# Patient Record
Sex: FEMALE | Race: WHITE | NOT HISPANIC OR LATINO | Employment: OTHER | URBAN - NONMETROPOLITAN AREA
[De-identification: names, ages, dates, MRNs, and addresses within clinical notes are randomized per-mention and may not be internally consistent; named-entity substitution may affect disease eponyms.]

---

## 2017-01-10 ENCOUNTER — OFFICE VISIT (OUTPATIENT)
Dept: FAMILY MEDICINE CLINIC | Facility: CLINIC | Age: 67
End: 2017-01-10

## 2017-01-10 VITALS
HEIGHT: 63 IN | BODY MASS INDEX: 20.86 KG/M2 | HEART RATE: 93 BPM | DIASTOLIC BLOOD PRESSURE: 75 MMHG | OXYGEN SATURATION: 100 % | WEIGHT: 117.7 LBS | SYSTOLIC BLOOD PRESSURE: 109 MMHG

## 2017-01-10 DIAGNOSIS — E78.2 MIXED HYPERLIPIDEMIA: ICD-10-CM

## 2017-01-10 DIAGNOSIS — N39.0 UTI (URINARY TRACT INFECTION), UNCOMPLICATED: Primary | ICD-10-CM

## 2017-01-10 DIAGNOSIS — E55.9 VITAMIN D DEFICIENCY: ICD-10-CM

## 2017-01-10 DIAGNOSIS — R41.3 MEMORY LOSS: ICD-10-CM

## 2017-01-10 DIAGNOSIS — N39.0 URINARY TRACT INFECTION, SITE UNSPECIFIED: Primary | ICD-10-CM

## 2017-01-10 PROCEDURE — 99214 OFFICE O/P EST MOD 30 MIN: CPT | Performed by: GENERAL PRACTICE

## 2017-01-10 RX ORDER — MELOXICAM 15 MG/1
15 TABLET ORAL DAILY
COMMUNITY
End: 2017-02-20

## 2017-01-10 RX ORDER — SULFAMETHOXAZOLE AND TRIMETHOPRIM 800; 160 MG/1; MG/1
1 TABLET ORAL 2 TIMES DAILY
Qty: 14 TABLET | Refills: 0 | Status: SHIPPED | OUTPATIENT
Start: 2017-01-10 | End: 2017-02-20

## 2017-01-10 NOTE — MR AVS SNAPSHOT
"                        Kalli Lowe   1/10/2017 11:15 AM   Office Visit    Dept Phone:  [phone]   Encounter #:  [id number]    Provider:  Tiffanie Hidalgo MD   Department:  [facility]                Your Full Care Plan              Your Updated Medication List          This list is accurate as of: 1/10/17 12:28 PM.  Always use your most recent med list.                alendronate 35 MG tablet   Commonly known as:  FOSAMAX       Calcium Carbonate 1500 (600 CA) MG tablet       meloxicam 15 MG tablet   Commonly known as:  MOBIC       VITAMIN D2 PO               You Were Diagnosed With        Codes Comments    Memory loss    -  Primary ICD-10-CM: R41.3  ICD-9-CM: 780.93       Instructions    Recheck urine culture on Jan 19th or 20th      Patient Instructions History      Upcoming Appointments     Visit Type Date Time Department    OFFICE VISIT 1/10/2017 11:15 AM [facility]    PHYSICAL 5/11/2017 10:00 AM [facility]      MyChart Signup     Our records indicate that you have declined [facility] [employer] signup. If you would like to sign up for [employer], please email [email or web address] or call [phone] to obtain an activation code.             Other Info from Your Visit           Your Appointments     May 11, 2017 10:00 AM CDT   Physical with Tiffanie Hidalgo MD   [facility] (--)    [address]  [facility] [place] [address] [place] [id number] [phone]           Arrive 15 minutes prior to appointment.              Allergies     No Known Allergies      Reason for Visit     Follow-up     Urinary Tract Infection     Alzheimer's Disease wants to be tested      Vital Signs     Blood Pressure Pulse Height Weight Oxygen Saturation Body Mass Index    109/75 (BP Location: Left arm, Patient Position: Sitting, Cuff Size: Adult) 93 63\" (160 cm) 117 lb 11.2 oz (53.4 kg) 100% 20.85 kg/m2    Smoking Status      "             Never Smoker           Problems and Diagnoses Noted     Memory loss    -  Primary

## 2017-01-10 NOTE — PROGRESS NOTES
Subjective   Kalli Lowe is a 66 y.o. female.     Chief Complaint   Patient presents with   • Follow-up   • Urinary Tract Infection   • Alzheimer's Disease     wants to be tested     Urinary Tract Infection    This is a new problem. The current episode started today. The problem occurs every urination. The problem has been gradually worsening. The quality of the pain is described as burning. The pain is at a severity of 4/10. The pain is moderate. There has been no fever. Associated symptoms include frequency, hesitancy and urgency. Pertinent negatives include no chills, discharge, flank pain, hematuria, nausea, possible pregnancy, sweats or vomiting. She has tried nothing for the symptoms.   Also family thinks she might be having some memory problems, she is not sure, has always had to write things down to remember them. Has been under a lot of stress as has had a lot of life changes. No family history, mother is in her 90's and very sharp. Father  at 96.   Is scheduled for a hip replacement on .    The following portions of the patient's history were reviewed and updated as appropriate: allergies, current medications, past social history and problem list.    Current Outpatient Prescriptions:   •  meloxicam (MOBIC) 15 MG tablet, Take 15 mg by mouth Daily., Disp: , Rfl:   •  alendronate (FOSAMAX) 35 MG tablet, Take 35 mg by mouth. 1 tab once a week first thing in am with full glass of water, do not eat or lay down for 30 mins, Disp: , Rfl:   •  Calcium Carbonate 1500 (600 CA) MG tablet, Take 600 mg by mouth 2 (Two) Times a Day., Disp: , Rfl:   •  Ergocalciferol (VITAMIN D2 PO), Take 1,000 Units by mouth Daily., Disp: , Rfl:   •  sulfamethoxazole-trimethoprim (BACTRIM DS,SEPTRA DS) 800-160 MG per tablet, Take 1 tablet by mouth 2 (Two) Times a Day., Disp: 14 tablet, Rfl: 0    Review of Systems   Constitutional: Negative.  Negative for activity change, appetite change, chills, fatigue, fever and  "unexpected weight change.   HENT: Negative.  Negative for congestion, ear pain, hearing loss, nosebleeds, rhinorrhea, sinus pressure, sneezing, sore throat, tinnitus and trouble swallowing.    Eyes: Negative.  Negative for pain, discharge, redness, itching and visual disturbance.   Respiratory: Negative.  Negative for apnea, cough, chest tightness, shortness of breath and wheezing.    Cardiovascular: Negative.  Negative for chest pain, palpitations and leg swelling.   Gastrointestinal: Negative.  Negative for abdominal distention, abdominal pain, constipation, diarrhea, nausea and vomiting.   Endocrine: Negative.    Genitourinary: Positive for frequency, hesitancy and urgency. Negative for dysuria, flank pain and hematuria.   Musculoskeletal: Negative.  Negative for arthralgias, back pain, gait problem, joint swelling, myalgias, neck pain and neck stiffness.   Skin: Negative.  Negative for color change and rash.   Allergic/Immunologic: Negative.    Neurological: Negative.  Negative for dizziness, weakness, light-headedness, numbness and headaches.   Hematological: Negative.  Negative for adenopathy.   Psychiatric/Behavioral: Negative.  Negative for dysphoric mood and sleep disturbance. The patient is not nervous/anxious.      Objective     Visit Vitals   • /75 (BP Location: Left arm, Patient Position: Sitting, Cuff Size: Adult)   • Pulse 93   • Ht 63\" (160 cm)   • Wt 117 lb 11.2 oz (53.4 kg)   • SpO2 100%   • BMI 20.85 kg/m2     Physical Exam   Constitutional: She is oriented to person, place, and time. She appears well-developed and well-nourished. No distress.   HENT:   Head: Normocephalic and atraumatic.   Nose: Nose normal.   Mouth/Throat: Oropharynx is clear and moist.   Eyes: Conjunctivae and EOM are normal. Pupils are equal, round, and reactive to light. Right eye exhibits no discharge. Left eye exhibits no discharge.   Neck: No thyromegaly present.   Cardiovascular: Normal rate, regular rhythm, normal " heart sounds and intact distal pulses.    Pulmonary/Chest: Effort normal and breath sounds normal.   Abdominal: There is tenderness (mild suprapubic).   Lymphadenopathy:     She has no cervical adenopathy.   Neurological: She is alert and oriented to person, place, and time.   MMSE 30/30   Skin: Skin is warm and dry.   Psychiatric: She has a normal mood and affect.   Nursing note and vitals reviewed.    Assessment/Plan     Problem List Items Addressed This Visit     None      Visit Diagnoses     UTI (urinary tract infection), uncomplicated    -  Primary    Relevant Orders    Urinalysis With / Culture If Indicated    Urine Culture    Urinalysis With / Microscopic If Indicated    Memory loss        Relevant Orders    CBC Auto Differential    TSH+Free T4    Vitamin B12    CMP14+eGFR    Magnesium    CT Head Without Contrast    Mixed hyperlipidemia        Relevant Orders    Lipid Panel    LDL Cholesterol, Direct    Comprehensive Metabolic Panel    Vitamin D deficiency        Relevant Orders    Vitamin D 25 Hydroxy        Will notify regarding results. Repeat urine culture after antibiotic and before surgery.    New Medications Ordered This Visit   Medications   • meloxicam (MOBIC) 15 MG tablet     Sig: Take 15 mg by mouth Daily.   • sulfamethoxazole-trimethoprim (BACTRIM DS,SEPTRA DS) 800-160 MG per tablet     Sig: Take 1 tablet by mouth 2 (Two) Times a Day.     Dispense:  14 tablet     Refill:  0

## 2017-01-11 ENCOUNTER — TELEPHONE (OUTPATIENT)
Dept: FAMILY MEDICINE CLINIC | Facility: CLINIC | Age: 67
End: 2017-01-11

## 2017-01-11 NOTE — TELEPHONE ENCOUNTER
All labs within Normal Limits, Letter Sent.     ----- Message from Tiffanie Hidalgo MD sent at 1/10/2017  4:48 PM CST -----  Card normal

## 2017-01-15 ENCOUNTER — RESULTS ENCOUNTER (OUTPATIENT)
Dept: FAMILY MEDICINE CLINIC | Facility: CLINIC | Age: 67
End: 2017-01-15

## 2017-01-15 DIAGNOSIS — R41.3 MEMORY LOSS: ICD-10-CM

## 2017-01-20 ENCOUNTER — TRANSCRIBE ORDERS (OUTPATIENT)
Dept: PHYSICAL THERAPY | Facility: HOSPITAL | Age: 67
End: 2017-01-20

## 2017-01-20 DIAGNOSIS — M25.551 RIGHT HIP PAIN: Primary | ICD-10-CM

## 2017-01-27 ENCOUNTER — APPOINTMENT (OUTPATIENT)
Dept: PHYSICAL THERAPY | Facility: HOSPITAL | Age: 67
End: 2017-01-27

## 2017-01-30 ENCOUNTER — HOSPITAL ENCOUNTER (OUTPATIENT)
Dept: PHYSICAL THERAPY | Facility: HOSPITAL | Age: 67
Setting detail: THERAPIES SERIES
Discharge: HOME | End: 2017-01-30

## 2017-01-30 DIAGNOSIS — Z96.641 STATUS POST TOTAL REPLACEMENT OF RIGHT HIP: ICD-10-CM

## 2017-01-30 DIAGNOSIS — M25.551 RIGHT HIP PAIN: Primary | ICD-10-CM

## 2017-01-30 PROCEDURE — 97161 PT EVAL LOW COMPLEX 20 MIN: CPT | Performed by: PHYSICAL THERAPIST

## 2017-01-30 PROCEDURE — 97110 THERAPEUTIC EXERCISES: CPT | Performed by: PHYSICAL THERAPIST

## 2017-01-30 PROCEDURE — G8978 MOBILITY CURRENT STATUS: HCPCS | Performed by: PHYSICAL THERAPIST

## 2017-01-30 PROCEDURE — G8979 MOBILITY GOAL STATUS: HCPCS | Performed by: PHYSICAL THERAPIST

## 2017-01-30 NOTE — PROGRESS NOTES
Outpatient Physical Therapy Ortho Initial Evaluation  [facility]     Patient Name: Kalli Lowe  : 1950  MRN: [id number]  Today's Date: 2017      Visit Date: 2017   Attendance:   Subjective Improvement: 0%  Recert Date: 2017  MD Appointment: [facility]    There is no problem list on file for this patient.       Past Medical History   Diagnosis Date   • Benign paroxysmal positional vertigo    • Breast lump      left breast benign on u/s guided mammotone bx      • Contact dermatitis      olecranon area from chair material?     • Cough    • Encounter for long-term (current) use of medications    • GERD (gastroesophageal reflux disease)    • H/O screening mammography    • Hip pain    • History of Holter monitoring 2013     Overall rhythm sinus rhythm with average heart rate of 73 bpm, min 49 , max 111. Frequent isolated with frequent bigeminal cycles, total of 491 bigeminal cycles. No couplets of runs of ventricular tachycardia. Rare isolated PACs, total of 25.   • History of urinary disorder    • Large ovary    • Myalgia    • Osteoarthritis    • Osteopenia    • Otalgia of left ear      resolved spontaneously      • Palpitations    • Postmenopausal state    • Screening for malignant neoplasm of breast       left breast benign mammotone bx      • Screening for osteoporosis    • Vertigo      resolved        Past Surgical History   Procedure Laterality Date   • Breast biopsy  2014     Ultrasound guided left breast mammotome biopsy with 8 gauge needle and also left a clip.   • Colonoscopy  2013     Hemorrhoids found.   • Pap smear  2015     WNL, CARD SENT, DR. CARLSON'S OFFICE   • Injection of medication  2015     PNEUMOC VAC/ADMIN/RCVD 4040F (1)      • Hip surgery Right 2017     Total hip-lateral approach       Visit Dx:     ICD-10-CM ICD-9-CM   1. Right hip pain M25.551 719.45   2. Status post total replacement of right hip Z96.641 V43.64              Patient History       01/30/17 1100          History    Chief Complaint Muscle weakness;Difficulty Walking;Difficulty with daily activities;Balance Problems;Pain   Post operatively   -BB      Type of Pain Hip pain  -BB      Date Current Problem(s) Began 01/24/17   Day of surgery  -BB      Brief Description of Current Complaint Had hx of hip pain chronically with attempts of PT to assist. No sigificant improvement then had surgery.   -BB      Previous treatment for THIS PROBLEM Rehabilitation;Other (comment)   history of failed conservation PT tx prior to surgery 1 year  -BB      Onset Date- PT 1/30/2017   Surgery 1/24/2017  -BB      Patient/Caregiver Goals Return to prior level of function  -BB      What clinical tests have you had for this problem? --   No imaging since surgery  -BB      Related/Recent Hospitalizations Yes  -BB      Date of Hospitalization 01/24/17   DC 1/27/2017  -BB      Pain     Pain Location Hip  -BB      Pain at Present 3  -BB      Pain Frequency Intermittent  -BB      Pain Comments Normal post surgical pain  -BB      Is your sleep disturbed? Yes  -BB      Difficulties with ADL's? Having assistance from   -BB      Fall Risk Assessment    Any falls in the past year: Other (comment)   Patient is fall risk due to post surgical limitations  -BB        User Key  (r) = Recorded By, (t) = Taken By, (c) = Cosigned By    Initials Name Provider Type    ANA Alcantara PT Physical Therapist                PT Ortho       01/30/17 1700    Precautions and Contraindications    Precautions/Limitations hip precautions- right   lateral approach  -BB    Precautions Total Hip Protocol under media tab from MD office.   -BB    Pain Assessment    Pain Assessment 0-10  -BB    Pain Location Hip  -BB    Pain Frequency Constant/continuous  -BB    Posture/Observations    Observations Edema   RLE in knee, ankle, foot  -BB    Posture/Observations Comments Antalgic step to pattern with rollator seen. Assistance  needed with supine to sit and sit to supine transfer  -BB    Sensation    Sensation WNL? WNL  -BB    Sensory Screen for Light Touch- Lower Quarter Clearing    L2 (anterior mid thigh) Intact  -BB    L3 (distal anterior thigh) Intact  -BB    L4 (medial lower leg/foot) Intact  -BB    L5 (lateral lower leg/great toe) Intact  -BB    Myotomal Screen- Lower Quarter Clearing    Hip flexion (L2) 2 (Poor)   due to post surgical weakness  -BB    Special Tests/Palpation    Special Tests/Palpation --   No sigificant muscular tenderness noted. Tender at incision  -BB    ROM (Range of Motion)    General ROM lower extremity range of motion deficits identified  -BB    General LE Assessment    ROM hip, right: LE ROM deficit;knee, right: LE ROM deficit;ankle, right: LE ROM deficit  -BB    Right Hip    Flexion AROM other (see comments)   0-90 seen in sitting  -BB    ABduction AROM other (see comments)   to neutral   -BB    ADduction AROM other (see comments)   to neutral  -BB    Internal Rotation AROM other (see comments)   Deferred  -BB    External Rotation AROM other (see comments)   Deferred  -BB    Right Knee    Extension/Flexion AROM WNL (0-130 degrees)  -BB    MMT (Manual Muscle Testing)    General MMT Assessment lower extremity strength deficits identified  -BB    Right Hip    Hip Flexion Gross Movement (2/5) poor  -BB    Hip ABduction Gross Movement other (see comments)   Deferred  -BB    Hip ADduction Gross Movement other (see comments)   Deferred  -BB    Hip Int (Medial) Rotation Gross Movement other (see comments)   Deferred  -BB    Hip Ext (Lat) Rotation Gross Movement other (see comments)   Deferred  -BB    Lower Extremity    Lower Ext Manual Muscle Testing right hip strength deficit  -BB    Girth    Girth Measured? Right Lower Extremity  -BB    RLE Quick Girth (cm)    Largest calf 12.5 cm   inches  -BB    Distal thigh 14 cm   inches  -BB    Other 1 20 cm   20 inches figure 8  -BB    Transfers    Transfers, Sit-Stand  [place] supervision required  -BB    Transfer, Impairments other (see comments)   Cues to maintain hip precautions. Required assistance  -BB    Gait Assessment/Treatment    Gait, [place] Level supervision required  -BB    Gait, Assistive Device rollator  -BB    Gait, Comment Step to pattern with slow gait.   -BB      User Key  (r) = Recorded By, (t) = Taken By, (c) = Cosigned By    Initials Name Provider Type    BB Alma Alcantara PT Physical Therapist                            Therapy Education       01/30/17 1700    Therapy Education    Given HEP  -BB    Program New  -BB    How Provided Verbal  -BB    Provided to Patient  -BB    Level of Understanding Verbalized;Demonstrated  -BB      User Key  (r) = Recorded By, (t) = Taken By, (c) = Cosigned By    Initials Name Provider Type    ANA Alcantara PT Physical Therapist                PT OP Goals       01/30/17 1745 01/30/17 1700       PT Short Term Goals    STG Date to Achieve  02/20/17  -BB     STG 1  Independent in HEP   -BB     STG 1 Progress  New  -BB     STG 2  SLR x10 no lag  -BB     STG 2 Progress  New  -BB     STG 3  Sit to stand no UE assist  -BB     STG 3 Progress  New  -BB     Long Term Goals    LTG Date to Achieve  03/13/17  -BB     LTG 1  Independent in final HEP   -BB     LTG 1 Progress  New  -BB     LTG 2  SLS RLE 5 seconds level ground with eyes open  -BB     LTG 2 Progress  New  -BB     LTG 3  Hip flexion 5/5  -BB     LTG 3 Progress  New  -BB     LTG 4  Hip abduction 5/5  -BB     LTG 4 Progress  New  -BB     LTG 5  No increase in pain with 6 minute walk test  -BB     LTG 5 Progress  New  -BB     Time Calculation    PT Goal Re-Cert Due Date 02/20/17  -BB 02/20/17  -BB       User Key  (r) = Recorded By, (t) = Taken By, (c) = Cosigned By    Initials Name Provider Type    ANA Alcantara PT Physical Therapist                PT Assessment/Plan       01/30/17 1700          PT Assessment    Functional Limitations Decreased safety during  functional activities;Impaired gait;Impaired locomotion;Performance in leisure activities;Performance in self-care ADL  -BB      Impairments Balance;Endurance;Gait;Impaired flexibility;Impaired muscle endurance;Joint mobility;Locomotion;Motor function;Muscle strength;Pain;Range of motion;Other (comment)   Swelling  -BB      Assessment Comments Patient presents postoperatively with decreased gait, balance, pain, muscular endurance and would benefit from skilled PT services addressing deficits. Patient educated on transfer training while maintaining hip precautions for lateral approach    Tubigrip dispensed for edema in RLE.   -BB      Rehab Potential Good  -BB      Patient/caregiver participated in establishment of treatment plan and goals Yes  -BB      Patient would benefit from skilled therapy intervention Yes  -BB      PT Plan    PT Frequency 3x/week  -BB      Predicted Duration of Therapy Intervention (days/wks) 6-8 weeks    per MD order  -BB      Planned CPT's? PT EVAL: 02071;PT RE-EVAL: 12017;PT THER PROC EA 15 MIN: 08571;PT MANUAL THERAPY EA 15 MIN: 01646;PT NEUROMUSC RE-EDUCATION EA 15 MIN: 95493;PT GAIT TRAINING EA 15 MIN: 88998;PT ELECTRICAL STIM UNATTEND: ;PT ELECTRICAL STIM ATTD EA 15 MIN: 65010;PT ULTRASOUND EA 15 MIN: 42311;PT THER SUPP EA 15 MIN  -BB      Physical Therapy Interventions (Optional Details) balance training;bed mobility training;gait training;home exercise program;joint mobilization;manual therapy techniques;modalities;neuromuscular re-education;ROM (Range of Motion);stair training;strengthening;stretching;transfer training  -BB      PT Plan Comments Progress to SC as able safely once quad control seen.   -BB        User Key  (r) = Recorded By, (t) = Taken By, (c) = Cosigned By    Initials Name Provider Type    ANA Alcantara, PT Physical Therapist                Modalities       01/30/17 1700          Subjective Comments    Subjective Comments Reports going up/down stairs in home  "1x with assistance and difficulty. Surgery performed at [facility]. Has a lateral approach with no sutures-steristrips only. Denies any drainage/opening/redness. Lateral approach. Noticed increased swelling in knee and foot this weekend and has kept leg and foot elevated. Pt complains of wanting \"normal sex\" and is having constant pain at this point.   -BB      Subjective Pain    Able to rate subjective pain? yes  -BB      Pre-Treatment Pain Level 3  -BB      Post-Treatment Pain Level --   \"feels stretched\"  -BB      Ice    Patient denies application of Ice Yes  -BB        User Key  (r) = Recorded By, (t) = Taken By, (c) = Cosigned By    Initials Name Provider Type    ANA Alcantara, PT Physical Therapist              Exercises       01/30/17 1700          Subjective Comments    Subjective Comments Reports going up/down stairs in home 1x with assistance and difficulty. Surgery performed at [facility]. Has a lateral approach with no sutures-steristrips only. Denies any drainage/opening/redness. Lateral approach. Noticed increased swelling in knee and foot this weekend and has kept leg and foot elevated. Pt complains of wanting \"normal sex\" and is having constant pain at this point.   -BB      Subjective Pain    Able to rate subjective pain? yes  -BB      Pre-Treatment Pain Level 3  -BB      Post-Treatment Pain Level --   \"feels stretched\"  -BB      Exercise 1    Exercise Name 1 AAROM SLR   -BB      Cueing 1 Tactile  -BB      Sets 1 1  -BB      Reps 1 10  -BB      Exercise 2    Exercise Name 2 Quad sets  -BB      Cueing 2 Verbal  -BB      Sets 2 1  -BB      Reps 2 10  -BB      Time (Seconds) 2 3 second hold  -BB      Exercise 3    Exercise Name 3 Heel slide  -BB      Cueing 3 Verbal  -BB      Sets 3 1  -BB      Reps 3 10  -BB      Exercise 4    Exercise Name 4 SAQ with large bolster  -BB      Cueing 4 Verbal  -BB      Sets 4 2  -BB      Reps 4 10  -BB      Exercise 5    Exercise Name 5 LAQ   " -BB      Sets 5 2  -BB      Reps 5 10  -BB        User Key  (r) = Recorded By, (t) = Taken By, (c) = Cosigned By    Initials Name Provider Type    BB Alma Alcantara PT Physical Therapist                              Outcome Measures       01/30/17 1700          Lower Extremity Functional Index    Any of your usual work, housework or school activities 0  -BB      Your usual hobbies, recreational or sporting activities 0  -BB      Getting into or out of the bath 0  -BB      Walking between rooms 0  -BB      Putting on your shoes or socks 0  -BB      Squatting 0  -BB      Lifting an object, like a bag of groceries from the floor 0  -BB      Performing light activities around your home 0  -BB      Performing heavy activities around your home 0  -BB      Getting into or out of a car 0  -BB      Walking 2 blocks 0  -BB      Walking a mile 0  -BB      Going up or down 10 stairs (about 1 flight of stairs) 0  -BB      Standing for 1 hour 0  -BB      Sitting for 1 hour 1  -BB      Running on even ground 0  -BB      Running on uneven ground 0  -BB      Making sharp turns while running fast 0  -BB      Hopping 0  -BB      Rolling over in bed 1  -BB      Total 2  -BB      Functional Assessment    Outcome Measure Options Lower Extremity Functional Scale (LEFS)  -BB        User Key  (r) = Recorded By, (t) = Taken By, (c) = Cosigned By    Initials Name Provider Type    BB Alma Alcantara PT Physical Therapist            Time Calculation:   Start Time: 1040  Stop Time: 1136  Time Calculation (min): 56 min  Total Timed Code Minutes- PT: 36 minute(s)     Therapy Charges for Today     Code Description Service Date Service Provider Modifiers Qty    21129465150 HC PT MOBILITY CURRENT 1/30/2017 Alma Alcantara PT GP, CL 1    89008909847 HC PT MOBILITY PROJECTED 1/30/2017 Alma Alcantara PT GP, CI 1    78577569916 HC PT EVAL LOW COMPLEXITY 1 1/30/2017 Alma Alcantara PT GP 1    10782348029 HC PT THER PROC EA 15 MIN 1/30/2017 Alma Alcantara PT  GP 2          PT G-Codes  PT Professional Judgement Used?: Yes  Outcome Measure Options: Lower Extremity Functional Scale (LEFS)  Score: 2/80  Functional Limitation: Mobility: Walking and moving around  Mobility: Walking and Moving Around Current Status (): At least 60 percent but less than 80 percent impaired, limited or restricted  Mobility: Walking and Moving Around Goal Status (): At least 1 percent but less than 20 percent impaired, limited or restricted         Alma Alcantara, PT  1/30/2017

## 2017-01-30 NOTE — MR AVS SNAPSHOT
[facility]   [phone]                    Kalli Lowe   1/30/2017 11:00 AM   Initial Evaluation    Dept Phone:  [phone]   Encounter #:  [id number]    Provider:  Alma Alcantara PT   Department:  [facility]                 Your Full Care Plan              Your Updated Medication List      ASK your doctor about these medications     alendronate 35 MG tablet   Commonly known as:  FOSAMAX       Calcium Carbonate 1500 (600 CA) MG tablet       meloxicam 15 MG tablet   Commonly known as:  MOBIC       sulfamethoxazole-trimethoprim 800-160 MG per tablet   Commonly known as:  BACTRIM DS,SEPTRA DS   Take 1 tablet by mouth 2 (Two) Times a Day.       VITAMIN D2 PO               You Were Diagnosed With        Codes Comments    Right hip pain    -  Primary ICD-10-CM: M25.551  ICD-9-CM: 719.45     Status post total replacement of right hip     ICD-10-CM: Z96.641  ICD-9-CM: V43.64       Instructions     None    Patient Instructions History      Upcoming Appointments     Visit Type Date Time Department    INITIAL EVALUATION 1/30/2017 11:00 AM  [facility]     TREATMENT 2/6/2017 11:00 AM  [facility]     TREATMENT 2/8/2017 11:15 AM  [facility]     TREATMENT 2/10/2017 10:00 AM  [facility]     CT MAD HEAD WO CONTRAST 2/13/2017  3:00 PM  MAD CT    PHYSICAL 5/11/2017 10:00 AM [facility]    MAMMO MAD SCREEN BILAT 5/11/2017 11:00 AM [facility]      MyChart Signup     Our records indicate that you have declined [facility] MyChart signup. If you would like to sign up for MyChart, please email [email or web address] or call [phone] to obtain an activation code.             Other Info from Your Visit           Your Appointments     Feb 06, 2017 11:00 AM CST   Therapy Treatment with Sheree Ball PTA   [facility]  (--)    [address] [place] [id number] [phone]            Feb 08,  2017 11:15 AM CST   Therapy Treatment with Sheree Ball PTA   [facility]  (--)    [address] [address] [phone]            Feb 10, 2017 10:00 AM CST   Therapy Treatment with Sheree Ball PTA   [facility]  (--)    [address] [address] [phone]            Feb 13, 2017  3:00 PM CST   CT mad head wo contrast with MAD CT 1   [facility] CT ([place])    [address] [address] [phone]           Patient may only have liquids 4 hrs prior to exam. Please arrive 15 minutes prior to exam.            May 11, 2017 10:00 AM CDT   Physical with Tiffanie Hidalgo MD   [facility] (--)    [address]  [address] [place] [address] [phone]           Arrive 15 minutes prior to appointment.              Allergies     No Known Allergies      Reason for Visit     Right Hip - Pain, Weakness - Generalized, Gait Problem     PT Initial Evaluation           Vital Signs     Smoking Status                   Never Smoker           Problems and Diagnoses Noted     Right hip pain    -  Primary    Status post total replacement of right hip

## 2017-02-01 ENCOUNTER — HOSPITAL ENCOUNTER (OUTPATIENT)
Dept: PHYSICAL THERAPY | Facility: HOSPITAL | Age: 67
Setting detail: THERAPIES SERIES
Discharge: HOME | End: 2017-02-01

## 2017-02-01 DIAGNOSIS — Z96.641 STATUS POST TOTAL REPLACEMENT OF RIGHT HIP: Primary | ICD-10-CM

## 2017-02-01 PROCEDURE — 97116 GAIT TRAINING THERAPY: CPT

## 2017-02-01 PROCEDURE — 97110 THERAPEUTIC EXERCISES: CPT

## 2017-02-01 NOTE — PROGRESS NOTES
Outpatient Physical Therapy Ortho Treatment Note  [facility]     Patient Name: Kalli Lowe  : 1950  MRN: [id number]  Today's Date: 2017      Visit Date: 2017  Pt. Attended 2/2 sessions reports no subjective improvement returns to MD 17 recert date 17  Visit Dx:    ICD-10-CM ICD-9-CM   1. Status post total replacement of right hip Z96.641 V43.64       There is no problem list on file for this patient.       Past Medical History   Diagnosis Date   • Benign paroxysmal positional vertigo    • Breast lump      left breast benign on u/s guided mammotone bx      • Contact dermatitis      olecranon area from chair material?     • Cough    • Encounter for long-term (current) use of medications    • GERD (gastroesophageal reflux disease)    • H/O screening mammography    • Hip pain    • History of Holter monitoring 2013     Overall rhythm sinus rhythm with average heart rate of 73 bpm, min 49 , max 111. Frequent isolated with frequent bigeminal cycles, total of 491 bigeminal cycles. No couplets of runs of ventricular tachycardia. Rare isolated PACs, total of 25.   • History of urinary disorder    • Large ovary    • Myalgia    • Osteoarthritis    • Osteopenia    • Otalgia of left ear      resolved spontaneously      • Palpitations    • Postmenopausal state    • Screening for malignant neoplasm of breast       left breast benign mammotone bx      • Screening for osteoporosis    • Vertigo      resolved        Past Surgical History   Procedure Laterality Date   • Breast biopsy  2014     Ultrasound guided left breast mammotome biopsy with 8 gauge needle and also left a clip.   • Colonoscopy  2013     Hemorrhoids found.   • Pap smear  2015     WNL, CARD SENT, DR. CARLSON'S OFFICE   • Injection of medication  2015     PNEUMOC VAC/ADMIN/RCVD 4040F (1)      • Hip surgery Right 2017     Total hip-lateral approach             PT Ortho       17  1700    Precautions and Contraindications    Precautions/Limitations hip precautions- right   lateral approach  -BB    Precautions Total Hip Protocol under media tab from MD office.   -BB    Pain Assessment    Pain Assessment 0-10  -BB    Pain Location Hip  -BB    Pain Frequency Constant/continuous  -BB    Posture/Observations    Observations Edema   RLE in knee, ankle, foot  -BB    Posture/Observations Comments Antalgic step to pattern with rollator seen. Assistance needed with supine to sit and sit to supine transfer  -BB    Sensation    Sensation WNL? WNL  -BB    Sensory Screen for Light Touch- Lower Quarter Clearing    L2 (anterior mid thigh) Intact  -BB    L3 (distal anterior thigh) Intact  -BB    L4 (medial lower leg/foot) Intact  -BB    L5 (lateral lower leg/great toe) Intact  -BB    Myotomal Screen- Lower Quarter Clearing    Hip flexion (L2) 2 (Poor)   due to post surgical weakness  -BB    Special Tests/Palpation    Special Tests/Palpation --   No sigificant muscular tenderness noted. Tender at incision  -BB    ROM (Range of Motion)    General ROM lower extremity range of motion deficits identified  -BB    General LE Assessment    ROM hip, right: LE ROM deficit;knee, right: LE ROM deficit;ankle, right: LE ROM deficit  -BB    Right Hip    Flexion AROM other (see comments)   0-90 seen in sitting  -BB    ABduction AROM other (see comments)   to neutral   -BB    ADduction AROM other (see comments)   to neutral  -BB    Internal Rotation AROM other (see comments)   Deferred  -BB    External Rotation AROM other (see comments)   Deferred  -BB    Right Knee    Extension/Flexion AROM WNL (0-130 degrees)  -BB    MMT (Manual Muscle Testing)    General MMT Assessment lower extremity strength deficits identified  -BB    Right Hip    Hip Flexion Gross Movement (2/5) poor  -BB    Hip ABduction Gross Movement other (see comments)   Deferred  -BB    Hip ADduction Gross Movement other (see comments)   Deferred  -BB    Hip Int  (Medial) Rotation Gross Movement other (see comments)   Deferred  -BB    Hip Ext (Lat) Rotation Gross Movement other (see comments)   Deferred  -BB    Lower Extremity    Lower Ext Manual Muscle Testing right hip strength deficit  -BB    Girth    Girth Measured? Right Lower Extremity  -BB    RLE Quick Girth (cm)    Largest calf 12.5 cm   inches  -BB    Distal thigh 14 cm   inches  -BB    Other 1 20 cm   20 inches figure 8  -BB    Transfers    Transfers, Sit-Stand [place] supervision required  -BB    Transfer, Impairments other (see comments)   Cues to maintain hip precautions. Required assistance  -BB    Gait Assessment/Treatment    Gait, [place] Level supervision required  -BB    Gait, Assistive Device rollator  -BB    Gait, Comment Step to pattern with slow gait.   -BB      User Key  (r) = Recorded By, (t) = Taken By, (c) = Cosigned By    Initials Name Provider Type    ANA Alcantara, PT Physical Therapist                            PT Assessment/Plan       02/01/17 1645 01/30/17 1700       PT Assessment    Functional Limitations  Decreased safety during functional activities;Impaired gait;Impaired locomotion;Performance in leisure activities;Performance in self-care ADL  -BB     Impairments  Balance;Endurance;Gait;Impaired flexibility;Impaired muscle endurance;Joint mobility;Locomotion;Motor function;Muscle strength;Pain;Range of motion;Other (comment)   Swelling  -BB     Assessment Comments Remains very cautious and guarded with ambulation with r. walker.  Good ROM hip flexion to 80 degrees with no pain good understanding of precautions   -SP Patient presents postoperatively with decreased gait, balance, pain, muscular endurance and would benefit from skilled PT services addressing deficits. Patient educated on transfer training while maintaining hip precautions for lateral approach    Tubigrip dispensed for edema in RLE.   -BB     Rehab Potential  Good  -BB     Patient/caregiver participated in  establishment of treatment plan and goals  Yes  -BB     Patient would benefit from skilled therapy intervention  Yes  -BB     PT Plan    PT Frequency 3x/week  -SP 3x/week  -BB     Predicted Duration of Therapy Intervention (days/wks) 6-8 weeks  -SP 6-8 weeks    per MD order  -BB     Planned CPT's?  PT EVAL: 21252;PT RE-EVAL: 09419;PT THER PROC EA 15 MIN: 96992;PT MANUAL THERAPY EA 15 MIN: 10698;PT NEUROMUSC RE-EDUCATION EA 15 MIN: 41414;PT GAIT TRAINING EA 15 MIN: 40687;PT ELECTRICAL STIM UNATTEND: ;PT ELECTRICAL STIM ATTD EA 15 MIN: 61699;PT ULTRASOUND EA 15 MIN: 87116;PT THER SUPP EA 15 MIN  -BB     Physical Therapy Interventions (Optional Details)  balance training;bed mobility training;gait training;home exercise program;joint mobilization;manual therapy techniques;modalities;neuromuscular re-education;ROM (Range of Motion);stair training;strengthening;stretching;transfer training  -BB     PT Plan Comments Continue with week 0-2 hip protocol continue with THR precautions   add ham sets seated over tball or supine <90  step thrus and work on increasing ambulation  -SP Progress to SC as able safely once quad control seen.   -BB       User Key  (r) = Recorded By, (t) = Taken By, (c) = Cosigned By    Initials Name Provider Type    ANA Alcantara, PT Physical Therapist    SP Mary Kate Craig, PTA Physical Therapy Assistant                    Exercises       02/01/17 1500          Subjective Comments    Subjective Comments Still needs walker to ambulate and sleeps okay  -SP      Subjective Pain    Able to rate subjective pain? yes  -SP      Pre-Treatment Pain Level 2  -SP      Post-Treatment Pain Level 2  -SP      Subjective Pain Comment --   pt reports 2.5  -SP      Exercise 1    Exercise Name 1 amb with rollator 360 without sitting  -SP      Cueing 1 --  -SP      Sets 1 --  -SP      Reps 1 --  -SP      Exercise 2    Exercise Name 2 standing hip ext light stretch only toe touch  -SP      Cueing 2 --  -SP       Sets 2 3  -SP      Reps 2 30  -SP      Time (Seconds) 2 --  -SP      Exercise 3    Exercise Name 3 Heel slide  -SP      Cueing 3 Verbal  -SP      Sets 3 1  -SP      Reps 3 10  -SP      Exercise 4    Exercise Name 4 SAQ with large bolster  -SP      Cueing 4 Verbal  -SP      Sets 4 2  -SP      Reps 4 10  -SP      Exercise 5    Exercise Name 5 LAQ with add within hip precautions  -SP      Sets 5 2  -SP      Reps 5 10  -SP      Exercise 6    Exercise Name 6 seated march within hip precautions  -SP      Sets 6 2  -SP      Reps 6 20  -SP      Exercise 7    Exercise Name 7 standing march, hip abd, ham curls  -SP      Reps 7 10  -SP        User Key  (r) = Recorded By, (t) = Taken By, (c) = Cosigned By    Initials Name Provider Type    SP Mary Kate Craig, PTA Physical Therapy Assistant                               PT OP Goals       02/01/17 1649 02/01/17 1600 01/30/17 1745    PT Short Term Goals    STG Date to Achieve  02/20/17  -SP     STG 1  Independent in HEP   -SP     STG 1 Progress  Progressing  -SP     STG 2  SLR x10 no lag  -SP     STG 2 Progress  Not Met  -SP     STG 3  Sit to stand no UE assist  -SP     STG 3 Progress  Not Met  -SP     Long Term Goals    LTG Date to Achieve  03/13/17  -SP     LTG 1  Independent in final HEP   -SP     LTG 1 Progress  Not Met  -SP     LTG 2  SLS RLE 5 seconds level ground with eyes open  -SP     LTG 2 Progress  Not Met  -SP     LTG 3  Hip flexion 5/5  -SP     LTG 3 Progress  Not Met  -SP     LTG 4  Hip abduction 5/5  -SP     LTG 4 Progress  Not Met  -SP     LTG 5  No increase in pain with 6 minute walk test  -SP     LTG 5 Progress  Not Met  -SP     Time Calculation    PT Goal Re-Cert Due Date 02/20/17  -SP 02/20/17  -SP 02/20/17  -BB      01/30/17 1700          PT Short Term Goals    STG Date to Achieve 02/20/17  -BB      STG 1 Independent in HEP   -BB      STG 1 Progress New  -BB      STG 2 SLR x10 no lag  -BB      STG 2 Progress New  -BB      STG 3 Sit to stand no UE assist  -BB       STG 3 Progress New  -BB      Long Term Goals    LTG Date to Achieve 03/13/17  -BB      LTG 1 Independent in final HEP   -BB      LTG 1 Progress New  -BB      LTG 2 SLS RLE 5 seconds level ground with eyes open  -BB      LTG 2 Progress New  -BB      LTG 3 Hip flexion 5/5  -BB      LTG 3 Progress New  -BB      LTG 4 Hip abduction 5/5  -BB      LTG 4 Progress New  -BB      LTG 5 No increase in pain with 6 minute walk test  -BB      LTG 5 Progress New  -BB      Time Calculation    PT Goal Re-Cert Due Date 02/20/17  -BB        User Key  (r) = Recorded By, (t) = Taken By, (c) = Cosigned By    Initials Name Provider Type    ANA Alcantara, PT Physical Therapist    SP Mary Kate Craig, PTA Physical Therapy Assistant                Therapy Education       01/30/17 1700    Therapy Education    Given HEP  -BB    Program New  -BB    How Provided Verbal  -BB    Provided to Patient  -BB    Level of Understanding Verbalized;Demonstrated  -BB      User Key  (r) = Recorded By, (t) = Taken By, (c) = Cosigned By    Initials Name Provider Type    ANA Alcantara, PT Physical Therapist                Outcome Measures       01/30/17 1700          Lower Extremity Functional Index    Any of your usual work, housework or school activities 0  -BB      Your usual hobbies, recreational or sporting activities 0  -BB      Getting into or out of the bath 0  -BB      Walking between rooms 0  -BB      Putting on your shoes or socks 0  -BB      Squatting 0  -BB      Lifting an object, like a bag of groceries from the floor 0  -BB      Performing light activities around your home 0  -BB      Performing heavy activities around your home 0  -BB      Getting into or out of a car 0  -BB      Walking 2 blocks 0  -BB      Walking a mile 0  -BB      Going up or down 10 stairs (about 1 flight of stairs) 0  -BB      Standing for 1 hour 0  -BB      Sitting for 1 hour 1  -BB      Running on even ground 0  -BB      Running on uneven ground 0  -BB       Making sharp turns while running fast 0  -BB      Hopping 0  -BB      Rolling over in bed 1  -BB      Total 2  -BB      Functional Assessment    Outcome Measure Options Lower Extremity Functional Scale (LEFS)  -BB        User Key  (r) = Recorded By, (t) = Taken By, (c) = Cosigned By    Initials Name Provider Type    BB Alma Alcantara, PT Physical Therapist            Time Calculation:   Start Time: 1532  Stop Time: 1626  Time Calculation (min): 54 min  Total Timed Code Minutes- PT: 54 minute(s)    Therapy Charges for Today     Code Description Service Date Service Provider Modifiers Qty    [id number] HC GAIT TRAINING EA 15 MIN 2/1/2017 Mary Kate Craig, PTA GP 1    [id number]  PT THER PROC EA 15 MIN 2/1/2017 Mary Kate Craig PTA GP 3                    Mary Kate Craig PTA  2/1/2017

## 2017-02-03 ENCOUNTER — HOSPITAL ENCOUNTER (OUTPATIENT)
Dept: PHYSICAL THERAPY | Facility: HOSPITAL | Age: 67
Setting detail: THERAPIES SERIES
Discharge: HOME | End: 2017-02-03

## 2017-02-03 DIAGNOSIS — M25.551 RIGHT HIP PAIN: ICD-10-CM

## 2017-02-03 DIAGNOSIS — Z96.641 STATUS POST TOTAL REPLACEMENT OF RIGHT HIP: Primary | ICD-10-CM

## 2017-02-03 PROCEDURE — 97110 THERAPEUTIC EXERCISES: CPT

## 2017-02-03 NOTE — PROGRESS NOTES
Outpatient Physical Therapy Ortho Treatment Note  [facility]     Patient Name: Kalli Lowe  : 1950  MRN: [id number]  Today's Date: 2/3/2017      Visit Date: 2017  Subjective Improvement: 0%  MD visit: 2-10-17  Visit Number: 3/3  Total Approved:medicare  Recert Date: 17  S/P Right Total Hip (ant approach) 17    Visit Dx:    ICD-10-CM ICD-9-CM   1. Status post total replacement of right hip Z96.641 V43.64   2. Right hip pain M25.551 719.45       There is no problem list on file for this patient.       Past Medical History   Diagnosis Date   • Benign paroxysmal positional vertigo    • Breast lump      left breast benign on u/s guided mammotone bx      • Contact dermatitis      olecranon area from chair material?     • Cough    • Encounter for long-term (current) use of medications    • GERD (gastroesophageal reflux disease)    • H/O screening mammography    • Hip pain    • History of Holter monitoring 2013     Overall rhythm sinus rhythm with average heart rate of 73 bpm, min 49 , max 111. Frequent isolated with frequent bigeminal cycles, total of 491 bigeminal cycles. No couplets of runs of ventricular tachycardia. Rare isolated PACs, total of 25.   • History of urinary disorder    • Large ovary    • Myalgia    • Osteoarthritis    • Osteopenia    • Otalgia of left ear      resolved spontaneously      • Palpitations    • Postmenopausal state    • Screening for malignant neoplasm of breast       left breast benign mammotone bx      • Screening for osteoporosis    • Vertigo      resolved        Past Surgical History   Procedure Laterality Date   • Breast biopsy  2014     Ultrasound guided left breast mammotome biopsy with 8 gauge needle and also left a clip.   • Colonoscopy  2013     Hemorrhoids found.   • Pap smear  2015     WNL, CARD SENT, DR. CARLSON'S OFFICE   • Injection of medication  2015     PNEUMOC VAC/ADMIN/RCVD 4040F (1)      • Hip  surgery Right 01/24/2017     Total hip-lateral approach             PT Ortho       02/03/17 1245    Precautions and Contraindications    Precautions/Limitations anterior hip precautions- right;hip precautions- right  -KH    Contraindications Hip Precautions/Protocol in chart  -      User Key  (r) = Recorded By, (t) = Taken By, (c) = Cosigned By    Initials Name Provider Type    CECIL Ball PTA Physical Therapy Assistant                            PT Assessment/Plan       02/03/17 1245 02/01/17 1645 01/30/17 1700    PT Assessment    Functional Limitations   Decreased safety during functional activities;Impaired gait;Impaired locomotion;Performance in leisure activities;Performance in self-care ADL  -BB    Impairments   Balance;Endurance;Gait;Impaired flexibility;Impaired muscle endurance;Joint mobility;Locomotion;Motor function;Muscle strength;Pain;Range of motion;Other (comment)   Swelling  -BB    Assessment Comments Attempted Pro 2 but unable secondary to pain in hip and just uncomfortable,  Did well with treatment otherwise with improved gait step though.  VC still needed to increase wt through R HIP  - Remains very cautious and guarded with ambulation with r. walker.  Good ROM hip flexion to 80 degrees with no pain good understanding of precautions   -SP Patient presents postoperatively with decreased gait, balance, pain, muscular endurance and would benefit from skilled PT services addressing deficits. Patient educated on transfer training while maintaining hip precautions for lateral approach    Tubigrip dispensed for edema in RLE.   -BB    Rehab Potential   Good  -BB    Patient/caregiver participated in establishment of treatment plan and goals   Yes  -BB    Patient would benefit from skilled therapy intervention   Yes  -BB    PT Plan    PT Frequency  3x/week  -SP 3x/week  -BB    Predicted Duration of Therapy Intervention (days/wks)  6-8 weeks  -SP 6-8 weeks    per MD order  -BB    Planned CPT's?   PT  EVAL: 36855;PT RE-EVAL: 65893;PT THER PROC EA 15 MIN: 90489;PT MANUAL THERAPY EA 15 MIN: 86288;PT NEUROMUSC RE-EDUCATION EA 15 MIN: 40832;PT GAIT TRAINING EA 15 MIN: 33085;PT ELECTRICAL STIM UNATTEND: ;PT ELECTRICAL STIM ATTD EA 15 MIN: 06396;PT ULTRASOUND EA 15 MIN: 78528;PT THER SUPP EA 15 MIN  -BB    Physical Therapy Interventions (Optional Details)   balance training;bed mobility training;gait training;home exercise program;joint mobilization;manual therapy techniques;modalities;neuromuscular re-education;ROM (Range of Motion);stair training;strengthening;stretching;transfer training  -BB    PT Plan Comments Continue per protocol.  Increase Wb and gait as tolerated.  Clam shells next.   -KH Continue with week 0-2 hip protocol continue with THR precautions   add ham sets seated over tball or supine <90  step thrus and work on increasing ambulation  -SP Progress to SC as able safely once quad control seen.   -BB      User Key  (r) = Recorded By, (t) = Taken By, (c) = Cosigned By    Initials Name Provider Type    BB Alma Alcantara, PT Physical Therapist    CECIL Ball, [facility] Physical Therapy Assistant    SP Mary Kate Craig, [facility] Physical Therapy Assistant                    Exercises       02/03/17 1245          Subjective Comments    Subjective Comments Walking more at home but it hurts.  Doing 80ftx4 (takes about 5 minutes)  -      Subjective Pain    Able to rate subjective pain? yes  -      Pre-Treatment Pain Level 2  -      Post-Treatment Pain Level 2  -      Subjective Pain Comment Most pain across right ant hip and quad muscle.,   -KH      Exercise 1    Exercise Name 1 AMB with RW with good step through gait this date.  -      Cueing 1 Verbal   VC to even weight through B LE  -KH      Sets 1 1  -KH      Reps 1 10  -KH      Exercise 2    Exercise Name 2 Bridges  -KH      Cueing 2 --  -KH      Sets 2 2  -KH      Reps 2 10  -KH      Time (Seconds) 2 --  -KH      Exercise 3    Exercise Name 3 Bent  Knee Leg LEift  -KH      Cueing 3 --  -KH      Sets 3 2  -KH      Reps 3 10  -KH      Exercise 4    Exercise Name 4 DKTC to 90 with deet on ball  -KH      Cueing 4 --  -KH      Sets 4 2  -KH      Reps 4 10  -KH      Exercise 5    Exercise Name 5 Ham Sets with Feet on Ball   -KH      Sets 5 2  -KH      Reps 5 10  -KH      Exercise 6    Exercise Name 6 SAQ over LG bolster  -KH      Sets 6 3  -KH      Reps 6 10  -KH      Exercise 7    Exercise Name 7 Standing CR/TR  -KH      Sets 7 2  -KH      Reps 7 10  -KH      Exercise 8    Exercise Name 8 Mini Squats  -KH      Sets 8 2  -KH      Reps 8 10  -KH      Exercise 9    Exercise Name 9 Incline Stretch  -KH      Reps 9 3  -KH      Time (Seconds) 9 30  -KH      Exercise 10    Exercise Name 10 Hip flexor stretch at step  -KH      Reps 10 3  -KH      Time (Seconds) 10 30  -KH        User Key  (r) = Recorded By, (t) = Taken By, (c) = Cosigned By    Initials Name Provider Type    CECIL Ball, PTA Physical Therapy Assistant                               PT OP Goals       02/03/17 1245 02/01/17 1649 02/01/17 1600    PT Short Term Goals    STG Date to Achieve 02/20/17  -KH  02/20/17  -SP    STG 1 Independent in HEP   -KH  Independent in HEP   -SP    STG 1 Progress Progressing  -KH  Progressing  -SP    STG 2 SLR x10 no lag  -KH  SLR x10 no lag  -SP    STG 2 Progress Not Met  -KH  Not Met  -SP    STG 3 Sit to stand no UE assist  -KH  Sit to stand no UE assist  -SP    STG 3 Progress Not Met  -KH  Not Met  -SP    Long Term Goals    LTG Date to Achieve 03/13/17  -KH  03/13/17  -SP    LTG 1 Independent in final HEP   -KH  Independent in final HEP   -SP    LTG 1 Progress Not Met  -KH  Not Met  -SP    LTG 2 SLS RLE 5 seconds level ground with eyes open  -KH  SLS RLE 5 seconds level ground with eyes open  -SP    LTG 2 Progress Not Met  -KH  Not Met  -SP    LTG 3 Hip flexion 5/5  -KH  Hip flexion 5/5  -SP    LTG 3 Progress Not Met  -KH  Not Met  -SP    LTG 4 Hip abduction 5/5  -KH  Hip  abduction 5/5  -SP    LTG 4 Progress Not Met  -KH  Not Met  -SP    LTG 5 No increase in pain with 6 minute walk test  -KH  No increase in pain with 6 minute walk test  -SP    LTG 5 Progress Not Met  -KH  Not Met  -SP    Time Calculation    PT Goal Re-Cert Due Date 02/20/17  -KH 02/20/17  -SP 02/20/17  -SP      01/30/17 1745 01/30/17 1700       PT Short Term Goals    STG Date to Achieve  02/20/17  -BB     STG 1  Independent in HEP   -BB     STG 1 Progress  New  -BB     STG 2  SLR x10 no lag  -BB     STG 2 Progress  New  -BB     STG 3  Sit to stand no UE assist  -BB     STG 3 Progress  New  -BB     Long Term Goals    LTG Date to Achieve  03/13/17  -BB     LTG 1  Independent in final HEP   -BB     LTG 1 Progress  New  -BB     LTG 2  SLS RLE 5 seconds level ground with eyes open  -BB     LTG 2 Progress  New  -BB     LTG 3  Hip flexion 5/5  -BB     LTG 3 Progress  New  -BB     LTG 4  Hip abduction 5/5  -BB     LTG 4 Progress  New  -BB     LTG 5  No increase in pain with 6 minute walk test  -BB     LTG 5 Progress  New  -BB     Time Calculation    PT Goal Re-Cert Due Date 02/20/17  -BB 02/20/17  -BB       User Key  (r) = Recorded By, (t) = Taken By, (c) = Cosigned By    Initials Name Provider Type    BB Alma Blue, PT Physical Therapist    CECIL Ball, PTA Physical Therapy Assistant    FREEDOM Craig, [facility] Physical Therapy Assistant                Therapy Education       02/03/17 1335    Therapy Education    Given HEP   weight shifting  -KH    Program New  -KH    How Provided Verbal;Demonstration  -KH    Provided to Patient  -KH    Level of Understanding Demonstrated  -KH      01/30/17 1700    Therapy Education    Given HEP  -BB    Program New  -BB    How Provided Verbal  -BB    Provided to Patient  -BB    Level of Understanding Verbalized;Demonstrated  -BB      User Key  (r) = Recorded By, (t) = Taken By, (c) = Cosigned By    Initials Name Provider Type    BB Alma Blue, PT Physical Therapist    CECIL Ball,  PTA Physical Therapy Assistant                Time Calculation:   Start Time: 1245  Stop Time: 1330  Time Calculation (min): 45 min  Total Timed Code Minutes- PT: 45 minute(s)    Therapy Charges for Today     Code Description Service Date Service Provider Modifiers Qty    [id number] HC PT THER PROC EA 15 MIN 2/3/2017 Sheree Ball PTA GP 3                    Sheree Ball PTA  2/3/2017

## 2017-02-06 ENCOUNTER — HOSPITAL ENCOUNTER (OUTPATIENT)
Dept: PHYSICAL THERAPY | Facility: HOSPITAL | Age: 67
Setting detail: THERAPIES SERIES
Discharge: HOME | End: 2017-02-06

## 2017-02-06 DIAGNOSIS — Z96.641 STATUS POST TOTAL REPLACEMENT OF RIGHT HIP: Primary | ICD-10-CM

## 2017-02-06 DIAGNOSIS — M25.551 RIGHT HIP PAIN: ICD-10-CM

## 2017-02-06 PROCEDURE — 97110 THERAPEUTIC EXERCISES: CPT

## 2017-02-06 PROCEDURE — 97116 GAIT TRAINING THERAPY: CPT

## 2017-02-06 NOTE — PROGRESS NOTES
Outpatient Physical Therapy Ortho Treatment Note  [facility]     Patient Name: Kalli Lowe  : 1950  MRN: [id number]  Today's Date: 2017      Visit Date: 2017   Subjective Improvement: 0%  MD visit: 2-10-17  Visit Number:   Total Approved:medicare  Recert Date: 17    DX: R RICKY ( ant approach) 17 ( 2 weeks today)      Visit Dx:    ICD-10-CM ICD-9-CM   1. Status post total replacement of right hip Z96.641 V43.64   2. Right hip pain M25.551 719.45       There is no problem list on file for this patient.       Past Medical History   Diagnosis Date   • Benign paroxysmal positional vertigo    • Breast lump      left breast benign on u/s guided mammotone bx      • Contact dermatitis      olecranon area from chair material?     • Cough    • Encounter for long-term (current) use of medications    • GERD (gastroesophageal reflux disease)    • H/O screening mammography    • Hip pain    • History of Holter monitoring 2013     Overall rhythm sinus rhythm with average heart rate of 73 bpm, min 49 , max 111. Frequent isolated with frequent bigeminal cycles, total of 491 bigeminal cycles. No couplets of runs of ventricular tachycardia. Rare isolated PACs, total of 25.   • History of urinary disorder    • Large ovary    • Myalgia    • Osteoarthritis    • Osteopenia    • Otalgia of left ear      resolved spontaneously      • Palpitations    • Postmenopausal state    • Screening for malignant neoplasm of breast       left breast benign mammotone bx      • Screening for osteoporosis    • Vertigo      resolved        Past Surgical History   Procedure Laterality Date   • Breast biopsy  2014     Ultrasound guided left breast mammotome biopsy with 8 gauge needle and also left a clip.   • Colonoscopy  2013     Hemorrhoids found.   • Pap smear  2015     WNL, CARD SENT, DR. CARLSON'S OFFICE   • Injection of medication  2015     PNEUMOC VAC/ADMIN/RCVD 4040F (1)       • Hip surgery Right 01/24/2017     Total hip-lateral approach             PT Ortho       02/06/17 1106    Precautions and Contraindications    Precautions/Limitations anterior hip precautions- right  -    Contraindications Hip Precautions and Protocol in chart. (paper)  -      02/03/17 1245    Precautions and Contraindications    Precautions/Limitations anterior hip precautions- right;hip precautions- right  -    Contraindications Hip Precautions/Protocol in chart  -      User Key  (r) = Recorded By, (t) = Taken By, (c) = Cosigned By    Initials Name Provider Type    CECIL Ball PTA Physical Therapy Assistant                            PT Assessment/Plan       02/06/17 1105 02/03/17 1245 02/01/17 1645    PT Assessment    Assessment Comments pT VERY TIMID AND GUARDED ABOUT PUTTING WEIGHT THROUGH R LEG.  ENCOURAGED PT TO INCREASE AS TOLERATED.  PN IN R KNEE VS. R HIP. encouraged pt to ice and heat right knee to see if pain would decrease.  increased HEP this date and encouraged pt to walk.   - Attempted Pro 2 but unable secondary to pain in hip and just uncomfortable,  Did well with treatment otherwise with improved gait step though.  VC still needed to increase wt through R HIP  - Remains very cautious and guarded with ambulation with r. walker.  Good ROM hip flexion to 80 degrees with no pain good understanding of precautions   -SP    Please refer to paper survey for additional self-reported information Yes  -      PT Plan    PT Frequency   3x/week  -SP    Predicted Duration of Therapy Intervention (days/wks)   6-8 weeks  -SP    PT Plan Comments Pt to call MD if pain increase continues.  Continue to work on gait and knee and hip strength as able.    -KH Continue per protocol.  Increase Wb and gait as tolerated.  Clam shells next.   - Continue with week 0-2 hip protocol continue with THR precautions   add ham sets seated over tball or supine <90  step thrus and work on increasing ambulation  -SP       01/30/17 1700          PT Assessment    Functional Limitations Decreased safety during functional activities;Impaired gait;Impaired locomotion;Performance in leisure activities;Performance in self-care ADL  -BB      Impairments Balance;Endurance;Gait;Impaired flexibility;Impaired muscle endurance;Joint mobility;Locomotion;Motor function;Muscle strength;Pain;Range of motion;Other (comment)   Swelling  -BB      Assessment Comments Patient presents postoperatively with decreased gait, balance, pain, muscular endurance and would benefit from skilled PT services addressing deficits. Patient educated on transfer training while maintaining hip precautions for lateral approach    Tubigrip dispensed for edema in RLE.   -BB      Rehab Potential Good  -BB      Patient/caregiver participated in establishment of treatment plan and goals Yes  -BB      Patient would benefit from skilled therapy intervention Yes  -BB      PT Plan    PT Frequency 3x/week  -BB      Predicted Duration of Therapy Intervention (days/wks) 6-8 weeks    per MD order  -BB      Planned CPT's? PT EVAL: 28724;PT RE-EVAL: 18326;PT THER PROC EA 15 MIN: 89259;PT MANUAL THERAPY EA 15 MIN: 51088;PT NEUROMUSC RE-EDUCATION EA 15 MIN: 92589;PT GAIT TRAINING EA 15 MIN: 91748;PT ELECTRICAL STIM UNATTEND: ;PT ELECTRICAL STIM ATTD EA 15 MIN: 77466;PT ULTRASOUND EA 15 MIN: 16281;PT THER SUPP EA 15 MIN  -BB      Physical Therapy Interventions (Optional Details) balance training;bed mobility training;gait training;home exercise program;joint mobilization;manual therapy techniques;modalities;neuromuscular re-education;ROM (Range of Motion);stair training;strengthening;stretching;transfer training  -BB      PT Plan Comments Progress to SC as able safely once quad control seen.   -BB        User Key  (r) = Recorded By, (t) = Taken By, (c) = Cosigned By    Initials Name Provider Type    ANA Alcantara, PT Physical Therapist    CECIL Ball, PTA Physical Therapy  Assistant    SP Mary Kate Craig PTA Physical Therapy Assistant                    Exercises       02/06/17 1106          Subjective Comments    Subjective Comments Decreased walking over the weekend secondary to increased pain with walking.  Reports not remembering doing anything different. Continues to go up and down 26 steps at home to get into her house but walkinh is the most difficult  -KH      Subjective Pain    Able to rate subjective pain? yes  -KH      Pre-Treatment Pain Level 3  -KH      Post-Treatment Pain Level 3  -KH      Subjective Pain Comment Most pain with WB exercises through right leg.    -KH      Exercise 1    Exercise Name 1 AMB with RW with good step through gait this date.   360 feet 1x  -KH      Cueing 1 Verbal   VC to even weight through B LE  -KH      Sets 1 --  -KH      Reps 1 --  -KH      Treatment Type 1 Gait Training  -KH      Exercise 2    Exercise Name 2 ham Sets over ball  -KH      Cueing 2 --  -KH      Sets 2 2  -KH      Reps 2 10  -KH      Time (Seconds) 2 3 second hold  -KH      Exercise 3    Exercise Name 3 DKTC with feet o ball  -KH      Cueing 3 --  -KH      Sets 3 2  -KH      Reps 3 10  -KH      Exercise 4    Exercise Name 4 SAQ B small bolster  -KH      Cueing 4 --  -KH      Sets 4 2  -KH      Reps 4 10  -KH      Exercise 5    Exercise Name 5 Step throughs with L/R stationary to increase wieight on R  -KH      Sets 5 1  -KH      Reps 5 15  -KH      Exercise 6    Exercise Name 6 Standing March with HHA  -KH      Sets 6 2  -KH      Reps 6 10  -KH      Exercise 7    Exercise Name 7 Standing CR/TR  -KH      Sets 7 2  -KH      Reps 7 10  -KH      Exercise 8    Exercise Name 8 Mini Squats  -KH      Sets 8 2  -KH      Reps 8 10  -KH      Exercise 9    Exercise Name 9 Long sit Hamstring Stretch R  -KH      Reps 9 3  -KH      Time (Seconds) 9 30  -KH      Exercise 10    Exercise Name 10 Side stepping 10feet  -KH      Reps 10 8  -KH      Time (Seconds) 10 --  -KH        User Key  (r)  = Recorded By, (t) = Taken By, (c) = Cosigned By    Initials Name Provider Type     Sheree Ball, PTA Physical Therapy Assistant                               PT OP Goals       02/06/17 1105 02/03/17 1245 02/01/17 1649    PT Short Term Goals    STG Date to Achieve 02/20/17  -KH 02/20/17  -KH     STG 1 Independent in HEP   -KH Independent in HEP   -KH     STG 1 Progress Progressing  -KH Progressing  -KH     STG 2 SLR x10 no lag  -KH SLR x10 no lag  -KH     STG 2 Progress Not Met  -KH Not Met  -KH     STG 3 Sit to stand no UE assist  -KH Sit to stand no UE assist  -KH     STG 3 Progress Not Met  -KH Not Met  -KH     Long Term Goals    LTG Date to Achieve 03/13/17  -KH 03/13/17  -KH     LTG 1 Independent in final HEP   -KH Independent in final HEP   -KH     LTG 1 Progress Not Met  -KH Not Met  -KH     LTG 2 SLS RLE 5 seconds level ground with eyes open  -KH SLS RLE 5 seconds level ground with eyes open  -KH     LTG 2 Progress Not Met  -KH Not Met  -KH     LTG 3 Hip flexion 5/5  -KH Hip flexion 5/5  -KH     LTG 3 Progress Not Met  -KH Not Met  -KH     LTG 4 Hip abduction 5/5  -KH Hip abduction 5/5  -KH     LTG 4 Progress Not Met  -KH Not Met  -KH     LTG 5 No increase in pain with 6 minute walk test  -KH No increase in pain with 6 minute walk test  -KH     LTG 5 Progress Not Met  -KH Not Met  -KH     Time Calculation    PT Goal Re-Cert Due Date 02/20/17  -KH 02/20/17  -KH 02/20/17  -SP      02/01/17 1600 01/30/17 1745 01/30/17 1700    PT Short Term Goals    STG Date to Achieve 02/20/17  -SP  02/20/17  -BB    STG 1 Independent in HEP   -SP  Independent in HEP   -BB    STG 1 Progress Progressing  -SP  New  -BB    STG 2 SLR x10 no lag  -SP  SLR x10 no lag  -BB    STG 2 Progress Not Met  -SP  New  -BB    STG 3 Sit to stand no UE assist  -SP  Sit to stand no UE assist  -BB    STG 3 Progress Not Met  -SP  New  -BB    Long Term Goals    LTG Date to Achieve 03/13/17  -SP  03/13/17  -BB    LTG 1 Independent in final HEP   -SP   Independent in final HEP   -BB    LTG 1 Progress Not Met  -SP  New  -BB    LTG 2 SLS RLE 5 seconds level ground with eyes open  -SP  SLS RLE 5 seconds level ground with eyes open  -BB    LTG 2 Progress Not Met  -SP  New  -BB    LTG 3 Hip flexion 5/5  -SP  Hip flexion 5/5  -BB    LTG 3 Progress Not Met  -SP  New  -BB    LTG 4 Hip abduction 5/5  -SP  Hip abduction 5/5  -BB    LTG 4 Progress Not Met  -SP  New  -BB    LTG 5 No increase in pain with 6 minute walk test  -SP  No increase in pain with 6 minute walk test  -BB    LTG 5 Progress Not Met  -SP  New  -BB    Time Calculation    PT Goal Re-Cert Due Date 02/20/17  -SP 02/20/17  -BB 02/20/17  -BB      User Key  (r) = Recorded By, (t) = Taken By, (c) = Cosigned By    Initials Name Provider Type    ANA Alcantara, PT Physical Therapist    CECIL Ball, PTA Physical Therapy Assistant    FREEDOM Craig [facility] Physical Therapy Assistant                Therapy Education       02/06/17 1105    Therapy Education    Given HEP   CR/TR MINI SQUATS  -KH    Program New  -KH    How Provided Written  -KH    Provided to Patient  -KH    Level of Understanding Demonstrated  -KH      02/03/17 1335    Therapy Education    Given HEP   weight shifting  -KH    Program New  -KH    How Provided Verbal;Demonstration  -KH    Provided to Patient  -KH    Level of Understanding Demonstrated  -KH      01/30/17 1700    Therapy Education    Given HEP  -BB    Program New  -BB    How Provided Verbal  -BB    Provided to Patient  -BB    Level of Understanding Verbalized;Demonstrated  -BB      User Key  (r) = Recorded By, (t) = Taken By, (c) = Cosigned By    Initials Name Provider Type    ANA Alcantara, PT Physical Therapist    CECIL Ball, [facility] Physical Therapy Assistant                Time Calculation:   Start Time: 1105  Stop Time: 1200  Time Calculation (min): 55 min  Total Timed Code Minutes- PT: 55 minute(s)    Therapy Charges for Today     Code Description Service Date Service Provider  Modifiers Qty    [id number]  PT THER PROC EA 15 MIN 2/6/2017 Sheree Ball, [facility] GP 3    [id number] HC GAIT TRAINING EA 15 MIN 2/6/2017 Sheree Ball PTA GP 1                    Sheree Ball PTA  2/6/2017

## 2017-02-08 ENCOUNTER — HOSPITAL ENCOUNTER (OUTPATIENT)
Dept: PHYSICAL THERAPY | Facility: HOSPITAL | Age: 67
Setting detail: THERAPIES SERIES
Discharge: HOME | End: 2017-02-08

## 2017-02-08 DIAGNOSIS — M25.551 RIGHT HIP PAIN: ICD-10-CM

## 2017-02-08 DIAGNOSIS — Z96.641 STATUS POST TOTAL REPLACEMENT OF RIGHT HIP: Primary | ICD-10-CM

## 2017-02-08 PROCEDURE — 97110 THERAPEUTIC EXERCISES: CPT

## 2017-02-08 PROCEDURE — 97116 GAIT TRAINING THERAPY: CPT

## 2017-02-08 NOTE — PROGRESS NOTES
Outpatient Physical Therapy Ortho Treatment Note  [facility]     Patient Name: Kalli Lowe  : 1950  MRN: [id number]  Today's Date: 2017      Visit Date: 2017  Subjective Improvement: 0%  MD visit: 2-10-17  Visit Number:   Total Approved:medicare  Recert Date: 17    Visit Dx: s/p Right RICKY 17 (2 weeks and 2 days) anterior approach    ICD-10-CM ICD-9-CM   1. Status post total replacement of right hip Z96.641 V43.64   2. Right hip pain M25.551 719.45       There is no problem list on file for this patient.       Past Medical History   Diagnosis Date   • Benign paroxysmal positional vertigo    • Breast lump      left breast benign on u/s guided mammotone bx      • Contact dermatitis      olecranon area from chair material?     • Cough    • Encounter for long-term (current) use of medications    • GERD (gastroesophageal reflux disease)    • H/O screening mammography    • Hip pain    • History of Holter monitoring 2013     Overall rhythm sinus rhythm with average heart rate of 73 bpm, min 49 , max 111. Frequent isolated with frequent bigeminal cycles, total of 491 bigeminal cycles. No couplets of runs of ventricular tachycardia. Rare isolated PACs, total of 25.   • History of urinary disorder    • Large ovary    • Myalgia    • Osteoarthritis    • Osteopenia    • Otalgia of left ear      resolved spontaneously      • Palpitations    • Postmenopausal state    • Screening for malignant neoplasm of breast       left breast benign mammotone bx      • Screening for osteoporosis    • Vertigo      resolved        Past Surgical History   Procedure Laterality Date   • Breast biopsy  2014     Ultrasound guided left breast mammotome biopsy with 8 gauge needle and also left a clip.   • Colonoscopy  2013     Hemorrhoids found.   • Pap smear  2015     WNL, CARD SENT, DR. CARLSON'S OFFICE   • Injection of medication  2015     PNEUMOC VAC/ADMIN/RCVD 9966F (1)       • Hip surgery Right 01/24/2017     Total hip-lateral approach             PT Ortho       02/08/17 1100    Precautions and Contraindications    Precautions/Limitations anterior hip precautions- right  -    Contraindications HIp Precautions/Protocol in chart  -    Subjective Pain    Post-Treatment Pain Level 3  -    Gait Assessment/Treatment    Gait, [place] Level stand by assist  -    Gait, Assistive Device rollator;straight cane  -    Gait, Distance (Feet) 60  -    Gait, Maintain Weight Bearing Status able to maintain weight bearing status  -    Gait, Comment Good step through this date with decrease izzy through UE  -      02/06/17 1106    Precautions and Contraindications    Precautions/Limitations anterior hip precautions- right  -    Contraindications Hip Precautions and Protocol in chart. (paper)  -      User Key  (r) = Recorded By, (t) = Taken By, (c) = Cosigned By    Initials Name Provider Type    CECIL Ball PTA Physical Therapy Assistant                            PT Assessment/Plan       02/08/17 1100 02/06/17 1105 02/03/17 1245    PT Assessment    Assessment Comments Continues to have pain in right knee with execises and walking.  Did well with SPC today.  Pt to get one for home use.  MD to be faxed this afternoon.  Uses rollator throughout treatment with increased equal WB through B LE.   - pT VERY TIMID AND GUARDED ABOUT PUTTING WEIGHT THROUGH R LEG.  ENCOURAGED PT TO INCREASE AS TOLERATED.  PN IN R KNEE VS. R HIP. encouraged pt to ice and heat right knee to see if pain would decrease.  increased HEP this date and encouraged pt to walk.   - Attempted Pro 2 but unable secondary to pain in hip and just uncomfortable,  Did well with treatment otherwise with improved gait step though.  VC still needed to increase wt through R HIP  -    Please refer to paper survey for additional self-reported information  Yes  -     PT Plan    PT Plan Comments Continue to work on  gait with cane.  Continue as MD recommends. Laterla and fwd steps ups agian next treatment.   -KH Pt to call MD if pain increase continues.  Continue to work on gait and knee and hip strength as able.    -KH Continue per protocol.  Increase Wb and gait as tolerated.  Clam shells next.   -KH      02/01/17 1645          PT Assessment    Assessment Comments Remains very cautious and guarded with ambulation with r. walker.  Good ROM hip flexion to 80 degrees with no pain good understanding of precautions   -SP      PT Plan    PT Frequency 3x/week  -SP      Predicted Duration of Therapy Intervention (days/wks) 6-8 weeks  -SP      PT Plan Comments Continue with week 0-2 hip protocol continue with THR precautions   add ham sets seated over tball or supine <90  step thrus and work on increasing ambulation  -SP        User Key  (r) = Recorded By, (t) = Taken By, (c) = Cosigned By    Initials Name Provider Type    CECIL Ball, CRYSTAL Physical Therapy Assistant    SP Mary Kate Craig PTA Physical Therapy Assistant                    Exercises       02/08/17 1100          Subjective Comments    Subjective Comments Doing walking at home jsut not for 5 minutes straight.  Up and down 26 steps at home and walked a half a block before coming to PT today.    -KH      Subjective Pain    Able to rate subjective pain? yes  -KH      Pre-Treatment Pain Level 2  -KH      Post-Treatment Pain Level 3  -KH      Subjective Pain Comment Most pain in right knee  -KH      Exercise 1    Exercise Name 1 Pro ll Level 2 Seat 8  -KH      Time (Minutes) 1 10  -KH      Exercise 2    Exercise Name 2 Incline stretch  -KH      Reps 2 3  -KH      Time (Seconds) 2 30  -KH      Exercise 3    Exercise Name 3 Standing Hip flexor Stretch  -KH      Reps 3 3  -KH      Time (Seconds) 3 30  -KH      Exercise 4    Exercise Name 4 Standing Hip ABD B  -KH      Sets 4 2  -KH      Reps 4 10  -KH      Exercise 5    Exercise Name 5 Standing Hip Flex March  -KH      Sets 5 2   -KH      Reps 5 10  -KH      Exercise 6    Exercise Name 6 Standing Hip EXT B  -KH      Sets 6 2  -KH      Reps 6 10  -KH      Exercise 7    Exercise Name 7 Gait with SPC  -KH      Cueing 7 Verbal  -KH      Time (Seconds) 7 30ft  x2  -KH      Exercise 8    Exercise Name 8 Step Ups FWD R  -KH      Sets 8 1  -KH      Reps 8 10  -KH        User Key  (r) = Recorded By, (t) = Taken By, (c) = Cosigned By    Initials Name Provider Type    CECIL Ball PTA Physical Therapy Assistant                               PT OP Goals       02/08/17 1100 02/06/17 1105 02/03/17 1245    PT Short Term Goals    STG Date to Achieve 02/20/17  -KH 02/20/17  -KH 02/20/17  -KH    STG 1 Independent in HEP   -KH Independent in HEP   -KH Independent in HEP   -KH    STG 1 Progress Progressing  -KH Progressing  -KH Progressing  -KH    STG 2 SLR x10 no lag  -KH SLR x10 no lag  -KH SLR x10 no lag  -KH    STG 2 Progress Not Met  -KH Not Met  -KH Not Met  -KH    STG 3 Sit to stand no UE assist  -KH Sit to stand no UE assist  -KH Sit to stand no UE assist  -KH    STG 3 Progress Not Met  -KH Not Met  -KH Not Met  -KH    Long Term Goals    LTG Date to Achieve 03/13/17  -KH 03/13/17  -KH 03/13/17  -KH    LTG 1 Independent in final HEP   -KH Independent in final HEP   -KH Independent in final HEP   -KH    LTG 1 Progress Not Met  -KH Not Met  -KH Not Met  -KH    LTG 2 SLS RLE 5 seconds level ground with eyes open  -KH SLS RLE 5 seconds level ground with eyes open  -KH SLS RLE 5 seconds level ground with eyes open  -KH    LTG 2 Progress Not Met  -KH Not Met  -KH Not Met  -KH    LTG 3 Hip flexion 5/5  -KH Hip flexion 5/5  -KH Hip flexion 5/5  -KH    LTG 3 Progress Not Met  -KH Not Met  -KH Not Met  -KH    LTG 4 Hip abduction 5/5  -KH Hip abduction 5/5  -KH Hip abduction 5/5  -KH    LTG 4 Progress Not Met  -KH Not Met  -KH Not Met  -KH    LTG 5 No increase in pain with 6 minute walk test  -KH No increase in pain with 6 minute walk test  -KH No increase in  pain with 6 minute walk test  -KH    LTG 5 Progress Not Met  -KH Not Met  -KH Not Met  -KH    Time Calculation    PT Goal Re-Cert Due Date 02/20/17  -KH 02/20/17  -KH 02/20/17  -KH      02/01/17 1649 02/01/17 1600 01/30/17 1745    PT Short Term Goals    STG Date to Achieve  02/20/17  -SP     STG 1  Independent in HEP   -SP     STG 1 Progress  Progressing  -SP     STG 2  SLR x10 no lag  -SP     STG 2 Progress  Not Met  -SP     STG 3  Sit to stand no UE assist  -SP     STG 3 Progress  Not Met  -SP     Long Term Goals    LTG Date to Achieve  03/13/17  -SP     LTG 1  Independent in final HEP   -SP     LTG 1 Progress  Not Met  -SP     LTG 2  SLS RLE 5 seconds level ground with eyes open  -SP     LTG 2 Progress  Not Met  -SP     LTG 3  Hip flexion 5/5  -SP     LTG 3 Progress  Not Met  -SP     LTG 4  Hip abduction 5/5  -SP     LTG 4 Progress  Not Met  -SP     LTG 5  No increase in pain with 6 minute walk test  -SP     LTG 5 Progress  Not Met  -SP     Time Calculation    PT Goal Re-Cert Due Date 02/20/17  -SP 02/20/17  -SP 02/20/17  -BB      01/30/17 1700          PT Short Term Goals    STG Date to Achieve 02/20/17  -BB      STG 1 Independent in HEP   -BB      STG 1 Progress New  -BB      STG 2 SLR x10 no lag  -BB      STG 2 Progress New  -BB      STG 3 Sit to stand no UE assist  -BB      STG 3 Progress New  -BB      Long Term Goals    LTG Date to Achieve 03/13/17  -BB      LTG 1 Independent in final HEP   -BB      LTG 1 Progress New  -BB      LTG 2 SLS RLE 5 seconds level ground with eyes open  -BB      LTG 2 Progress New  -BB      LTG 3 Hip flexion 5/5  -BB      LTG 3 Progress New  -BB      LTG 4 Hip abduction 5/5  -BB      LTG 4 Progress New  -BB      LTG 5 No increase in pain with 6 minute walk test  -BB      LTG 5 Progress New  -BB      Time Calculation    PT Goal Re-Cert Due Date 02/20/17  -BB        User Key  (r) = Recorded By, (t) = Taken By, (c) = Cosigned By    Initials Name Provider Type    BB Alma Blue, PT  Physical Therapist    CECIL Ball PTA Physical Therapy Assistant    FREEDOM Craig PTA Physical Therapy Assistant                Therapy Education       02/06/17 1105    Therapy Education    Given HEP   CR/TR MINI SQUATS  -KH    Program New  -CECIL    How Provided Written  -KH    Provided to Patient  -KH    Level of Understanding Demonstrated  -KH      02/03/17 1335    Therapy Education    Given HEP   weight shifting  -KH    Program New  -CECIL    How Provided Verbal;Demonstration  -KH    Provided to Patient  -KH    Level of Understanding Demonstrated  -KH      User Key  (r) = Recorded By, (t) = Taken By, (c) = Cosigned By    Initials Name Provider Type    CECIL Ball PTA Physical Therapy Assistant                Time Calculation:   Start Time: 1100  Stop Time: 1145  Time Calculation (min): 45 min  Total Timed Code Minutes- PT: 45 minute(s)    Therapy Charges for Today     Code Description Service Date Service Provider Modifiers Qty    09954894706 HC GAIT TRAINING EA 15 MIN 2/8/2017 Sheree Ball PTA GP 1    30797268802 HC PT THER PROC EA 15 MIN 2/8/2017 Sheree Ball PTA GP 2                    Sheree Ball PTA  2/8/2017

## 2017-02-10 ENCOUNTER — APPOINTMENT (OUTPATIENT)
Dept: PHYSICAL THERAPY | Facility: HOSPITAL | Age: 67
End: 2017-02-10

## 2017-02-13 ENCOUNTER — APPOINTMENT (OUTPATIENT)
Dept: CT IMAGING | Facility: HOSPITAL | Age: 67
End: 2017-02-13

## 2017-02-13 ENCOUNTER — HOSPITAL ENCOUNTER (OUTPATIENT)
Dept: PHYSICAL THERAPY | Facility: HOSPITAL | Age: 67
Setting detail: THERAPIES SERIES
Discharge: HOME | End: 2017-02-13

## 2017-02-13 ENCOUNTER — APPOINTMENT (OUTPATIENT)
Dept: PHYSICAL THERAPY | Facility: HOSPITAL | Age: 67
End: 2017-02-13

## 2017-02-13 DIAGNOSIS — Z96.641 STATUS POST TOTAL REPLACEMENT OF RIGHT HIP: Primary | ICD-10-CM

## 2017-02-13 DIAGNOSIS — M25.551 RIGHT HIP PAIN: ICD-10-CM

## 2017-02-13 PROCEDURE — 97110 THERAPEUTIC EXERCISES: CPT

## 2017-02-13 PROCEDURE — 97116 GAIT TRAINING THERAPY: CPT

## 2017-02-13 NOTE — PROGRESS NOTES
Outpatient Physical Therapy Ortho Treatment Note  [facility]     Patient Name: Kalli Lowe  : 1950  MRN: [id number]  Today's Date: 2/15/2017      Visit Date: 2017   Subjective Improvement: 25%  MD visit: 4 weeks from 17  Visit Number:   Total Approved:medicare  Recert Date: 17  DX: s/p R RICKY 17      Visit Dx:    ICD-10-CM ICD-9-CM   1. Status post total replacement of right hip Z96.641 V43.64   2. Right hip pain M25.551 719.45       There is no problem list on file for this patient.       Past Medical History   Diagnosis Date   • Benign paroxysmal positional vertigo    • Breast lump      left breast benign on u/s guided mammotone bx      • Contact dermatitis      olecranon area from chair material?     • Cough    • Encounter for long-term (current) use of medications    • GERD (gastroesophageal reflux disease)    • H/O screening mammography    • Hip pain    • History of Holter monitoring 2013     Overall rhythm sinus rhythm with average heart rate of 73 bpm, min 49 , max 111. Frequent isolated with frequent bigeminal cycles, total of 491 bigeminal cycles. No couplets of runs of ventricular tachycardia. Rare isolated PACs, total of 25.   • History of urinary disorder    • Large ovary    • Myalgia    • Osteoarthritis    • Osteopenia    • Otalgia of left ear      resolved spontaneously      • Palpitations    • Postmenopausal state    • Screening for malignant neoplasm of breast       left breast benign mammotone bx      • Screening for osteoporosis    • Vertigo      resolved        Past Surgical History   Procedure Laterality Date   • Breast biopsy  2014     Ultrasound guided left breast mammotome biopsy with 8 gauge needle and also left a clip.   • Colonoscopy  2013     Hemorrhoids found.   • Pap smear  2015     WNL, CARD SENT, DR. CARLSON'S OFFICE   • Injection of medication  2015     PNEUMOC VAC/ADMIN/RCVD 4040F (1)      • Hip surgery  Right 01/24/2017     Total hip-lateral approach             PT Ortho       02/15/17 1352    Precautions and Contraindications    Precautions/Limitations anterior hip precautions- right  -    Precautions Total Hip Protocol in chart and media tab  -    Contraindications Hip Precautions/Protocol in chart  -    Gait Assessment/Treatment    Gait, Comment Gait with SPC this date with no trendelenburg noted. No LOB just decreased cadance  -      02/13/17 1258    Gait Assessment/Treatment    Gait, Comment Pt with improved step through with rollator, uses SPC through out treatment with good balance and step length with SPC. D/C rollator except for long distances at this time.   -      User Key  (r) = Recorded By, (t) = Taken By, (c) = Cosigned By    Initials Name Provider Type    CECIL Ball PTA Physical Therapy Assistant                            PT Assessment/Plan       02/15/17 1352 02/13/17 1258       PT Assessment    Impairments  --   Swelling  -     Assessment Comments Good balance with airex and cane this date.  Still some weakness with right LE and fatigue but is improving and trusting Right LE with decrease guarding.   - Good balance with SPC this date.  Continues to have fatigue but is improving with gait and strength in R LE  -     PT Plan    PT Plan Comments Continue to work with gait with cane.  Increase WB balance and strength of the right LE.  Airex mini squat and calf raises for balance next.   - Continue to work on gait with SPC. Lateral step ups next.  -       User Key  (r) = Recorded By, (t) = Taken By, (c) = Cosigned By    Initials Name Provider Type    CECIL Ball PTA Physical Therapy Assistant                    Exercises       02/15/17 1352          Subjective Comments    Subjective Comments Pt reports that she is using her cane more at home and out in community.  Pt repports that she did drive two blocks the other day without difficulty.  Just still fatigues and has right  knee pain  -KH      Subjective Pain    Able to rate subjective pain? yes  -KH      Pre-Treatment Pain Level 2  -KH      Post-Treatment Pain Level 2  -KH      Exercise 1    Exercise Name 1 Pro ll Level 3*  -KH      Time (Minutes) 1 10  -KH      Exercise 2    Exercise Name 2 Incline stretch  -KH      Reps 2 3  -KH      Time (Seconds) 2 30  -KH      Exercise 3    Exercise Name 3 Step Ups 4 inch FWD  -KH      Sets 3 2  -KH      Reps 3 10  -KH      Exercise 4    Exercise Name 4 Step Ups Lat 4 inch  -KH      Sets 4 2  -KH      Reps 4 10  -KH      Exercise 5    Exercise Name 5 Standing March (hip flex) B  -KH      Sets 5 2  -KH      Reps 5 10  -KH      Exercise 6    Exercise Name 6 AIREX- WBOS EO/EC Balance  -KH      Reps 6 3  -KH      Time (Seconds) 6 30  -KH      Exercise 7    Exercise Name 7 AIREX WBOS EO/EC Balance  -KH      Reps 7 3  -KH      Time (Seconds) 7 30  -KH      Exercise 8    Exercise Name 8 Sit-Stands with 2 UE Assist  -KH      Sets 8 1  -KH      Reps 8 10  -KH        User Key  (r) = Recorded By, (t) = Taken By, (c) = Cosigned By    Initials Name Provider Type    CECIL Ball, PTA Physical Therapy Assistant                               PT OP Goals       02/15/17 1352 02/13/17 1258 02/08/17 1100    PT Short Term Goals    STG Date to Achieve 02/20/17  -KH 02/20/17  -KH 02/20/17  -KH    STG 1 Independent in HEP   -KH Independent in HEP   -KH Independent in HEP   -KH    STG 1 Progress Progressing  -KH Progressing  -KH Progressing  -KH    STG 2 SLR x10 no lag  -KH SLR x10 no lag  -KH SLR x10 no lag  -KH    STG 2 Progress Not Met  -KH Not Met  -KH Not Met  -KH    STG 3 Sit to stand no UE assist  -KH Sit to stand no UE assist  -KH Sit to stand no UE assist  -KH    STG 3 Progress Not Met  -KH Not Met  -KH Not Met  -KH    Long Term Goals    LTG Date to Achieve 03/13/17  -KH 03/13/17  -KH 03/13/17  -KH    LTG 1 Independent in final HEP   -KH Independent in final HEP   -KH Independent in final HEP   -KH    LTG 1  Progress Not Met  -KH Not Met  -KH Not Met  -KH    LTG 2 SLS RLE 5 seconds level ground with eyes open  -KH SLS RLE 5 seconds level ground with eyes open  -KH SLS RLE 5 seconds level ground with eyes open  -KH    LTG 2 Progress Not Met  -KH Not Met  -KH Not Met  -KH    LTG 3 Hip flexion 5/5  -KH Hip flexion 5/5  -KH Hip flexion 5/5  -KH    LTG 3 Progress Not Met  -KH Not Met  -KH Not Met  -KH    LTG 4 Hip abduction 5/5  -KH Hip abduction 5/5  -KH Hip abduction 5/5  -KH    LTG 4 Progress Not Met  -KH Not Met  -KH Not Met  -KH    LTG 5 No increase in pain with 6 minute walk test  -KH No increase in pain with 6 minute walk test  -KH No increase in pain with 6 minute walk test  -KH    LTG 5 Progress Not Met  -KH Not Met  -KH Not Met  -KH    Time Calculation    PT Goal Re-Cert Due Date 02/20/17   7/7, 40%. medicare, MD 4 weeks  -KH 02/20/17   MD 4 weeks; 6/6. 25%, medicare  -KH 02/20/17  -KH      02/06/17 1105 02/03/17 1245       PT Short Term Goals    STG Date to Achieve 02/20/17  -KH 02/20/17  -KH     STG 1 Independent in HEP   -KH Independent in HEP   -KH     STG 1 Progress Progressing  -KH Progressing  -KH     STG 2 SLR x10 no lag  -KH SLR x10 no lag  -KH     STG 2 Progress Not Met  -KH Not Met  -KH     STG 3 Sit to stand no UE assist  -KH Sit to stand no UE assist  -KH     STG 3 Progress Not Met  -KH Not Met  -KH     Long Term Goals    LTG Date to Achieve 03/13/17  -KH 03/13/17  -KH     LTG 1 Independent in final HEP   -KH Independent in final HEP   -KH     LTG 1 Progress Not Met  -KH Not Met  -KH     LTG 2 SLS RLE 5 seconds level ground with eyes open  -KH SLS RLE 5 seconds level ground with eyes open  -KH     LTG 2 Progress Not Met  -KH Not Met  -KH     LTG 3 Hip flexion 5/5  -KH Hip flexion 5/5  -KH     LTG 3 Progress Not Met  -KH Not Met  -KH     LTG 4 Hip abduction 5/5  -KH Hip abduction 5/5  -KH     LTG 4 Progress Not Met  -KH Not Met  -KH     LTG 5 No increase in pain with 6 minute walk test  -KH No increase  in pain with 6 minute walk test  -CECIL     LTG 5 Progress Not Met  -CECIL Not Met  -CECIL     Time Calculation    PT Goal Re-Cert Due Date 02/20/17  -KH 02/20/17  -CECIL       User Key  (r) = Recorded By, (t) = Taken By, (c) = Cosigned By    Initials Name Provider Type    CECIL Ball PTA Physical Therapy Assistant                Therapy Education       02/13/17 1258    Therapy Education    Given HEP   Gait with SPC vs Rollator  -KH    Program New  -CECIL    How Provided Verbal;Demonstration  -KH    Provided to Patient  -CECIL    Level of Understanding Demonstrated  -CECIL      User Key  (r) = Recorded By, (t) = Taken By, (c) = Cosigned By    Initials Name Provider Type    CECIL Ball PTA Physical Therapy Assistant                Time Calculation:   Start Time: 1258  Stop Time: 1340  Time Calculation (min): 42 min  Total Timed Code Minutes- PT: 42 minute(s)                  Sheree Ball PTA  2/15/2017     ** added post pain to treatment

## 2017-02-15 ENCOUNTER — HOSPITAL ENCOUNTER (OUTPATIENT)
Dept: PHYSICAL THERAPY | Facility: HOSPITAL | Age: 67
Setting detail: THERAPIES SERIES
Discharge: HOME | End: 2017-02-15

## 2017-02-15 DIAGNOSIS — M25.551 RIGHT HIP PAIN: ICD-10-CM

## 2017-02-15 DIAGNOSIS — Z96.641 STATUS POST TOTAL REPLACEMENT OF RIGHT HIP: Primary | ICD-10-CM

## 2017-02-15 PROCEDURE — 97110 THERAPEUTIC EXERCISES: CPT

## 2017-02-15 NOTE — PROGRESS NOTES
Outpatient Physical Therapy Ortho Treatment Note  [facility]     Patient Name: Kalli Lowe  : 1950  MRN: [id number]  Today's Date: 2/15/2017      Visit Date: 02/15/2017    Subjective Improvement: 40%  MD visit: 4 weeks  Visit Number: 7  Total Approved:medicare  Recert Date: 17    DX: s/p Right RICKY 17    Visit Dx:    ICD-10-CM ICD-9-CM   1. Status post total replacement of right hip Z96.641 V43.64   2. Right hip pain M25.551 719.45       There is no problem list on file for this patient.       Past Medical History   Diagnosis Date   • Benign paroxysmal positional vertigo    • Breast lump      left breast benign on u/s guided mammotone bx      • Contact dermatitis      olecranon area from chair material?     • Cough    • Encounter for long-term (current) use of medications    • GERD (gastroesophageal reflux disease)    • H/O screening mammography    • Hip pain    • History of Holter monitoring 2013     Overall rhythm sinus rhythm with average heart rate of 73 bpm, min 49 , max 111. Frequent isolated with frequent bigeminal cycles, total of 491 bigeminal cycles. No couplets of runs of ventricular tachycardia. Rare isolated PACs, total of 25.   • History of urinary disorder    • Large ovary    • Myalgia    • Osteoarthritis    • Osteopenia    • Otalgia of left ear      resolved spontaneously      • Palpitations    • Postmenopausal state    • Screening for malignant neoplasm of breast       left breast benign mammotone bx      • Screening for osteoporosis    • Vertigo      resolved        Past Surgical History   Procedure Laterality Date   • Breast biopsy  2014     Ultrasound guided left breast mammotome biopsy with 8 gauge needle and also left a clip.   • Colonoscopy  2013     Hemorrhoids found.   • Pap smear  2015     WNL, CARD SENT, DR. CARLSON'S OFFICE   • Injection of medication  2015     PNEUMOC VAC/ADMIN/RCVD 4040F (1)      • Hip surgery Right  01/24/2017     Total hip-lateral approach             PT Ortho       02/15/17 1352    Precautions and Contraindications    Precautions/Limitations anterior hip precautions- right  -    Precautions Total Hip Protocol in chart and media tab  -    Contraindications Hip Precautions/Protocol in chart  -    Gait Assessment/Treatment    Gait, Comment Gait with SPC this date with no trendelenburg noted. No LOB just decreased cadance  -      02/13/17 1258    Gait Assessment/Treatment    Gait, Comment Pt with improved step through with rollator, uses SPC through out treatment with good balance and step length with SPC. D/C rollator except for long distances at this time.   -      User Key  (r) = Recorded By, (t) = Taken By, (c) = Cosigned By    Initials Name Provider Type    CECIL Ball PTA Physical Therapy Assistant                            PT Assessment/Plan       02/15/17 1352 02/13/17 1258       PT Assessment    Impairments  --   Swelling  -     Assessment Comments Good balance with airex and cane this date.  Still some weakness with right LE and fatigue but is improving and trusting Right LE with decrease guarding.   - Good balance with SPC this date.  Continues to have fatigue but is improving with gait and strength in R LE  -     PT Plan    PT Plan Comments Continue to work with gait with cane.  Increase WB balance and strength of the right LE.  Airex mini squat and calf raises for balance next.   - Continue to work on gait with SPC. Lateral step ups next.  -       User Key  (r) = Recorded By, (t) = Taken By, (c) = Cosigned By    Initials Name Provider Type    CECIL Ball PTA Physical Therapy Assistant                    Exercises       02/15/17 1352          Subjective Comments    Subjective Comments Pt reports that she is using her cane more at home and out in community.  Pt repports that she did drive two blocks the other day without difficulty.  Just still fatigues and has right knee  pain  -KH      Subjective Pain    Able to rate subjective pain? yes  -KH      Pre-Treatment Pain Level 2  -KH      Post-Treatment Pain Level 2  -KH      Exercise 1    Exercise Name 1 Pro ll Level 3*  -KH      Time (Minutes) 1 10  -KH      Exercise 2    Exercise Name 2 Incline stretch  -KH      Reps 2 3  -KH      Time (Seconds) 2 30  -KH      Exercise 3    Exercise Name 3 Step Ups 4 inch FWD  -KH      Sets 3 2  -KH      Reps 3 10  -KH      Exercise 4    Exercise Name 4 Step Ups Lat 4 inch  -KH      Sets 4 2  -KH      Reps 4 10  -KH      Exercise 5    Exercise Name 5 Standing March (hip flex) B  -KH      Sets 5 2  -KH      Reps 5 10  -KH      Exercise 6    Exercise Name 6 AIREX- WBOS EO/EC Balance  -KH      Reps 6 3  -KH      Time (Seconds) 6 30  -KH      Exercise 7    Exercise Name 7 AIREX WBOS EO/EC Balance  -KH      Reps 7 3  -KH      Time (Seconds) 7 30  -KH      Exercise 8    Exercise Name 8 Sit-Stands with 2 UE Assist  -KH      Sets 8 1  -KH      Reps 8 10  -KH        User Key  (r) = Recorded By, (t) = Taken By, (c) = Cosigned By    Initials Name Provider Type    CECIL Ball, PTA Physical Therapy Assistant                               PT OP Goals       02/15/17 1352 02/13/17 1258 02/08/17 1100    PT Short Term Goals    STG Date to Achieve 02/20/17  -KH 02/20/17  -KH 02/20/17  -KH    STG 1 Independent in HEP   -KH Independent in HEP   -KH Independent in HEP   -KH    STG 1 Progress Progressing  -KH Progressing  -KH Progressing  -KH    STG 2 SLR x10 no lag  -KH SLR x10 no lag  -KH SLR x10 no lag  -KH    STG 2 Progress Not Met  -KH Not Met  -KH Not Met  -KH    STG 3 Sit to stand no UE assist  -KH Sit to stand no UE assist  -KH Sit to stand no UE assist  -KH    STG 3 Progress Not Met  -KH Not Met  -KH Not Met  -KH    Long Term Goals    LTG Date to Achieve 03/13/17  -KH 03/13/17  -KH 03/13/17  -KH    LTG 1 Independent in final HEP   -KH Independent in final HEP   -KH Independent in final HEP   -KH    LTG 1 Progress  Not Met  -KH Not Met  -KH Not Met  -KH    LTG 2 SLS RLE 5 seconds level ground with eyes open  -KH SLS RLE 5 seconds level ground with eyes open  -KH SLS RLE 5 seconds level ground with eyes open  -KH    LTG 2 Progress Not Met  -KH Not Met  -KH Not Met  -KH    LTG 3 Hip flexion 5/5  -KH Hip flexion 5/5  -KH Hip flexion 5/5  -KH    LTG 3 Progress Not Met  -KH Not Met  -KH Not Met  -KH    LTG 4 Hip abduction 5/5  -KH Hip abduction 5/5  -KH Hip abduction 5/5  -KH    LTG 4 Progress Not Met  -KH Not Met  -KH Not Met  -KH    LTG 5 No increase in pain with 6 minute walk test  -KH No increase in pain with 6 minute walk test  -KH No increase in pain with 6 minute walk test  -KH    LTG 5 Progress Not Met  -KH Not Met  -KH Not Met  -KH    Time Calculation    PT Goal Re-Cert Due Date 02/20/17   7/7, 40%. medicare, MD 4 weeks  -KH 02/20/17   MD 4 weeks; 6/6. 25%, medicare  -KH 02/20/17  -KH      02/06/17 1105 02/03/17 1245       PT Short Term Goals    STG Date to Achieve 02/20/17  -KH 02/20/17  -KH     STG 1 Independent in HEP   -KH Independent in HEP   -KH     STG 1 Progress Progressing  -KH Progressing  -KH     STG 2 SLR x10 no lag  -KH SLR x10 no lag  -KH     STG 2 Progress Not Met  -KH Not Met  -KH     STG 3 Sit to stand no UE assist  -KH Sit to stand no UE assist  -KH     STG 3 Progress Not Met  -KH Not Met  -KH     Long Term Goals    LTG Date to Achieve 03/13/17  -KH 03/13/17  -KH     LTG 1 Independent in final HEP   -KH Independent in final HEP   -KH     LTG 1 Progress Not Met  -KH Not Met  -KH     LTG 2 SLS RLE 5 seconds level ground with eyes open  -KH SLS RLE 5 seconds level ground with eyes open  -KH     LTG 2 Progress Not Met  -KH Not Met  -KH     LTG 3 Hip flexion 5/5  -KH Hip flexion 5/5  -KH     LTG 3 Progress Not Met  -KH Not Met  -KH     LTG 4 Hip abduction 5/5  -KH Hip abduction 5/5  -KH     LTG 4 Progress Not Met  -KH Not Met  -KH     LTG 5 No increase in pain with 6 minute walk test  -KH No increase in pain  with 6 minute walk test  -CECIL     LTG 5 Progress Not Met  -KH Not Met  -CECIL     Time Calculation    PT Goal Re-Cert Due Date 02/20/17  -KH 02/20/17  -CECIL       User Key  (r) = Recorded By, (t) = Taken By, (c) = Cosigned By    Initials Name Provider Type    CECIL Ball PTA Physical Therapy Assistant                Therapy Education       02/13/17 1258    Therapy Education    Given HEP   Gait with SPC vs Rollator  -KH    Program New  -KH    How Provided Verbal;Demonstration  -CECIL    Provided to Patient  -CECIL    Level of Understanding Demonstrated  -CECIL      User Key  (r) = Recorded By, (t) = Taken By, (c) = Cosigned By    Initials Name Provider Type    CECIL Ball PTA Physical Therapy Assistant                Time Calculation:   Start Time: 1352  Stop Time: 1435  Time Calculation (min): 43 min  Total Timed Code Minutes- PT: 43 minute(s)    Therapy Charges for Today     Code Description Service Date Service Provider Modifiers Qty    39380800837 HC PT THER PROC EA 15 MIN 2/15/2017 Sheree Ball PTA GP 3                    Sheree Ball PTA  2/15/2017

## 2017-02-17 ENCOUNTER — HOSPITAL ENCOUNTER (OUTPATIENT)
Dept: PHYSICAL THERAPY | Facility: HOSPITAL | Age: 67
Setting detail: THERAPIES SERIES
Discharge: HOME | End: 2017-02-17

## 2017-02-17 DIAGNOSIS — M25.551 RIGHT HIP PAIN: ICD-10-CM

## 2017-02-17 DIAGNOSIS — Z96.641 STATUS POST TOTAL REPLACEMENT OF RIGHT HIP: Primary | ICD-10-CM

## 2017-02-17 PROCEDURE — 97110 THERAPEUTIC EXERCISES: CPT

## 2017-02-17 PROCEDURE — 97140 MANUAL THERAPY 1/> REGIONS: CPT

## 2017-02-17 NOTE — PROGRESS NOTES
Outpatient Physical Therapy Ortho Treatment Note  [facility]     Patient Name: Kalli Lowe  : 1950  MRN: [id number]  Today's Date: 2017      Visit Date: 2017   Subjective Improvement: 40%  MD visit: 4 weeks  Visit Number:   Total Approved:medicare  Recert Date: 17 (scheduled for 17) secondary to PT availability    DX: s/p right RICKY 17 ( 3 weeks 3 days)       Visit Dx:    ICD-10-CM ICD-9-CM   1. Status post total replacement of right hip Z96.641 V43.64   2. Right hip pain M25.551 719.45       There is no problem list on file for this patient.       Past Medical History   Diagnosis Date   • Benign paroxysmal positional vertigo    • Breast lump      left breast benign on u/s guided mammotone bx      • Contact dermatitis      olecranon area from chair material?     • Cough    • Encounter for long-term (current) use of medications    • GERD (gastroesophageal reflux disease)    • H/O screening mammography    • Hip pain    • History of Holter monitoring 2013     Overall rhythm sinus rhythm with average heart rate of 73 bpm, min 49 , max 111. Frequent isolated with frequent bigeminal cycles, total of 491 bigeminal cycles. No couplets of runs of ventricular tachycardia. Rare isolated PACs, total of 25.   • History of urinary disorder    • Large ovary    • Myalgia    • Osteoarthritis    • Osteopenia    • Otalgia of left ear      resolved spontaneously      • Palpitations    • Postmenopausal state    • Screening for malignant neoplasm of breast       left breast benign mammotone bx      • Screening for osteoporosis    • Vertigo      resolved        Past Surgical History   Procedure Laterality Date   • Breast biopsy  2014     Ultrasound guided left breast mammotome biopsy with 8 gauge needle and also left a clip.   • Colonoscopy  2013     Hemorrhoids found.   • Pap smear  2015     WNL, CARD SENT, DR. CARLSON'S OFFICE   • Injection of medication   08/31/2015     PNEUMOC VAC/ADMIN/RCVD 4040F (1)      • Hip surgery Right 01/24/2017     Total hip-lateral approach             PT Ortho       02/17/17 1108    Precautions and Contraindications    Precautions/Limitations anterior hip precautions- right  -    Precautions Total Hip Protocol   -    Contraindications Hip Precautions/protocol in chart  -KH    Sensation    Additional Comments Decrease scar mobility right hip and TTP over right scar on right hip   -    General LE Assessment    ROM Detail AROM Hip flexion 90   -    Gait Assessment/Treatment    Gait, Comment Gait with SPC mild antalgia  -      02/15/17 1352    Precautions and Contraindications    Precautions/Limitations anterior hip precautions- right  -    Precautions Total Hip Protocol in chart and media tab  -    Contraindications Hip Precautions/Protocol in chart  -KH    Gait Assessment/Treatment    Gait, Comment Gait with SPC this date with no trendelenburg noted. No LOB just decreased cadance  -      User Key  (r) = Recorded By, (t) = Taken By, (c) = Cosigned By    Initials Name Provider Type     Sheree Ball PTA Physical Therapy Assistant                            PT Assessment/Plan       02/17/17 1108 02/15/17 1352 02/13/17 1258    PT Assessment    Impairments   --   Swelling  -    Assessment Comments 3 weeks, 3 days post-op Began scar massage and gentle mobes to help allow for pt to have increase rotation in right hip. and to increase scar moblilty.  Decereased standing exercises this date secondary to pt not feeling well.  Worked on core stab and LE without difficulty in seateed position.   - Good balance with airex and cane this date.  Still some weakness with right LE and fatigue but is improving and trusting Right LE with decrease guarding.   - Good balance with SPC this date.  Continues to have fatigue but is improving with gait and strength in R LE  -    PT Plan    PT Plan Comments Add SLR FF next visit.  Monitor  response to scar massage and gentle mobs to right hip. continue if benifical.  Continue to work on gait and strength in right hip as able.    -KH Continue to work with gait with cane.  Increase WB balance and strength of the right LE.  Airex mini squat and calf raises for balance next.   -KH Continue to work on gait with SPC. Lateral step ups next.  -KH      User Key  (r) = Recorded By, (t) = Taken By, (c) = Cosigned By    Initials Name Provider Type    CECIL Ball PTA Physical Therapy Assistant                    Exercises       02/17/17 1108          Subjective Comments    Subjective Comments Pt reports that she has felt yucky the last couple of days.  Cut back on tylenol to see if that would make a difference.  ALso reports after last treatment she went to [email or web address] and walked the whole store without increase pain  -KH      Subjective Pain    Able to rate subjective pain? yes  -KH      Pre-Treatment Pain Level 2  -KH      Post-Treatment Pain Level 2  -KH      Exercise 1    Exercise Name 1 Pro ll Level 3*  -KH      Time (Minutes) 1 10  -KH      Exercise 2    Exercise Name 2 Incline stretch  -KH      Reps 2 3  -KH      Time (Seconds) 2 30  -KH      Exercise 3    Exercise Name 3 Standinig Hamstirng stretch R  -KH      Reps 3 3  -KH      Time (Seconds) 3 30  -KH      Exercise 4    Exercise Name 4 Standing hip flexor stretch  -KH      Reps 4 3  -KH      Time (Seconds) 4 30  -KH      Exercise 5    Exercise Name 5 Sharri Disc seated march  -KH      Sets 5 2  -KH      Reps 5 10  -KH      Exercise 6    Exercise Name 6 Sharri disc seated LAQ B  -KH      Sets 6 2  -KH      Reps 6 10  -KH      Exercise 7    Exercise Name 7 Sharri disc seated Adduction squeeze  -KH      Sets 7 2  -KH      Reps 7 10  -KH      Exercise 8    Exercise Name 8 Sharri disc hip abd with theraband  -KH      Equipment 8 Theraband  -KH      Resistance 8 Green  -KH      Sets 8 2  -KH      Reps 8 10  -KH        User Key  (r) = Recorded By, (t) = Taken By,  (c) = Cosigned By    Initials Name Provider Type    CECIL BallCRYSTAL Physical Therapy Assistant                        Manual Rx (last 36 hours)      Manual Treatments       02/17/17 1108          Manual Rx 1    Manual Rx 1 Location Right hip   -KH      Manual Rx 1 Type Gentle inferior mobs/Scar massage  -KH      Manual Rx 1 Duration 10  -KH        User Key  (r) = Recorded By, (t) = Taken By, (c) = Cosigned By    Initials Name Provider Type    CECIL BallCRYSTAL Physical Therapy Assistant                PT OP Goals       02/17/17 1108 02/15/17 1352 02/13/17 1258    PT Short Term Goals    STG Date to Achieve 02/20/17  -KH 02/20/17  -KH 02/20/17  -KH    STG 1 Independent in HEP   -KH Independent in HEP   -KH Independent in HEP   -KH    STG 1 Progress Progressing  -KH Progressing  -KH Progressing  -KH    STG 2 SLR x10 no lag  -KH SLR x10 no lag  -KH SLR x10 no lag  -KH    STG 2 Progress Not Met  -KH Not Met  -KH Not Met  -KH    STG 2 Progress Comments Still lagging with quad strength even with LAQ R.    -KH      STG 3 Sit to stand no UE assist  -KH Sit to stand no UE assist  -KH Sit to stand no UE assist  -KH    STG 3 Progress Progressing  -KH Not Met  -KH Not Met  -KH    Long Term Goals    LTG Date to Achieve 03/13/17  -KH 03/13/17  -KH 03/13/17  -KH    LTG 1 Independent in final HEP   -KH Independent in final HEP   -KH Independent in final HEP   -KH    LTG 1 Progress Not Met  -KH Not Met  -KH Not Met  -KH    LTG 2 SLS RLE 5 seconds level ground with eyes open  -KH SLS RLE 5 seconds level ground with eyes open  -KH SLS RLE 5 seconds level ground with eyes open  -KH    LTG 2 Progress Not Met  -KH Not Met  -KH Not Met  -KH    LTG 3 Hip flexion 5/5  -KH Hip flexion 5/5  -KH Hip flexion 5/5  -KH    LTG 3 Progress Not Met  -KH Not Met  -KH Not Met  -KH    LTG 4 Hip abduction 5/5  -KH Hip abduction 5/5  -KH Hip abduction 5/5  -KH    LTG 4 Progress Not Met  -KH Not Met  -KH Not Met  -KH    LTG 5 No increase in pain with 6  minute walk test  -KH No increase in pain with 6 minute walk test  -KH No increase in pain with 6 minute walk test  -KH    LTG 5 Progress Not Met  -KH Not Met  -KH Not Met  -KH    Time Calculation    PT Goal Re-Cert Due Date 02/20/17   8/8, 40%, medicare, MD 4 weeks  -KH 02/20/17   7/7, 40%. medicare, MD 4 weeks  -KH 02/20/17   MD 4 weeks; 6/6. 25%, medicare  -      02/08/17 1100 02/06/17 1105       PT Short Term Goals    STG Date to Achieve 02/20/17  -KH 02/20/17  -KH     STG 1 Independent in HEP   -KH Independent in HEP   -KH     STG 1 Progress Progressing  -KH Progressing  -KH     STG 2 SLR x10 no lag  -KH SLR x10 no lag  -KH     STG 2 Progress Not Met  -KH Not Met  -KH     STG 3 Sit to stand no UE assist  -KH Sit to stand no UE assist  -KH     STG 3 Progress Not Met  -KH Not Met  -KH     Long Term Goals    LTG Date to Achieve 03/13/17  -KH 03/13/17  -KH     LTG 1 Independent in final HEP   -KH Independent in final HEP   -KH     LTG 1 Progress Not Met  -KH Not Met  -KH     LTG 2 SLS RLE 5 seconds level ground with eyes open  -KH SLS RLE 5 seconds level ground with eyes open  -KH     LTG 2 Progress Not Met  -KH Not Met  -KH     LTG 3 Hip flexion 5/5  -KH Hip flexion 5/5  -KH     LTG 3 Progress Not Met  -KH Not Met  -KH     LTG 4 Hip abduction 5/5  -KH Hip abduction 5/5  -KH     LTG 4 Progress Not Met  -KH Not Met  -KH     LTG 5 No increase in pain with 6 minute walk test  -KH No increase in pain with 6 minute walk test  -KH     LTG 5 Progress Not Met  -KH Not Met  -KH     Time Calculation    PT Goal Re-Cert Due Date 02/20/17  -KH 02/20/17  -KH       User Key  (r) = Recorded By, (t) = Taken By, (c) = Cosigned By    Initials Name Provider Type    CECIL Ball PTA Physical Therapy Assistant                Therapy Education       02/17/17 1108    Therapy Education    Given HEP   Scsar massage and standing ther ex  -KH    Program New  -    How Provided Verbal;Demonstration  -KH    Provided to Patient  -KH     Level of Understanding Demonstrated  -CECIL      02/13/17 1258    Therapy Education    Given HEP   Gait with SPC vs Rollator  -KH    Program New  -KH    How Provided Verbal;Demonstration  -KH    Provided to Patient  -CECIL    Level of Understanding Demonstrated  -CECIL      User Key  (r) = Recorded By, (t) = Taken By, (c) = Cosigned By    Initials Name Provider Type    CECIL Ball PTA Physical Therapy Assistant                Time Calculation:   Start Time: 1108  Stop Time: 1151  Time Calculation (min): 43 min  Total Timed Code Minutes- PT: 43 minute(s)    Therapy Charges for Today     Code Description Service Date Service Provider Modifiers Qty    [id number] HC PT THER PROC EA 15 MIN 2/17/2017 Sheree Ball PTA GP 2    [id number] HC PT MANUAL THERAPY EA 15 MIN 2/17/2017 Sheree Ball PTA GP 1                    Sheree Ball PTA  2/17/2017

## 2017-02-20 ENCOUNTER — OFFICE VISIT (OUTPATIENT)
Dept: FAMILY MEDICINE CLINIC | Facility: CLINIC | Age: 67
End: 2017-02-20

## 2017-02-20 ENCOUNTER — HOSPITAL ENCOUNTER (OUTPATIENT)
Dept: PHYSICAL THERAPY | Facility: HOSPITAL | Age: 67
Setting detail: THERAPIES SERIES
Discharge: HOME | End: 2017-02-20

## 2017-02-20 ENCOUNTER — APPOINTMENT (OUTPATIENT)
Dept: PHYSICAL THERAPY | Facility: HOSPITAL | Age: 67
End: 2017-02-20

## 2017-02-20 ENCOUNTER — APPOINTMENT (OUTPATIENT)
Dept: LAB | Facility: HOSPITAL | Age: 67
End: 2017-02-20

## 2017-02-20 VITALS
HEIGHT: 63 IN | BODY MASS INDEX: 20.18 KG/M2 | DIASTOLIC BLOOD PRESSURE: 64 MMHG | WEIGHT: 113.9 LBS | TEMPERATURE: 101.7 F | SYSTOLIC BLOOD PRESSURE: 120 MMHG

## 2017-02-20 DIAGNOSIS — Z96.641 HISTORY OF RIGHT HIP REPLACEMENT: ICD-10-CM

## 2017-02-20 DIAGNOSIS — M25.551 RIGHT HIP PAIN: ICD-10-CM

## 2017-02-20 DIAGNOSIS — N30.00 ACUTE CYSTITIS WITHOUT HEMATURIA: ICD-10-CM

## 2017-02-20 DIAGNOSIS — R50.81 FEVER IN OTHER DISEASES: Primary | ICD-10-CM

## 2017-02-20 DIAGNOSIS — Z96.641 STATUS POST TOTAL REPLACEMENT OF RIGHT HIP: Primary | ICD-10-CM

## 2017-02-20 LAB
BACTERIA, POC: ABNORMAL
LEUKOCYTE ESTERASE URINE, POC: ABNORMAL
RBC # UR STRIP: ABNORMAL /UL
RBC CAST, POC: ABNORMAL
WBC CAST, POC: ABNORMAL

## 2017-02-20 PROCEDURE — 97110 THERAPEUTIC EXERCISES: CPT

## 2017-02-20 PROCEDURE — 87086 URINE CULTURE/COLONY COUNT: CPT | Performed by: FAMILY MEDICINE

## 2017-02-20 PROCEDURE — 87186 SC STD MICRODIL/AGAR DIL: CPT | Performed by: FAMILY MEDICINE

## 2017-02-20 PROCEDURE — 81001 URINALYSIS AUTO W/SCOPE: CPT | Performed by: FAMILY MEDICINE

## 2017-02-20 PROCEDURE — 99214 OFFICE O/P EST MOD 30 MIN: CPT | Performed by: FAMILY MEDICINE

## 2017-02-20 PROCEDURE — 87077 CULTURE AEROBIC IDENTIFY: CPT | Performed by: FAMILY MEDICINE

## 2017-02-20 RX ORDER — CIPROFLOXACIN 500 MG/1
500 TABLET, FILM COATED ORAL 2 TIMES DAILY
Qty: 20 TABLET | Refills: 0 | Status: SHIPPED | OUTPATIENT
Start: 2017-02-20 | End: 2017-09-28

## 2017-02-20 NOTE — PROGRESS NOTES
"Subjective   Kalli Lowe is a 67 y.o. female.     History of Present Illness patient is positive for weeks status post right hip replacement.  Was placed on aspirin as an anticoagulant.  Was doing fairly well until developed fever approximately 4 days ago.  Chills and shakes but no respiratory  or GI symptoms.  No unusual leg swelling.  Is on no current medications except when necessary Tylenol.    The following portions of the patient's history were reviewed and updated as appropriate: allergies, current medications, past family history, past medical history, past social history, past surgical history and problem list.    Review of Systems   Constitutional: Positive for fever. Negative for activity change, appetite change, fatigue and unexpected weight change.   HENT: Negative for trouble swallowing and voice change.    Eyes: Negative for redness and visual disturbance.   Respiratory: Negative for cough and wheezing.    Cardiovascular: Negative for chest pain and palpitations.   Gastrointestinal: Negative for abdominal pain, constipation, diarrhea, nausea and vomiting.   Genitourinary: Negative for urgency.   Musculoskeletal: Negative for joint swelling.   Neurological: Negative for syncope and headaches.   Hematological: Negative for adenopathy.   Psychiatric/Behavioral: Negative for sleep disturbance.       Objective   Physical Exam   HENT:   Head: Normocephalic.   Right Ear: External ear normal.   Left Ear: External ear normal.   Nose: Nose normal.   Eyes: Pupils are equal, round, and reactive to light.   Neck: Normal range of motion.   Cardiovascular: Normal rate, regular rhythm, normal heart sounds and intact distal pulses.    No murmur heard.  Pulmonary/Chest: Effort normal and breath sounds normal. No respiratory distress. She has no wheezes. She has no rales.   Abdominal: Soft. Bowel sounds are normal.   Musculoskeletal: She exhibits no edema or deformity.   Noticed leg swelling no Rell\" sign.  " No cord sign   Neurological:   Reflex Scores:       Patellar reflexes are 2+ on the right side and 2+ on the left side.  Skin: Skin is warm and dry.   Psychiatric: She has a normal mood and affect. Her speech is normal and behavior is normal. Thought content normal.     Results for orders placed or performed during the hospital encounter of 01/10/17   Comprehensive Metabolic Panel   Result Value Ref Range    Sodium 140 137 - 145 mmol/L    Potassium 4.4 3.5 - 5.1 mmol/L    Chloride 101 95 - 110 mmol/L    CO2 29 22 - 31 mmol/L    Anion Gap 10.0 5.0 - 15.0 mmol/L    Glucose 88 60 - 100 mg/dl    BUN 21 7 - 21 mg/dl    Creatinine 0.9 0.5 - 1.0 mg/dl    GFR MDRD Non  62 45 - 104 mL/min/1.73 sq.M    GFR MDRD  75 45 - 104 mL/min/1.73 sq.M    Calcium 9.3 8.4 - 10.2 mg/dl    Total Protein 7.1 6.3 - 8.6 gm/dl    Albumin 4.1 3.4 - 4.8 gm/dl    Total Bilirubin 0.7 0.2 - 1.3 mg/dl    Alkaline Phosphatase 96 38 - 126 U/L    AST (SGOT) 34 14 - 36 U/L    ALT (SGPT) 35 9 - 52 U/L   Magnesium   Result Value Ref Range    Magnesium 2.10 1.60 - 2.30 mg/dl   T4, Free   Result Value Ref Range    Free T4 0.96 0.78 - 2.19 ng/dl   TSH   Result Value Ref Range    TSH 1.68 0.46 - 4.68 uIU/ml   Vitamin B12   Result Value Ref Range    Vitamin B-12 374 239 - 931 pg/ml   CBC & Differential   Result Value Ref Range    WBC 8.2 3.2 - 9.8 x1000/uL    RBC 4.27 3.77 - 5.16 santi/mm3    Hemoglobin 13.5 12.0 - 15.5 gm/dl    Hematocrit 39.9 35.0 - 45.0 %    MCV 93.4 80.0 - 98.0 fl    MCH 31.6 26.0 - 34.0 pg    MCHC 33.8 31.4 - 36.0 gm/dl    RDW 13.1 11.5 - 14.5 %    Platelets 173 150 - 450 x1000/mm3    MPV 10.7 8.0 - 12.0 fl    Neutrophil Rel % 72.0 37.0 - 80.0 %    Lymphocyte Rel % 16.8 10.0 - 50.0 %    Monocyte Rel % 8.6 0.0 - 12.0 %    Eosinophil Rel % 2.3 0.0 - 7.0 %    Basophil Rel % 0.2 0.0 - 2.0 %    Immature Granulocyte Rel % 0.10 0.00 - 0.50 %    Neutrophils Absolute 5.86 2.00 - 8.60 x1000/uL    Lymphocytes Absolute  1.37 0.60 - 4.20 x1000/uL    Monocytes Absolute 0.70 0.00 - 0.90 x1000/uL    Eosinophils Absolute 0.19 0.00 - 0.70 x1000/uL    Basophils Absolute 0.02 0.00 - 0.20 x1000/uL    Immature Granulocytes Absolute 0.010 0.005 - 0.022 x1000/uL     Urine pos uti     Assessment/Plan   Problems Addressed this Visit     None      Visit Diagnoses     Fever in other diseases    -  Primary    Relevant Medications    ciprofloxacin (CIPRO) 500 MG tablet    Other Relevant Orders    POC Micro Urine    Urine Culture    History of right hip replacement        Relevant Orders    POC Micro Urine    Urine Culture    Acute cystitis without hematuria        Relevant Medications    ciprofloxacin (CIPRO) 500 MG tablet    Other Relevant Orders    Urine Culture         on  Med fluids  Sx relief.   Contact if fever not gone in    1-2d   Vincent as directed

## 2017-02-20 NOTE — PROGRESS NOTES
Outpatient Physical Therapy Ortho Treatment Note  [facility]     Patient Name: Kalli Lowe  : 1950  MRN: [id number]  Today's Date: 2017      Visit Date: 2017     Subjective Improvement: 40%  MD visit: 3 weeks  Visit Number:   Total Approved:medicare  Recert Date: 17    DX: s/p right RICKY 17      Visit Dx:    ICD-10-CM ICD-9-CM   1. Status post total replacement of right hip Z96.641 V43.64   2. Right hip pain M25.551 719.45       There is no problem list on file for this patient.       Past Medical History   Diagnosis Date   • Benign paroxysmal positional vertigo    • Breast lump      left breast benign on u/s guided mammotone bx      • Contact dermatitis      olecranon area from chair material?     • Cough    • Encounter for long-term (current) use of medications    • GERD (gastroesophageal reflux disease)    • H/O screening mammography    • Hip pain    • History of Holter monitoring 2013     Overall rhythm sinus rhythm with average heart rate of 73 bpm, min 49 , max 111. Frequent isolated with frequent bigeminal cycles, total of 491 bigeminal cycles. No couplets of runs of ventricular tachycardia. Rare isolated PACs, total of 25.   • History of urinary disorder    • Large ovary    • Myalgia    • Osteoarthritis    • Osteopenia    • Otalgia of left ear      resolved spontaneously      • Palpitations    • Postmenopausal state    • Screening for malignant neoplasm of breast       left breast benign mammotone bx      • Screening for osteoporosis    • Vertigo      resolved        Past Surgical History   Procedure Laterality Date   • Breast biopsy  2014     Ultrasound guided left breast mammotome biopsy with 8 gauge needle and also left a clip.   • Colonoscopy  2013     Hemorrhoids found.   • Pap smear  2015     WNL, CARD SENT, DR. CARLSON'S OFFICE   • Injection of medication  2015     PNEUMOC VAC/ADMIN/RCVD 4040F (1)      • Hip surgery Right  01/24/2017     Total hip-lateral approach             PT Ortho       02/20/17 1300    Subjective Comments    Subjective Comments Drove to therapy today for the first time by herself.  Pt reports that she has not done anything since the last time she was in therapy.  Has had a fever on and off all weekend.  Seeing the MD right after therapy today.  Doesn't know how much she can do today.    -    Precautions and Contraindications    Precautions/Limitations hip precautions- right  -    Precautions Total Hip Protocol   -    Contraindications HIp Precautions x6 weeks  -    Posture/Observations    Posture/Observations Comments Rounded shoulder posture  -    Gait Assessment/Treatment    Gait, Comment Gait with SPC mild [place], decrease step length.   -      User Key  (r) = Recorded By, (t) = Taken By, (c) = Cosigned By    Initials Name Provider Type    CECIL Ball PTA Physical Therapy Assistant                            PT Assessment/Plan       02/20/17 1300 02/17/17 1108 02/15/17 1352    PT Assessment    Assessment Comments 3 weeks 6 days post-op.  Decrease treatment secondary to pt not feeling well and possibly running a fever.    - 3 weeks, 3 days post-op Began scar massage and gentle mobes to help allow for pt to have increase rotation in right hip. and to increase scar moblilty.  Decereased standing exercises this date secondary to pt not feeling well.  Worked on core stab and LE without difficulty in seateed position.   - Good balance with airex and cane this date.  Still some weakness with right LE and fatigue but is improving and trusting Right LE with decrease guarding.   -    PT Plan    PT Plan Comments Add SLR FF next.  Recheck scheduled for next week.  Posible decrease to 2x/week  - Add SLR FF next visit.  Monitor response to scar massage and gentle mobs to right hip. continue if benifical.  Continue to work on gait and strength in right hip as able.    - Continue to work with  gait with cane.  Increase WB balance and strength of the right LE.  Airex mini squat and calf raises for balance next.   -KH      User Key  (r) = Recorded By, (t) = Taken By, (c) = Cosigned By    Initials Name Provider Type    CECIL Ball PTA Physical Therapy Assistant                    Exercises       02/20/17 1300          Subjective Comments    Subjective Comments Drove to therapy today for the first time by herself.  Pt reports that she has not done anything since the last time she was in therapy.  Has had a fever on and off all weekend.  Seeing the MD right after therapy today.  Doesn't know how much she can do today.    -KH      Subjective Pain    Able to rate subjective pain? yes  -KH      Pre-Treatment Pain Level 2  -KH      Post-Treatment Pain Level 2  -KH      Exercise 1    Exercise Name 1 Pro ll Level 3  -KH      Time (Minutes) 1 10  -KH      Exercise 2    Exercise Name 2 Standing hamstring stretch  -KH      Reps 2 3  -KH      Time (Seconds) 2 30  -KH      Exercise 3    Exercise Name 3 Incline stretch  -KH      Reps 3 3  -KH      Time (Seconds) 3 30  -KH      Exercise 4    Exercise Name 4 Standing hip flexor stretch  -KH      Reps 4 3  -KH      Time (Seconds) 4 30  -KH        User Key  (r) = Recorded By, (t) = Taken By, (c) = Cosigned By    Initials Name Provider Type    CECIL Ball PTA Physical Therapy Assistant                               PT OP Goals       02/20/17 1300 02/17/17 1108 02/15/17 1352    PT Short Term Goals    STG Date to Achieve 02/20/17  -KH 02/20/17  -KH 02/20/17  -KH    STG 1 Independent in HEP   -KH Independent in HEP   -KH Independent in HEP   -KH    STG 1 Progress Progressing  -KH Progressing  -KH Progressing  -KH    STG 2 SLR x10 no lag  -KH SLR x10 no lag  -KH SLR x10 no lag  -KH    STG 2 Progress Progressing  -KH Not Met  -KH Not Met  -KH    STG 2 Progress Comments  Still lagging with quad strength even with LAQ R.    -KH     STG 3 Sit to stand no UE assist  -KH Sit to  stand no UE assist  -KH Sit to stand no UE assist  -KH    STG 3 Progress Progressing  -KH Progressing  -KH Not Met  -KH    Long Term Goals    LTG Date to Achieve 03/13/17  -KH 03/13/17  -KH 03/13/17  -KH    LTG 1 Independent in final HEP   -KH Independent in final HEP   -KH Independent in final HEP   -KH    LTG 1 Progress Ongoing  -KH Not Met  -KH Not Met  -KH    LTG 2 SLS RLE 5 seconds level ground with eyes open  -KH SLS RLE 5 seconds level ground with eyes open  -KH SLS RLE 5 seconds level ground with eyes open  -KH    LTG 2 Progress Not Met  -KH Not Met  -KH Not Met  -KH    LTG 3 Hip flexion 5/5  -KH Hip flexion 5/5  -KH Hip flexion 5/5  -KH    LTG 3 Progress Progressing  -KH Not Met  -KH Not Met  -KH    LTG 4 Hip abduction 5/5  -KH Hip abduction 5/5  -KH Hip abduction 5/5  -KH    LTG 4 Progress Progressing  -KH Not Met  -KH Not Met  -KH    LTG 5 No increase in pain with 6 minute walk test  -KH No increase in pain with 6 minute walk test  -KH No increase in pain with 6 minute walk test  -KH    LTG 5 Progress Progressing  -KH Not Met  -KH Not Met  -KH    Time Calculation    PT Goal Re-Cert Due Date 02/20/17   9/9 40% medicare, MD-3 weeks  -KH 02/20/17   8/8, 40%, medicare, MD 4 weeks  -KH 02/20/17   7/7, 40%. medicare, MD 4 weeks  -KH      02/13/17 1258 02/08/17 1100       PT Short Term Goals    STG Date to Achieve 02/20/17  -KH 02/20/17  -KH     STG 1 Independent in HEP   -KH Independent in HEP   -KH     STG 1 Progress Progressing  -KH Progressing  -KH     STG 2 SLR x10 no lag  -KH SLR x10 no lag  -KH     STG 2 Progress Not Met  -KH Not Met  -KH     STG 3 Sit to stand no UE assist  -KH Sit to stand no UE assist  -KH     STG 3 Progress Not Met  -KH Not Met  -KH     Long Term Goals    LTG Date to Achieve 03/13/17  -KH 03/13/17  -KH     LTG 1 Independent in final HEP   -KH Independent in final HEP   -KH     LTG 1 Progress Not Met  -KH Not Met  -KH     LTG 2 SLS RLE 5 seconds level ground with eyes open  - SLS RLE  5 seconds level ground with eyes open  -KH     LTG 2 Progress Not Met  -KH Not Met  -KH     LTG 3 Hip flexion 5/5  -KH Hip flexion 5/5  -KH     LTG 3 Progress Not Met  -KH Not Met  -KH     LTG 4 Hip abduction 5/5  -KH Hip abduction 5/5  -KH     LTG 4 Progress Not Met  -KH Not Met  -KH     LTG 5 No increase in pain with 6 minute walk test  -KH No increase in pain with 6 minute walk test  -KH     LTG 5 Progress Not Met  -KH Not Met  -KH     Time Calculation    PT Goal Re-Cert Due Date 02/20/17   MD 4 weeks; 6/6. 25%, medicare  -KH 02/20/17  -KH       User Key  (r) = Recorded By, (t) = Taken By, (c) = Cosigned By    Initials Name Provider Type    CECIL Ball PTA Physical Therapy Assistant                Therapy Education       02/17/17 1108    Therapy Education    Given HEP   Scsar massage and standing ther ex  -KH    Program New  -KH    How Provided Verbal;Demonstration  -KH    Provided to Patient  -KH    Level of Understanding Demonstrated  -      User Key  (r) = Recorded By, (t) = Taken By, (c) = Cosigned By    Initials Name Provider Type    CECIL Ball PTA Physical Therapy Assistant                Time Calculation:   Start Time: 1300  Stop Time: 1330  Time Calculation (min): 30 min  Total Timed Code Minutes- PT: 30 minute(s)    Therapy Charges for Today     Code Description Service Date Service Provider Modifiers Qty    [id number] HC PT THER PROC EA 15 MIN 2/20/2017 Sheree Ball PTA GP 2                    Sheree Ball PTA  2/20/2017

## 2017-02-22 ENCOUNTER — HOSPITAL ENCOUNTER (OUTPATIENT)
Dept: PHYSICAL THERAPY | Facility: HOSPITAL | Age: 67
Setting detail: THERAPIES SERIES
Discharge: HOME | End: 2017-02-22

## 2017-02-22 DIAGNOSIS — Z96.641 STATUS POST TOTAL REPLACEMENT OF RIGHT HIP: Primary | ICD-10-CM

## 2017-02-22 DIAGNOSIS — M25.551 RIGHT HIP PAIN: ICD-10-CM

## 2017-02-22 LAB
BACTERIA SPEC AEROBE CULT: ABNORMAL
BETA LACTAMASE: ABNORMAL

## 2017-02-22 PROCEDURE — 97110 THERAPEUTIC EXERCISES: CPT

## 2017-02-22 NOTE — PROGRESS NOTES
Outpatient Physical Therapy Ortho Treatment Note  [facility]     Patient Name: Kalli Lowe  : 1950  MRN: [id number]  Today's Date: 2017      Visit Date: 2017  Subjective Improvement: 40%  MD visit: 3 weeks  Visit Number: 10/10  Total Approved: medicare  Recert Date: 17    DX: s/p Right RICKY 17      Visit Dx:    ICD-10-CM ICD-9-CM   1. Status post total replacement of right hip Z96.641 V43.64   2. Right hip pain M25.551 719.45       Patient Active Problem List   Diagnosis   • Osteoarthritis   • Osteopenia        Past Medical History   Diagnosis Date   • Benign paroxysmal positional vertigo    • Breast lump      left breast benign on u/s guided mammotone bx      • Contact dermatitis      olecranon area from chair material?     • Cough    • Encounter for long-term (current) use of medications    • GERD (gastroesophageal reflux disease)    • H/O screening mammography    • Hip pain    • History of Holter monitoring 2013     Overall rhythm sinus rhythm with average heart rate of 73 bpm, min 49 , max 111. Frequent isolated with frequent bigeminal cycles, total of 491 bigeminal cycles. No couplets of runs of ventricular tachycardia. Rare isolated PACs, total of 25.   • History of urinary disorder    • Large ovary    • Myalgia    • Osteoarthritis    • Osteopenia    • Otalgia of left ear      resolved spontaneously      • Palpitations    • Postmenopausal state    • Screening for malignant neoplasm of breast       left breast benign mammotone bx      • Screening for osteoporosis    • Vertigo      resolved        Past Surgical History   Procedure Laterality Date   • Breast biopsy  2014     Ultrasound guided left breast mammotome biopsy with 8 gauge needle and also left a clip.   • Colonoscopy  2013     Hemorrhoids found.   • Pap smear  2015     WNL, CARD SENT, DR. CARLSON'S OFFICE   • Injection of medication  2015     PNEUMOC VAC/ADMIN/RCVD 4040F (1)       • Hip surgery Right 01/24/2017     Total hip-lateral approach             PT Ortho       02/22/17 1300    Precautions and Contraindications    Precautions/Limitations hip precautions- right  -KH    Precautions Total Hip Protocol   -    Contraindications Hip Precautions x6 weeks   -    Subjective Pain    Post-Treatment Pain Level 2  -    Posture/Observations    Posture/Observations Comments Rounded shoulder posture  -    Gait Assessment/Treatment    Gait, Comment Gait w/ SPC mild trendelenburg gait. Cadance improved with gait.   -      02/20/17 1300    Subjective Comments    Subjective Comments Drove to therapy today for the first time by herself.  Pt reports that she has not done anything since the last time she was in therapy.  Has had a fever on and off all weekend.  Seeing the MD right after therapy today.  Doesn't know how much she can do today.    -    Precautions and Contraindications    Precautions/Limitations hip precautions- right  -    Precautions Total Hip Protocol   -    Contraindications HIp Precautions x6 weeks  -    Posture/Observations    Posture/Observations Comments Rounded shoulder posture  -    Gait Assessment/Treatment    Gait, Comment Gait with SPC mild trendlenburg, decrease step length.   -      User Key  (r) = Recorded By, (t) = Taken By, (c) = Cosigned By    Initials Name Provider Type    CECIL Ball PTA Physical Therapy Assistant                            PT Assessment/Plan       02/22/17 1300 02/20/17 1300 02/17/17 1108    PT Assessment    Assessment Comments 4 weeks post op right RICKY.  Able to tolerate more WB exercises with treatment today.  Continues with hip abd weakness.  Step ups tolerable but needed assist to kep knee/hip in neutral postion.  SLR FF good strength today.  -CECIL 3 weeks 6 days post-op.  Decrease treatment secondary to pt not feeling well and possibly running a fever.    - 3 weeks, 3 days post-op Began scar massage and gentle mobes to help  allow for pt to have increase rotation in right hip. and to increase scar moblilty.  Decereased standing exercises this date secondary to pt not feeling well.  Worked on core stab and LE without difficulty in seateed position.   -KH    PT Plan    PT Plan Comments Ramp next time, gait in //bars without AD, Recheck with PT next week.  Monitor gait. Add hip hikes next.   - Add SLR FF next.  Recheck scheduled for next week.  Posible decrease to 2x/week  - Add SLR FF next visit.  Monitor response to scar massage and gentle mobs to right hip. continue if benifical.  Continue to work on gait and strength in right hip as able.    -      User Key  (r) = Recorded By, (t) = Taken By, (c) = Cosigned By    Initials Name Provider Type    CECIL Ball PTA Physical Therapy Assistant                    Exercises       02/22/17 1300          Subjective Comments    Subjective Comments Pt concerned about right hip wanted to turn inward. Hip not feeling to bad today. Pt reports that she went to the MD monday and found out that she had a UTI.  Reports that after being on antiboditc for three days feeling much better.   -KH      Subjective Pain    Able to rate subjective pain? yes  -KH      Pre-Treatment Pain Level 2  -KH      Post-Treatment Pain Level 2  -KH      Exercise 1    Exercise Name 1 Pro ll Level 3   -KH      Time (Minutes) 1 10  -KH      Exercise 2    Exercise Name 2 Incline stretch  -KH      Reps 2 3  -KH      Time (Seconds) 2 30  -KH      Exercise 3    Exercise Name 3 Standing Ham Stretch  -KH      Reps 3 3  -KH      Time (Seconds) 3 30  -KH      Exercise 4    Exercise Name 4 step ups FWD 6 inch  -KH      Sets 4 2  -KH      Reps 4 10  -KH      Exercise 5    Exercise Name 5 Step UPs lat 4 inch  -KH      Sets 5 2  -KH      Reps 5 10  -KH      Exercise 6    Exercise Name 6 Standing Hip 3 way with Tband at calf  -KH      Resistance 6 Yellow  -KH      Sets 6 2  -KH      Reps 6 10  -KH      Exercise 7    Exercise Name 7  seated hip ABD with Tband  -KH      Resistance 7 Yellow  -KH      Sets 7 2  -KH      Reps 7 10  -KH      Exercise 8    Exercise Name 8 Bridge with ABD  -KH      Resistance 8 Yellow  -KH      Sets 8 2  -KH      Reps 8 10  -KH      Exercise 9    Exercise Name 9 SLR FF  -KH      Sets 9 2  -KH      Reps 9 10  -KH        User Key  (r) = Recorded By, (t) = Taken By, (c) = Cosigned By    Initials Name Provider Type    CECIL Ball, PTA Physical Therapy Assistant                               PT OP Goals       02/22/17 1300 02/20/17 1300 02/17/17 1108    PT Short Term Goals    STG Date to Achieve 02/20/17  -KH 02/20/17  -KH 02/20/17  -KH    STG 1 Independent in HEP   -KH Independent in HEP   -KH Independent in HEP   -KH    STG 1 Progress Progressing  -KH Progressing  -KH Progressing  -KH    STG 2 SLR x10 no lag  -KH SLR x10 no lag  -KH SLR x10 no lag  -KH    STG 2 Progress Met  -KH Progressing  -KH Not Met  -KH    STG 2 Progress Comments   Still lagging with quad strength even with LAQ R.    -KH    STG 3 Sit to stand no UE assist  -KH Sit to stand no UE assist  -KH Sit to stand no UE assist  -KH    STG 3 Progress Progressing  -KH Progressing  -KH Progressing  -KH    Long Term Goals    LTG Date to Achieve 03/13/17  -KH 03/13/17  -KH 03/13/17  -KH    LTG 1 Independent in final HEP   -KH Independent in final HEP   -KH Independent in final HEP   -KH    LTG 1 Progress Ongoing  -KH Ongoing  -KH Not Met  -KH    LTG 2 SLS RLE 5 seconds level ground with eyes open  -KH SLS RLE 5 seconds level ground with eyes open  -KH SLS RLE 5 seconds level ground with eyes open  -KH    LTG 2 Progress Not Met  -KH Not Met  -KH Not Met  -KH    LTG 3 Hip flexion 5/5  -KH Hip flexion 5/5  -KH Hip flexion 5/5  -KH    LTG 3 Progress Progressing  -KH Progressing  -KH Not Met  -KH    LTG 4 Hip abduction 5/5  -KH Hip abduction 5/5  -KH Hip abduction 5/5  -KH    LTG 4 Progress Progressing  -KH Progressing  -KH Not Met  -KH    LTG 5 No increase in pain  with 6 minute walk test  -KH No increase in pain with 6 minute walk test  -KH No increase in pain with 6 minute walk test  -KH    LTG 5 Progress Progressing  -KH Progressing  -KH Not Met  -KH    Time Calculation    PT Goal Re-Cert Due Date 02/20/17   10/10, 40%, medicare, 3 weeks-MD  -CECIL 02/20/17   9/9 40% medicare, MD-3 weeks  -KH 02/20/17   8/8, 40%, medicare, MD 4 weeks  -KH      02/15/17 1352 02/13/17 1258       PT Short Term Goals    STG Date to Achieve 02/20/17  -KH 02/20/17  -KH     STG 1 Independent in HEP   -KH Independent in HEP   -KH     STG 1 Progress Progressing  -KH Progressing  -KH     STG 2 SLR x10 no lag  -KH SLR x10 no lag  -KH     STG 2 Progress Not Met  -KH Not Met  -KH     STG 3 Sit to stand no UE assist  -KH Sit to stand no UE assist  -KH     STG 3 Progress Not Met  -KH Not Met  -KH     Long Term Goals    LTG Date to Achieve 03/13/17  -KH 03/13/17  -KH     LTG 1 Independent in final HEP   -KH Independent in final HEP   -KH     LTG 1 Progress Not Met  -KH Not Met  -KH     LTG 2 SLS RLE 5 seconds level ground with eyes open  -KH SLS RLE 5 seconds level ground with eyes open  -KH     LTG 2 Progress Not Met  -KH Not Met  -KH     LTG 3 Hip flexion 5/5  -KH Hip flexion 5/5  -KH     LTG 3 Progress Not Met  -KH Not Met  -KH     LTG 4 Hip abduction 5/5  -KH Hip abduction 5/5  -KH     LTG 4 Progress Not Met  -KH Not Met  -KH     LTG 5 No increase in pain with 6 minute walk test  -KH No increase in pain with 6 minute walk test  -KH     LTG 5 Progress Not Met  -KH Not Met  -KH     Time Calculation    PT Goal Re-Cert Due Date 02/20/17   7/7, 40%. medicare, MD 4 weeks  -KH 02/20/17   MD 4 weeks; 6/6. 25%, medicare  -CECIL       User Key  (r) = Recorded By, (t) = Taken By, (c) = Cosigned By    Initials Name Provider Type    CECIL Ball PTA Physical Therapy Assistant                Therapy Education       02/22/17 1300    Therapy Education    Given HEP   Bridge w/ Abd, seat hip ABD with band, standing 3way w/  band  -KH    Program New  -KH    How Provided Demonstration  -KH    Provided to Patient  -KH    Level of Understanding Teach back education performed;Demonstrated  -KH      02/17/17 1108    Therapy Education    Given HEP   Scsar massage and standing ther ex  -KH    Program New  -CECIL    How Provided Verbal;Demonstration  -KH    Provided to Patient  -KH    Level of Understanding Demonstrated  -KH      User Key  (r) = Recorded By, (t) = Taken By, (c) = Cosigned By    Initials Name Provider Type    CECIL Ball PTA Physical Therapy Assistant                Time Calculation:   Start Time: 1300  Stop Time: 1345  Time Calculation (min): 45 min  Total Timed Code Minutes- PT: 45 minute(s)    Therapy Charges for Today     Code Description Service Date Service Provider Modifiers Qty    27119400606 HC PT THER PROC EA 15 MIN 2/22/2017 Sheree Ball PTA GP 3                    Sheree Ball PTA  2/22/2017

## 2017-02-24 ENCOUNTER — APPOINTMENT (OUTPATIENT)
Dept: PHYSICAL THERAPY | Facility: HOSPITAL | Age: 67
End: 2017-02-24

## 2017-02-24 ENCOUNTER — HOSPITAL ENCOUNTER (OUTPATIENT)
Dept: PHYSICAL THERAPY | Facility: HOSPITAL | Age: 67
Setting detail: THERAPIES SERIES
Discharge: HOME | End: 2017-02-24

## 2017-02-24 DIAGNOSIS — Z96.641 STATUS POST TOTAL REPLACEMENT OF RIGHT HIP: Primary | ICD-10-CM

## 2017-02-24 DIAGNOSIS — M25.551 RIGHT HIP PAIN: ICD-10-CM

## 2017-02-24 PROCEDURE — 97116 GAIT TRAINING THERAPY: CPT

## 2017-02-24 PROCEDURE — 97110 THERAPEUTIC EXERCISES: CPT

## 2017-02-24 NOTE — PROGRESS NOTES
Outpatient Physical Therapy Ortho Treatment Note  [facility]     Patient Name: Kalli Lowe  : 1950  MRN: [id number]  Today's Date: 2017      Visit Date: 2017    Subjective Improvement: 40%  MD visit: 3-3-17  Visit Number:   Total Approved:medicare  Recert Date: 17    Visit Dx:    ICD-10-CM ICD-9-CM   1. Status post total replacement of right hip Z96.641 V43.64   2. Right hip pain M25.551 719.45       Patient Active Problem List   Diagnosis   • Osteoarthritis   • Osteopenia        Past Medical History   Diagnosis Date   • Benign paroxysmal positional vertigo    • Breast lump      left breast benign on u/s guided mammotone bx      • Contact dermatitis      olecranon area from chair material?     • Cough    • Encounter for long-term (current) use of medications    • GERD (gastroesophageal reflux disease)    • H/O screening mammography    • Hip pain    • History of Holter monitoring 2013     Overall rhythm sinus rhythm with average heart rate of 73 bpm, min 49 , max 111. Frequent isolated with frequent bigeminal cycles, total of 491 bigeminal cycles. No couplets of runs of ventricular tachycardia. Rare isolated PACs, total of 25.   • History of urinary disorder    • Large ovary    • Myalgia    • Osteoarthritis    • Osteopenia    • Otalgia of left ear      resolved spontaneously      • Palpitations    • Postmenopausal state    • Screening for malignant neoplasm of breast       left breast benign mammotone bx      • Screening for osteoporosis    • Vertigo      resolved        Past Surgical History   Procedure Laterality Date   • Breast biopsy  2014     Ultrasound guided left breast mammotome biopsy with 8 gauge needle and also left a clip.   • Colonoscopy  2013     Hemorrhoids found.   • Pap smear  2015     WNL, CARD SENT, DR. CARLSON'S OFFICE   • Injection of medication  2015     PNEUMOC VAC/ADMIN/RCVD 4040F (1)      • Hip surgery Right  01/24/2017     Total hip-lateral approach             PT Ortho       02/24/17 1226    Precautions and Contraindications    Precautions/Limitations hip precautions- right  -KH    Precautions Total precautions  -    Contraindications Hip Precaution x6  -    Posture/Observations    Posture/Observations Comments Gait with SPC.  NO cane used through out treatment. Rounded shoulders  -KH      02/22/17 1300    Precautions and Contraindications    Precautions/Limitations hip precautions- right  -KH    Precautions Total Hip Protocol   -    Contraindications Hip Precautions x6 weeks   -    Subjective Pain    Post-Treatment Pain Level 2  -    Posture/Observations    Posture/Observations Comments Rounded shoulder posture  -    Gait Assessment/Treatment    Gait, Comment Gait w/ SPC mild trendelenburg gait. Cadance improved with gait.   -      User Key  (r) = Recorded By, (t) = Taken By, (c) = Cosigned By    Initials Name Provider Type    CECIL Ball PTA Physical Therapy Assistant                            PT Assessment/Plan       02/24/17 1226       PT Assessment    Assessment Comments pt did well with gait without AD.  VC to keep hip from dropping at times but able to do it.  HIp hikes challengening to pt.   -     PT Plan    PT Plan Comments Recheck soon with PT.  MD next friday.   -       User Key  (r) = Recorded By, (t) = Taken By, (c) = Cosigned By    Initials Name Provider Type    CECIL Ball PTA Physical Therapy Assistant                    Exercises       02/24/17 1226          Subjective Comments    Subjective Comments Still concern about right leg turning in. Pt reports that muscles in the right quad are sore and doing more walking without cane.   -      Subjective Pain    Able to rate subjective pain? yes  -      Pre-Treatment Pain Level 2  -KH      Post-Treatment Pain Level 2  -KH      Exercise 1    Exercise Name 1 Pro ll Level 3.5  -KH      Time (Minutes) 1 10  -KH      Exercise 2     Exercise Name 2 gait without AD SBA and VC  -KH      Time (Seconds) 2 100 ft x1  -KH      Exercise 3    Exercise Name 3 hip hikes right  -KH      Sets 3 2  -KH      Reps 3 10  -KH      Exercise 4    Exercise Name 4 step ups fwd 4 inch  -KH      Sets 4 2  -KH      Reps 4 10  -KH      Exercise 5    Exercise Name 5 lateral step ups  -KH      Sets 5 2  -KH      Reps 5 10  -KH      Exercise 6    Exercise Name 6 Ramp up fwd dwon fwd  -KH      Sets 6 5  -KH      Exercise 7    Exercise Name 7 Ramp up bkd, down fwd  -KH      Sets 7 5  -KH      Exercise 8    Exercise Name 8 gait without AD   -KH      Time (Seconds) 8 360 feet  -KH        User Key  (r) = Recorded By, (t) = Taken By, (c) = Cosigned By    Initials Name Provider Type    CECIL Ball PTA Physical Therapy Assistant                               PT OP Goals       02/24/17 1226       PT Short Term Goals    STG Date to Achieve 02/20/17  -     STG 1 Independent in HEP   -KH     STG 1 Progress Progressing  -     STG 2 SLR x10 no lag  -KH     STG 2 Progress Met  -KH     STG 3 Sit to stand no UE assist  -     STG 3 Progress Progressing  -KH     Long Term Goals    LTG Date to Achieve 03/13/17  -KH     LTG 1 Independent in final HEP   -KH     LTG 1 Progress Ongoing  -KH     LTG 2 SLS RLE 5 seconds level ground with eyes open  -     LTG 2 Progress Not Met  -KH     LTG 3 Hip flexion 5/5  -KH     LTG 3 Progress Progressing  -KH     LTG 4 Hip abduction 5/5  -KH     LTG 4 Progress Progressing  -KH     LTG 5 No increase in pain with 6 minute walk test  -     LTG 5 Progress Progressing  -KH     Time Calculation    PT Goal Re-Cert Due Date 02/20/17 11/11, 40%, medicare, MD 3-3-17  -KH       User Key  (r) = Recorded By, (t) = Taken By, (c) = Cosigned By    Initials Name Provider Type    CECIL Ball PTA Physical Therapy Assistant                    Time Calculation:   Start Time: 1226  Stop Time: 1306  Time Calculation (min): 40 min  Total Timed Code Minutes- PT: 40  minute(s)    Therapy Charges for Today     Code Description Service Date Service Provider Modifiers Qty    79261441197 HC GAIT TRAINING EA 15 MIN 2/24/2017 Sheree Ball PTA GP 1    08473773087 HC PT THER PROC EA 15 MIN 2/24/2017 Sheree Ball PTA GP 2                    Sheree Ball PTA  2/24/2017    Added gait assessment to PT ortho

## 2017-02-27 ENCOUNTER — HOSPITAL ENCOUNTER (OUTPATIENT)
Dept: PHYSICAL THERAPY | Facility: HOSPITAL | Age: 67
Setting detail: THERAPIES SERIES
Discharge: HOME | End: 2017-02-27

## 2017-02-27 DIAGNOSIS — Z96.641 STATUS POST TOTAL REPLACEMENT OF RIGHT HIP: Primary | ICD-10-CM

## 2017-02-27 PROCEDURE — G8979 MOBILITY GOAL STATUS: HCPCS | Performed by: PHYSICAL THERAPIST

## 2017-02-27 PROCEDURE — G8978 MOBILITY CURRENT STATUS: HCPCS | Performed by: PHYSICAL THERAPIST

## 2017-02-27 PROCEDURE — 97110 THERAPEUTIC EXERCISES: CPT | Performed by: PHYSICAL THERAPIST

## 2017-02-27 NOTE — PROGRESS NOTES
Outpatient Physical Therapy Ortho Progress Note  [facility]     Patient Name: Kalli Lowe  : 1950  MRN: [id number]  Today's Date: 2017      Visit Date: 2017  Attendance:   Subjective % Improvement: 70%  Recert Date: 3/20/2017  MD appointment: 3/3/2017    Visit Dx:    ICD-10-CM ICD-9-CM   1. Status post total replacement of right hip Z96.641 V43.64       Patient Active Problem List   Diagnosis   • Osteoarthritis   • Osteopenia        Past Medical History   Diagnosis Date   • Benign paroxysmal positional vertigo    • Breast lump      left breast benign on u/s guided mammotone bx      • Contact dermatitis      olecranon area from chair material?     • Cough    • Encounter for long-term (current) use of medications    • GERD (gastroesophageal reflux disease)    • H/O screening mammography    • Hip pain    • History of Holter monitoring 2013     Overall rhythm sinus rhythm with average heart rate of 73 bpm, min 49 , max 111. Frequent isolated with frequent bigeminal cycles, total of 491 bigeminal cycles. No couplets of runs of ventricular tachycardia. Rare isolated PACs, total of 25.   • History of urinary disorder    • Large ovary    • Myalgia    • Osteoarthritis    • Osteopenia    • Otalgia of left ear      resolved spontaneously      • Palpitations    • Postmenopausal state    • Screening for malignant neoplasm of breast       left breast benign mammotone bx      • Screening for osteoporosis    • Vertigo      resolved        Past Surgical History   Procedure Laterality Date   • Breast biopsy  2014     Ultrasound guided left breast mammotome biopsy with 8 gauge needle and also left a clip.   • Colonoscopy  2013     Hemorrhoids found.   • Pap smear  2015     WNL, CARD SENT, DR. CARLSON'S OFFICE   • Injection of medication  2015     PNEUMOC VAC/ADMIN/RCVD 4040F (1)      • Hip surgery Right 2017     Total hip-lateral approach             PT  Ortho       02/27/17 1110    Subjective Comments    Subjective Comments Reports having UTI and been taking medication for that. Unable to ly on right hip to sleep and continues to follow hip precautions. Notices pain mostly when putting weight through hip joint.   -BB    Precautions and Contraindications    Precautions/Limitations hip precautions- right  -BB    Precautions Total hip precautions  -BB    Contraindications Hip Precaution x6  -BB    Subjective Pain    Able to rate subjective pain? yes  -BB    Pre-Treatment Pain Level 1  -BB    Post-Treatment Pain Level 1  -BB    Posture/Observations    Posture/Observations Comments Patient ambulates into clinic with SC. Pt is able to ambulate without assistive device but requires frequent cues for improved mechanics and is able to illeminate antalgics with cues. Pt has valgus collapse with sit to stand able to be corrected with cues.   -BB    Sensation    Sensation WNL? WNL  -BB    Sensory Screen for Light Touch- Lower Quarter Clearing    L2 (anterior mid thigh) Intact  -BB    L3 (distal anterior thigh) Intact  -BB    L4 (medial lower leg/foot) Intact  -BB    L5 (lateral lower leg/great toe) Intact  -BB    Myotomal Screen- Lower Quarter Clearing    Hip flexion (L2) 3 (Fair)   Able to complete SLR on right.   -BB    Knee extension (L3) WNL  -BB    Knee flexion (S2) WNL  -BB    Special Tests/Palpation    Special Tests/Palpation --   Slight ttp at incision. No other ttp noted.   -BB    Right Hip    Flexion AROM other (see comments)   90 as seen in sitting   -BB    ABduction AROM other (see comments)   neutral  -BB    ADduction AROM other (see comments)   neutral  -BB    Internal Rotation AROM other (see comments)   Deferred  -BB    External Rotation AROM other (see comments)   30 as seen by pt asking if motion would improve. No pain  -BB    Right Knee    Extension/Flexion AROM within functional limits  -BB    Right Hip    Hip Flexion Gross Movement (3/5) fair  -BB    Hip  ABduction Gross Movement (4-/5) good minus   sitting   -BB    Hip ADduction Gross Movement (4-/5) good minus   sitting  -BB    Pathomechanics    Lower Extremity Pathomechanics Trendelenburg with midstance;Antalgic with midstance   Corrects with cues  -BB    Balance Skills Training    SLS Right: 10 seconds with multiple attempts. Left WNL  -BB    Gait Assessment/Treatment    Gait, Comment Able to ambulate without AD greater than 360 feet but requires frequent cues for antalgics which is able to be corrected.   -BB      User Key  (r) = Recorded By, (t) = Taken By, (c) = Cosigned By    Initials Name Provider Type    ANA Alcantara, PT Physical Therapist                            PT Assessment/Plan       02/27/17 1110       PT Assessment    Functional Limitations Decreased safety during functional activities;Impaired gait;Impaired locomotion;Performance in leisure activities;Performance in self-care ADL  -BB     Impairments Balance;Endurance;Gait;Impaired flexibility;Impaired muscle endurance;Joint mobility;Locomotion;Motor function;Muscle strength;Pain;Range of motion;Other (comment)  -BB     Assessment Comments Patient has progressed well meeting all STGs and progressing towards LTGS. Pt continues to have antalgics noted that are able to be corrected in gait with verbal cues. Pt has knee valgus with sit to stand and other dynamic tasks that is able to be corrected with VCs. Patient continues to lack functional strength and endurance of hip muscles limiting gait without antalgics at this time.   -BB     Please refer to paper survey for additional self-reported information Yes  -BB     Rehab Potential Good  -BB     Patient/caregiver participated in establishment of treatment plan and goals Yes  -BB     Patient would benefit from skilled therapy intervention Yes  -BB     PT Plan    PT Frequency 2x/week;3x/week   Reduce to 2x week starting 3-6-17  -BB     Predicted Duration of Therapy Intervention (days/wks) 3 weeks   "-BB     Planned CPT's? PT RE-EVAL: 31553;PT THER PROC EA 15 MIN: 49702;PT MANUAL THERAPY EA 15 MIN: 38655;PT GAIT TRAINING EA 15 MIN: 07742;PT THER SUPP EA 15 MIN;PT ELECTRICAL STIM UNATTEND: ;PT ELECTRICAL STIM ATTD EA 15 MIN: 35319;PT ULTRASOUND EA 15 MIN: 67864;PT NEUROMUSC RE-EDUCATION EA 15 MIN: 56319  -BB     Physical Therapy Interventions (Optional Details) balance training;bed mobility training;gait training;home exercise program;manual therapy techniques;modalities;ROM (Range of Motion);stretching;strengthening;stair training;transfer training  -BB     PT Plan Comments MD note this week for appt on 3-3-17. Progress gait without AD and hip strength.   -BB       User Key  (r) = Recorded By, (t) = Taken By, (c) = Cosigned By    Initials Name Provider Type    BB Alma Alcantara, PT Physical Therapist                    Exercises       02/27/17 1110          Subjective Comments    Subjective Comments Reports having UTI and been taking medication for that. Unable to ly on right hip to sleep and continues to follow hip precautions. Notices pain mostly when putting weight through hip joint.   -BB      Subjective Pain    Able to rate subjective pain? yes  -BB      Pre-Treatment Pain Level 1  -BB      Post-Treatment Pain Level 1  -BB      Exercise 1    Exercise Name 1 Pro ll Level 3.5  -BB      Time (Minutes) 1 10  -BB      Exercise 2    Exercise Name 2 Incline stretch baby bear  -BB      Sets 2 3  -BB      Time (Seconds) 2 30\"  -BB      Exercise 3    Exercise Name 3 Standing HS stretch  -BB      Sets 3 3  -BB      Time (Seconds) 3 30\"  -BB      Exercise 4    Exercise Name 4 Sit to stand no UE assist  -BB      Cueing 4 Verbal   to avoid right knee valgus  -BB      Sets 4 1  -BB      Reps 4 10  -BB      Exercise 5    Exercise Name 5 Right SLR   -BB      Sets 5 1  -BB      Reps 5 10  -BB      Exercise 6    Exercise Name 6 Bilateral SLS  -BB      Time (Minutes) 6 1 minute total  -BB      Exercise 7    Exercise Name 7 " "Gait witout AD 360feet   -BB      Sets 7 2  -BB      Exercise 8    Exercise Name 8 Eccentric step down 4\"  -BB      Sets 8 1  -BB      Reps 8 20  -BB        User Key  (r) = Recorded By, (t) = Taken By, (c) = Cosigned By    Initials Name Provider Type    ANA Alcantara PT Physical Therapist                               PT OP Goals       02/27/17 1443 02/27/17 1110    PT Short Term Goals    STG Date to Achieve  02/20/17  -BB    STG 1  Independent in HEP   -BB    STG 1 Progress  Met  -BB    STG 2  SLR x10 no lag  -BB    STG 2 Progress  Met  -BB    STG 3  Sit to stand no UE assist  -BB    STG 3 Progress  Met  -BB    Long Term Goals    LTG Date to Achieve  03/13/17  -BB    LTG 1  Independent in final HEP   -BB    LTG 1 Progress  Ongoing  -BB    LTG 2  SLS RLE 5 seconds level ground with eyes open  -BB    LTG 2 Progress  Partially Met  -BB    LTG 2 Progress Comments  Requires multiple attempts and cues for trendelenburg  -BB    LTG 3  Hip flexion 5/5  -BB    LTG 3 Progress  Progressing;Not Met  -BB    LTG 4  Hip abduction 5/5  -BB    LTG 4 Progress  Progressing;Not Met  -BB    LTG 5  No increase in pain with 6 minute walk test  -BB    LTG 5 Progress  Progressing;Not Met  -BB    Time Calculation    PT Goal Re-Cert Due Date 03/20/17  -BB 03/20/17  -BB      User Key  (r) = Recorded By, (t) = Taken By, (c) = Cosigned By    Initials Name Provider Type    ANA Alcantara PT Physical Therapist                Therapy Education       02/27/17 1110          Therapy Education    Given Mobility training;HEP  -BB      Program Reinforced  -BB      How Provided Verbal  -BB      Provided to Patient  -BB      Level of Understanding Verbalized  -BB        User Key  (r) = Recorded By, (t) = Taken By, (c) = Cosigned By    Initials Name Provider Type    ANA Alcantara PT Physical Therapist                Outcome Measures       02/27/17 1110          Lower Extremity Functional Index    Any of your usual work, housework or school " activities 2  -BB      Your usual hobbies, recreational or sporting activities 2  -BB      Getting into or out of the bath 1  -BB      Walking between rooms 3  -BB      Putting on your shoes or socks 2  -BB      Squatting 1  -BB      Lifting an object, like a bag of groceries from the floor 1  -BB      Performing light activities around your home 2  -BB      Performing heavy activities around your home 1  -BB      Getting into or out of a car 3  -BB      Walking 2 blocks 1  -BB      Walking a mile 0  -BB      Going up or down 10 stairs (about 1 flight of stairs) 3  -BB      Standing for 1 hour 2  -BB      Sitting for 1 hour 2  -BB      Running on even ground 0  -BB      Running on uneven ground 0  -BB      Making sharp turns while running fast 0  -BB      Hopping 0  -BB      Rolling over in bed 3  -BB      Total 29  -BB      Functional Assessment    Outcome Measure Options Lower Extremity Functional Scale (LEFS)  -BB        User Key  (r) = Recorded By, (t) = Taken By, (c) = Cosigned By    Initials Name Provider Type    BB Alma Alcantara PT Physical Therapist            Time Calculation:   Start Time: 1108  Stop Time: 1156  Time Calculation (min): 48 min  Total Timed Code Minutes- PT: 39 minute(s)    Therapy Charges for Today     Code Description Service Date Service Provider Modifiers Qty    73450344562 HC PT MOBILITY CURRENT 2/27/2017 Alma Alcantara, PT GP, CJ 1    84007843422 HC PT MOBILITY PROJECTED 2/27/2017 Alma Alcantara, PT GP, CI 1    24044378813 HC PT THER SUPP EA 15 MIN 2/27/2017 Alma Alcantara PT GP 1    18696113404 HC PT THER PROC EA 15 MIN 2/27/2017 Alma Alcantara PT GP 3          PT G-Codes  PT Professional Judgement Used?: Yes  Outcome Measure Options: Lower Extremity Functional Scale (LEFS)  Score: 29/80  Functional Limitation: Mobility: Walking and moving around  Mobility: Walking and Moving Around Current Status (): At least 20 percent but less than 40 percent impaired, limited or  restricted  Mobility: Walking and Moving Around Goal Status (): At least 1 percent but less than 20 percent impaired, limited or restricted         Alma Alcantara, PT  2/27/2017

## 2017-02-28 ENCOUNTER — HOSPITAL ENCOUNTER (OUTPATIENT)
Dept: PHYSICAL THERAPY | Facility: HOSPITAL | Age: 67
Setting detail: THERAPIES SERIES
Discharge: HOME | End: 2017-02-28

## 2017-02-28 DIAGNOSIS — M25.551 RIGHT HIP PAIN: ICD-10-CM

## 2017-02-28 DIAGNOSIS — Z96.641 STATUS POST TOTAL REPLACEMENT OF RIGHT HIP: Primary | ICD-10-CM

## 2017-02-28 PROCEDURE — 97110 THERAPEUTIC EXERCISES: CPT

## 2017-03-03 ENCOUNTER — APPOINTMENT (OUTPATIENT)
Dept: PHYSICAL THERAPY | Facility: HOSPITAL | Age: 67
End: 2017-03-03

## 2017-03-06 ENCOUNTER — HOSPITAL ENCOUNTER (OUTPATIENT)
Dept: PHYSICAL THERAPY | Facility: HOSPITAL | Age: 67
Setting detail: THERAPIES SERIES
Discharge: HOME | End: 2017-03-06

## 2017-03-06 DIAGNOSIS — Z96.641 STATUS POST TOTAL REPLACEMENT OF RIGHT HIP: Primary | ICD-10-CM

## 2017-03-06 PROCEDURE — 97110 THERAPEUTIC EXERCISES: CPT

## 2017-03-06 PROCEDURE — 97116 GAIT TRAINING THERAPY: CPT

## 2017-03-06 NOTE — PROGRESS NOTES
Outpatient Physical Therapy Ortho Treatment Note  [facility]     Patient Name: Kalli Lowe  : 1950  MRN: [id number]  Today's Date: 3/6/2017      Visit Date: 2017   Subjective Improvement: 85%  MD visit: 2017  Visit Number:   Total Approved:medicare  Recert Date: 3/22/17      Visit Dx:    ICD-10-CM ICD-9-CM   1. Status post total replacement of right hip Z96.641 V43.64       Patient Active Problem List   Diagnosis   • Osteoarthritis   • Osteopenia        Past Medical History   Diagnosis Date   • Benign paroxysmal positional vertigo    • Breast lump      left breast benign on u/s guided mammotone bx      • Contact dermatitis      olecranon area from chair material?     • Cough    • Encounter for long-term (current) use of medications    • GERD (gastroesophageal reflux disease)    • H/O screening mammography    • Hip pain    • History of Holter monitoring 2013     Overall rhythm sinus rhythm with average heart rate of 73 bpm, min 49 , max 111. Frequent isolated with frequent bigeminal cycles, total of 491 bigeminal cycles. No couplets of runs of ventricular tachycardia. Rare isolated PACs, total of 25.   • History of urinary disorder    • Large ovary    • Myalgia    • Osteoarthritis    • Osteopenia    • Otalgia of left ear      resolved spontaneously      • Palpitations    • Postmenopausal state    • Screening for malignant neoplasm of breast       left breast benign mammotone bx      • Screening for osteoporosis    • Vertigo      resolved        Past Surgical History   Procedure Laterality Date   • Breast biopsy  2014     Ultrasound guided left breast mammotome biopsy with 8 gauge needle and also left a clip.   • Colonoscopy  2013     Hemorrhoids found.   • Pap smear  2015     WNL, CARD SENT, DR. CARLSON'S OFFICE   • Injection of medication  2015     PNEUMOC VAC/ADMIN/RCVD 4040F (1)      • Hip surgery Right 2017     Total hip-lateral  approach             PT Ortho       03/06/17 1342    Subjective Comments    Subjective Comments Hip Precautions have been lifted.  Pt reports that MD appt went well.  Doesn't want to see her back for 6 weeks.  Reports that her walking without the cane is improving once she takes a few steps then it is good.  First few steps are painful but it quickly goes away once she take a few more steps. No problems with anything at home.  -    Precautions and Contraindications    Precautions/Limitations no known precautions/limitations  -    Precautions Total hip Protocol  -    Contraindications NO RESTRCITIONS  -    Posture/Observations    Posture/Observations Comments No AD this date, mild trendelnburg with first few steps.  -      User Key  (r) = Recorded By, (t) = Taken By, (c) = Cosigned By    Initials Name Provider Type    CECIL Ball PTA Physical Therapy Assistant                            PT Assessment/Plan       03/06/17 1342       PT Assessment    Assessment Comments good tolerance to treatment.  Able to complete 6 mintue walk test without increase pain  -     PT Plan    PT Frequency 2x/week  -     Predicted Duration of Therapy Intervention (days/wks) 3 weeks  -     PT Plan Comments Continue to increase with gait and no limp.  Pt to start back to exercise clss this week to progress strength.   -       User Key  (r) = Recorded By, (t) = Taken By, (c) = Cosigned By    Initials Name Provider Type    CECIL Ball PTA Physical Therapy Assistant                    Exercises       03/06/17 1342          Subjective Comments    Subjective Comments Hip Precautions have been lifted.  Pt reports that MD appt went well.  Doesn't want to see her back for 6 weeks.  Reports that her walking without the cane is improving once she takes a few steps then it is good.  First few steps are painful but it quickly goes away once she take a few more steps. No problems with anything at home.  -      Subjective Pain     Able to rate subjective pain? yes  -KH      Pre-Treatment Pain Level 0  -KH      Post-Treatment Pain Level 0  -KH      Exercise 1    Exercise Name 1 Pro ll level 4  -KH      Time (Minutes) 1 10 minutes  -KH      Exercise 2    Exercise Name 2 gait without AD  -KH      Time (Minutes) 2 250 ft x1  -KH      Exercise 3    Exercise Name 3 cybex hip abd/add  -KH      Sets 3 3  -KH      Reps 3 10  -KH      Time (Seconds) 3 20# each  -KH      Exercise 4    Exercise Name 4 cybex Leg Press  -KH      Sets 4 3  -KH      Reps 4 10  -KH      Time (Minutes) 4 60#  -KH      Exercise 5    Exercise Name 5 Ramp Up Fwd/Down Fwd  -KH      Sets 5 5  -KH      Exercise 6    Exercise Name 6 Ramp Up bkd/ down fwd  -KH      Sets 6 5  -KH      Exercise 7    Exercise Name 7 Ramp Lat R/L lead  -KH      Sets 7 3  -KH      Exercise 8    Exercise Name 8 6 minute walk-test  -      Time (Seconds) 8 1900 feet  -        User Key  (r) = Recorded By, (t) = Taken By, (c) = Cosigned By    Initials Name Provider Type    CECIL Ball, PTA Physical Therapy Assistant                               PT OP Goals       03/06/17 1342       PT Short Term Goals    STG Date to Achieve 02/20/17  -     STG 1 Independent in HEP   -KH     STG 1 Progress Met  -KH     STG 2 SLR x10 no lag  -KH     STG 2 Progress Met  -KH     STG 3 Sit to stand no UE assist  -     STG 3 Progress Met  -KH     Long Term Goals    LTG Date to Achieve 03/13/17  -     LTG 1 Independent in final HEP   -KH     LTG 1 Progress Ongoing  -KH     LTG 2 SLS RLE 5 seconds level ground with eyes open  -     LTG 2 Progress Partially Met  -KH     LTG 3 Hip flexion 5/5  -KH     LTG 3 Progress Progressing;Not Met  -KH     LTG 4 Hip abduction 5/5  -KH     LTG 4 Progress Progressing;Not Met  -KH     LTG 5 No increase in pain with 6 minute walk test  -     LTG 5 Progress Met  -KH     Time Calculation    PT Goal Re-Cert Due Date 03/20/17   14/14. MD april 2017, 85%, medicare  -       User Key  (r) =  Recorded By, (t) = Taken By, (c) = Cosigned By    Initials Name Provider Type    CECIL Ball PTA Physical Therapy Assistant                    Time Calculation:   Start Time: 1342  Stop Time: 1420  Time Calculation (min): 38 min  Total Timed Code Minutes- PT: 38 minute(s)    Therapy Charges for Today     Code Description Service Date Service Provider Modifiers Qty    [id number]  GAIT TRAINING EA 15 MIN 3/6/2017 Sheree Ball PTA GP 1    [id number]  PT THER PROC EA 15 MIN 3/6/2017 Sheree Ball PTA GP 2                    Sheree Ball PTA  3/6/2017

## 2017-03-08 ENCOUNTER — HOSPITAL ENCOUNTER (OUTPATIENT)
Dept: PHYSICAL THERAPY | Facility: HOSPITAL | Age: 67
Setting detail: THERAPIES SERIES
Discharge: HOME | End: 2017-03-08

## 2017-03-08 DIAGNOSIS — M25.551 RIGHT HIP PAIN: ICD-10-CM

## 2017-03-08 DIAGNOSIS — Z96.641 STATUS POST TOTAL REPLACEMENT OF RIGHT HIP: Primary | ICD-10-CM

## 2017-03-08 PROCEDURE — 97110 THERAPEUTIC EXERCISES: CPT

## 2017-03-08 NOTE — PROGRESS NOTES
Outpatient Physical Therapy Ortho Treatment Note  [facility]     Patient Name: Kalli Lowe  : 1950  MRN: [id number]  Today's Date: 3/8/2017      Visit Date: 2017     Subjective Improvement: 85%  MD visit: 2017  Visit Number: 15/15  Total Approved:medicare  Recert Date: 3/22/17          Visit Dx:    ICD-10-CM ICD-9-CM   1. Status post total replacement of right hip Z96.641 V43.64   2. Right hip pain M25.551 719.45       Patient Active Problem List   Diagnosis   • Osteoarthritis   • Osteopenia        Past Medical History   Diagnosis Date   • Benign paroxysmal positional vertigo    • Breast lump      left breast benign on u/s guided mammotone bx      • Contact dermatitis      olecranon area from chair material?     • Cough    • Encounter for long-term (current) use of medications    • GERD (gastroesophageal reflux disease)    • H/O screening mammography    • Hip pain    • History of Holter monitoring 2013     Overall rhythm sinus rhythm with average heart rate of 73 bpm, min 49 , max 111. Frequent isolated with frequent bigeminal cycles, total of 491 bigeminal cycles. No couplets of runs of ventricular tachycardia. Rare isolated PACs, total of 25.   • History of urinary disorder    • Large ovary    • Myalgia    • Osteoarthritis    • Osteopenia    • Otalgia of left ear      resolved spontaneously      • Palpitations    • Postmenopausal state    • Screening for malignant neoplasm of breast       left breast benign mammotone bx      • Screening for osteoporosis    • Vertigo      resolved        Past Surgical History   Procedure Laterality Date   • Breast biopsy  2014     Ultrasound guided left breast mammotome biopsy with 8 gauge needle and also left a clip.   • Colonoscopy  2013     Hemorrhoids found.   • Pap smear  2015     WNL, CARD SENT, DR. CARLSON'S OFFICE   • Injection of medication  2015     PNEUMOC VAC/ADMIN/RCVD 4040F (1)      • Hip surgery  Right 01/24/2017     Total hip-lateral approach             PT Ortho       03/08/17 1300    Precautions and Contraindications    Precautions/Limitations no known precautions/limitations  -    Posture/Observations    Posture/Observations Comments No assistive device this date.  First two steps antalgic but decreased. LL measured this date R LE 81 cm, L LE 82 cm  -      03/06/17 1342    Subjective Comments    Subjective Comments Hip Precautions have been lifted.  Pt reports that MD appt went well.  Doesn't want to see her back for 6 weeks.  Reports that her walking without the cane is improving once she takes a few steps then it is good.  First few steps are painful but it quickly goes away once she take a few more steps. No problems with anything at home.  -    Precautions and Contraindications    Precautions/Limitations no known precautions/limitations  -    Precautions Total hip Protocol  -    Contraindications NO RESTRCITIONS  -    Posture/Observations    Posture/Observations Comments No AD this date, mild [place] with first few steps.  -      User Key  (r) = Recorded By, (t) = Taken By, (c) = Cosigned By    Initials Name Provider Type     Sheree Ball PTA Physical Therapy Assistant                            PT Assessment/Plan       03/08/17 1300       PT Assessment    Assessment Comments Met SLS goal this date. pt with good tolerance to treatment.  Measured leg length this date and right leg is short vs. the left.  Gave pt a heel lift and felt it helped but educated pt to d/c it if pain increases or balance with gait decreases.  Did well with resisted gait this date.   -     PT Plan    PT Frequency 2x/week  -     Predicted Duration of Therapy Intervention (days/wks) 3 weeks  -     PT Plan Comments Continue to increase gait and strength as able.  Monitor response to heel lift.   -       User Key  (r) = Recorded By, (t) = Taken By, (c) = Cosigned By    Initials Name Provider Type    CECIL  "Sheree Ball PTA Physical Therapy Assistant                    Exercises       03/08/17 1300          Subjective Comments    Subjective Comments Pt reports that [place] without a problem this day.  Hip getting stronger.   -KH      Subjective Pain    Able to rate subjective pain? yes  -KH      Pre-Treatment Pain Level 0  -KH      Post-Treatment Pain Level 0  -KH      Exercise 1    Exercise Name 1 Pro ll Level 4   -KH      Time (Minutes) 1 10 minutes  -KH      Exercise 2    Exercise Name 2 incline stretch  -KH      Sets 2 3  -KH      Time (Seconds) 2 30\" hold  -KH      Exercise 3    Exercise Name 3 CC Resisted gait fwd/bkd  -KH      Weights/Plates 3 3 Plates  -KH      Sets 3 1  -KH      Reps 3 5  -KH      Exercise 4    Exercise Name 4 CC resisted gait bkd/fwd  -KH      Weights/Plates 4 3 Plates  -KH      Sets 4 1  -KH      Reps 4 5  -KH      Exercise 5    Exercise Name 5 CC resisted gait lateral (R/L Lead)  -KH      Weights/Plates 5 2 Plates  -KH      Sets 5 1  -KH      Reps 5 3  -KH      Exercise 6    Exercise Name 6 LAQ with ADD  -KH      Sets 6 3  -KH      Reps 6 10  -KH      Exercise 7    Exercise Name 7 SLS no hold B  -KH      Sets 7 5  -KH      Time (Seconds) 7 10 sec hold  -KH      Exercise 8    Exercise Name 8 gait with exaggerated march  -KH      Time (Seconds) 8 360 ft x1  -KH        User Key  (r) = Recorded By, (t) = Taken By, (c) = Cosigned By    Initials Name Provider Type     Sheree Ball PTA Physical Therapy Assistant                               PT OP Goals       03/08/17 1300       PT Short Term Goals    STG Date to Achieve 02/20/17  -KH     STG 1 Independent in HEP   -KH     STG 1 Progress Met  -KH     STG 2 SLR x10 no lag  -KH     STG 2 Progress Met  -KH     STG 3 Sit to stand no UE assist  -KH     STG 3 Progress Met  -KH     Long Term Goals    LTG Date to Achieve 03/13/17  -KH     LTG 1 Independent in final HEP   -KH     LTG 1 Progress Ongoing  -KH     LTG 2 SLS RLE 5 " seconds level ground with eyes open  -     LTG 2 Progress Met  -     LTG 3 Hip flexion 5/5  -     LTG 3 Progress Progressing;Not Met  -     LTG 4 Hip abduction 5/5  -     LTG 4 Progress Progressing;Not Met  -     LTG 5 No increase in pain with 6 minute walk test  -     LTG 5 Progress Met  -     Time Calculation    PT Goal Re-Cert Due Date 03/20/17   15/15, MD april 2017, 85%, medicare  -       User Key  (r) = Recorded By, (t) = Taken By, (c) = Cosigned By    Initials Name Provider Type    CECIL Ball PTA Physical Therapy Assistant                    Time Calculation:   Start Time: 1300  Stop Time: 1345  Time Calculation (min): 45 min  Total Timed Code Minutes- PT: 45 minute(s)    Therapy Charges for Today     Code Description Service Date Service Provider Modifiers Qty    48709472109 HC PT THER PROC EA 15 MIN 3/8/2017 Sheree Ball PTA GP 3                    Sheree Ball PTA  3/8/2017

## 2017-03-15 ENCOUNTER — HOSPITAL ENCOUNTER (OUTPATIENT)
Dept: PHYSICAL THERAPY | Facility: HOSPITAL | Age: 67
Setting detail: THERAPIES SERIES
Discharge: HOME | End: 2017-03-15

## 2017-03-15 DIAGNOSIS — Z96.641 STATUS POST TOTAL REPLACEMENT OF RIGHT HIP: Primary | ICD-10-CM

## 2017-03-15 DIAGNOSIS — M25.551 RIGHT HIP PAIN: ICD-10-CM

## 2017-03-15 PROCEDURE — 97110 THERAPEUTIC EXERCISES: CPT

## 2017-03-15 NOTE — PROGRESS NOTES
Outpatient Physical Therapy Ortho Treatment Note  [facility]     Patient Name: aKlli Lowe  : 1950  MRN: [id number]  Today's Date: 3/15/2017      Visit Date: 03/15/2017     Subjective Improvement: 85%  MD visit: 2017  Visit Number:   Total Approved:medicare  Recert Date: 3/22/17      Visit Dx:    ICD-10-CM ICD-9-CM   1. Status post total replacement of right hip Z96.641 V43.64   2. Right hip pain M25.551 719.45       Patient Active Problem List   Diagnosis   • Osteoarthritis   • Osteopenia        Past Medical History   Diagnosis Date   • Benign paroxysmal positional vertigo    • Breast lump      left breast benign on u/s guided mammotone bx      • Contact dermatitis      olecranon area from chair material?     • Cough    • Encounter for long-term (current) use of medications    • GERD (gastroesophageal reflux disease)    • H/O screening mammography    • Hip pain    • History of Holter monitoring 2013     Overall rhythm sinus rhythm with average heart rate of 73 bpm, min 49 , max 111. Frequent isolated with frequent bigeminal cycles, total of 491 bigeminal cycles. No couplets of runs of ventricular tachycardia. Rare isolated PACs, total of 25.   • History of urinary disorder    • Large ovary    • Myalgia    • Osteoarthritis    • Osteopenia    • Otalgia of left ear      resolved spontaneously      • Palpitations    • Postmenopausal state    • Screening for malignant neoplasm of breast       left breast benign mammotone bx      • Screening for osteoporosis    • Vertigo      resolved        Past Surgical History   Procedure Laterality Date   • Breast biopsy  2014     Ultrasound guided left breast mammotome biopsy with 8 gauge needle and also left a clip.   • Colonoscopy  2013     Hemorrhoids found.   • Pap smear  2015     WNL, CARD SENT, DR. CARLSON'S OFFICE   • Injection of medication  2015     PNEUMOC VAC/ADMIN/RCVD 4040F (1)      • Hip surgery Right  "01/24/2017     Total hip-lateral approach             PT Ortho       03/15/17 1435    Precautions and Contraindications    Precautions/Limitations no known precautions/limitations  -    Precautions total hip protocol  -    Contraindications no restrictions  -    Posture/Observations    Posture/Observations Comments no AD/ gait with slight antalgia   -      User Key  (r) = Recorded By, (t) = Taken By, (c) = Cosigned By    Initials Name Provider Type    CECIL Ball PTA Physical Therapy Assistant                            PT Assessment/Plan       03/15/17 1435       PT Assessment    Assessment Comments improving gait and strength  -     PT Plan    PT Frequency 2x/week  -     Predicted Duration of Therapy Intervention (days/wks) 3 weeks  -     PT Plan Comments Recheck with PT next week with possible d/c at that time  -       User Key  (r) = Recorded By, (t) = Taken By, (c) = Cosigned By    Initials Name Provider Type    CECIL Ball PTA Physical Therapy Assistant                    Exercises       03/15/17 1435          Subjective Comments    Subjective Comments Pt reports that her hip is doing well.  Still has difficulty with first step when getting up but improving.   -      Subjective Pain    Able to rate subjective pain? yes  -      Pre-Treatment Pain Level 0  -KH      Post-Treatment Pain Level 0  -KH      Exercise 1    Exercise Name 1 Pro ll Level 4.0  -KH      Time (Minutes) 1 10'  -KH      Exercise 2    Exercise Name 2 standing hip flexor stretch  -KH      Sets 2 3  -KH      Time (Seconds) 2 30' hold  -KH      Exercise 3    Exercise Name 3 airex step up&over  -KH      Sets 3 2  -KH      Reps 3 10  -KH      Exercise 4    Exercise Name 4 airex stand w/ hip flex L hold for 5\"  -KH      Sets 4 2  -KH      Reps 4 10  -KH      Exercise 5    Exercise Name 5 foam beam lateral stepping  -KH      Sets 5 1  -KH      Reps 5 5  -KH      Exercise 6    Exercise Name 6 foam beam heel-toe  -KH      Sets " 6 1  -KH      Reps 6 5  -KH      Exercise 7    Exercise Name 7 sit to stand and go  -      Sets 7 1  -KH      Reps 7 10  -KH      Exercise 8    Exercise Name 8 LAQ w/ add squeeze  -      Sets 8 3  -KH      Reps 8 10  -KH        User Key  (r) = Recorded By, (t) = Taken By, (c) = Cosigned By    Initials Name Provider Type    CECIL Ball PTA Physical Therapy Assistant                               PT OP Goals       03/15/17 1435       PT Short Term Goals    STG Date to Achieve 02/20/17  -     STG 1 Independent in HEP   -     STG 1 Progress Met  -     STG 2 SLR x10 no lag  -     STG 2 Progress Met  -     STG 3 Sit to stand no UE assist  -     STG 3 Progress Met  -     Long Term Goals    LTG Date to Achieve 03/13/17  -     LTG 1 Independent in final HEP   -     LTG 1 Progress Ongoing  -     LTG 2 SLS RLE 5 seconds level ground with eyes open  -     LTG 2 Progress Met  -     LTG 3 Hip flexion 5/5  -KH     LTG 3 Progress Progressing;Not Met  -     LTG 4 Hip abduction 5/5  -KH     LTG 4 Progress Progressing;Not Met  -     LTG 5 No increase in pain with 6 minute walk test  -     LTG 5 Progress Met  -     Time Calculation    PT Goal Re-Cert Due Date 03/20/17  -       User Key  (r) = Recorded By, (t) = Taken By, (c) = Cosigned By    Initials Name Provider Type    CECIL Ball PTA Physical Therapy Assistant                    Time Calculation:   Start Time: 1435  Stop Time: 1515  Time Calculation (min): 40 min  Total Timed Code Minutes- PT: 40 minute(s)    Therapy Charges for Today     Code Description Service Date Service Provider Modifiers Qty    60473226093 HC PT THER PROC EA 15 MIN 3/15/2017 Sheree Ball PTA GP 3                    Sheree Ball PTA  3/15/2017

## 2017-03-17 ENCOUNTER — HOSPITAL ENCOUNTER (OUTPATIENT)
Dept: PHYSICAL THERAPY | Facility: HOSPITAL | Age: 67
Setting detail: THERAPIES SERIES
Discharge: HOME | End: 2017-03-17

## 2017-03-17 DIAGNOSIS — M25.551 RIGHT HIP PAIN: ICD-10-CM

## 2017-03-17 DIAGNOSIS — Z96.641 STATUS POST TOTAL REPLACEMENT OF RIGHT HIP: Primary | ICD-10-CM

## 2017-03-17 PROCEDURE — 97110 THERAPEUTIC EXERCISES: CPT

## 2017-03-17 PROCEDURE — 97140 MANUAL THERAPY 1/> REGIONS: CPT

## 2017-03-17 NOTE — PROGRESS NOTES
Outpatient Physical Therapy Ortho Treatment Note  [facility]     Patient Name: Kalli Lowe  : 1950  MRN: [id number]  Today's Date: 3/17/2017      Visit Date: 2017    Subjective Improvement: 85%   Visits Attended:    Visits Approved Med Nec/Medicare MD visit: 2017   Recert Date: 3/20/17         Visit Dx:    ICD-10-CM ICD-9-CM   1. Status post total replacement of right hip Z96.641 V43.64   2. Right hip pain M25.551 719.45       Patient Active Problem List   Diagnosis   • Osteoarthritis   • Osteopenia        Past Medical History   Diagnosis Date   • Benign paroxysmal positional vertigo    • Breast lump      left breast benign on u/s guided mammotone bx      • Contact dermatitis      olecranon area from chair material?     • Cough    • Encounter for long-term (current) use of medications    • GERD (gastroesophageal reflux disease)    • H/O screening mammography    • Hip pain    • History of Holter monitoring 2013     Overall rhythm sinus rhythm with average heart rate of 73 bpm, min 49 , max 111. Frequent isolated with frequent bigeminal cycles, total of 491 bigeminal cycles. No couplets of runs of ventricular tachycardia. Rare isolated PACs, total of 25.   • History of urinary disorder    • Large ovary    • Myalgia    • Osteoarthritis    • Osteopenia    • Otalgia of left ear      resolved spontaneously      • Palpitations    • Postmenopausal state    • Screening for malignant neoplasm of breast       left breast benign mammotone bx      • Screening for osteoporosis    • Vertigo      resolved        Past Surgical History   Procedure Laterality Date   • Breast biopsy  2014     Ultrasound guided left breast mammotome biopsy with 8 gauge needle and also left a clip.   • Colonoscopy  2013     Hemorrhoids found.   • Pap smear  2015     WNL, CARD SENT, DR. CARLSON'S OFFICE   • Injection of medication  2015     PNEUMOC VAC/ADMIN/RCVD 4040F (1)      •  "Hip surgery Right 01/24/2017     Total hip-lateral approach             PT Ortho       03/17/17 1230    Precautions and Contraindications    Precautions/Limitations no known precautions/limitations  -    Precautions total hip protocol  -    Contraindications no restrictions  -    Posture/Observations    Posture/Observations Comments no AD; gait with antalgia wi/ first 2 steps then no antalgia; TTP R hip flexor  -      03/15/17 1435    Precautions and Contraindications    Precautions/Limitations no known precautions/limitations  -    Precautions total hip protocol  -    Contraindications no restrictions  -    Posture/Observations    Posture/Observations Comments no AD/ gait with slight antalgia   -      User Key  (r) = Recorded By, (t) = Taken By, (c) = Cosigned By    Initials Name Provider Type    CECIL Ball PTA Physical Therapy Assistant                            PT Assessment/Plan       03/17/17 1230       PT Assessment    Assessment Comments improved gait and \"catch\" after manual this date.  Improving but still has slight catch in right hip.  Glute med weakness still noted also .    -     PT Plan    PT Frequency 2x/week  -     Predicted Duration of Therapy Intervention (days/wks) 3 weeks  -     PT Plan Comments recheck w/ PT next week.  Continue with manual/STM to right hip flexor if continues to see it helps.   -       User Key  (r) = Recorded By, (t) = Taken By, (c) = Cosigned By    Initials Name Provider Type    CECIL Ball PTA Physical Therapy Assistant                    Exercises       03/17/17 1230          Subjective Comments    Subjective Comments Pt reports doing exercise class this am without a problem;   -      Subjective Pain    Able to rate subjective pain? yes  -      Pre-Treatment Pain Level 0  -      Post-Treatment Pain Level 0  -      Exercise 1    Exercise Name 1 Pro ll Level 4.0  -      Time (Minutes) 1 10'  -      Exercise 2    Exercise Name 2 " "standing hamstring stretch   -KH      Sets 2 3  -KH      Time (Minutes) 2 3  -KH      Time (Seconds) 2 30\" holds  -KH      Exercise 3    Exercise Name 3 foam beam lateral stepping  -KH      Sets 3 Other   6x  -KH      Exercise 4    Exercise Name 4 BOSU fwd/lat step ups  -KH      Sets 4 2  -KH      Reps 4 10  -KH      Exercise 5    Exercise Name 5 SL Hip Abd with weight  -KH      Weights/Plates 5 2  -KH      Sets 5 2  -KH      Reps 5 10  -KH      Exercise 6    Exercise Name 6 clam shells  -KH      Weights/Plates 6 2  -KH      Sets 6 3  -KH      Reps 6 10  -KH      Exercise 7    Exercise Name 7 Bridges with hips in ABD  -KH      Sets 7 3  -KH      Reps 7 10  -KH      Exercise 8    Exercise Name 8 LAQ w/ ADD  -KH      Sets 8 3  -KH      Reps 8 10  -KH        User Key  (r) = Recorded By, (t) = Taken By, (c) = Cosigned By    Initials Name Provider Type    CECIL Ball PTA Physical Therapy Assistant                        Manual Rx (last 36 hours)      Manual Treatments       03/17/17 1230          Manual Rx 1    Manual Rx 1 Location R HIP   -KH      Manual Rx 1 Type Gentle inferior mobes/STM to R hip flexor  -KH      Manual Rx 1 Duration 10'  -KH        User Key  (r) = Recorded By, (t) = Taken By, (c) = Cosigned By    Initials Name Provider Type    CECIL Ball PTA Physical Therapy Assistant                PT OP Goals       03/17/17 1230       PT Short Term Goals    STG Date to Achieve 02/20/17  -KH     STG 1 Independent in HEP   -KH     STG 1 Progress Met  -KH     STG 2 SLR x10 no lag  -KH     STG 2 Progress Met  -KH     STG 3 Sit to stand no UE assist  -KH     STG 3 Progress Met  -KH     Long Term Goals    LTG Date to Achieve 03/13/17  -KH     LTG 1 Independent in final HEP   -KH     LTG 1 Progress Ongoing  -KH     LTG 2 SLS RLE 5 seconds level ground with eyes open  -KH     LTG 2 Progress Met  -KH     LTG 3 Hip flexion 5/5  -KH     LTG 3 Progress Progressing;Not Met  -KH     LTG 4 Hip abduction 5/5  -KH     LTG 4 " Progress Progressing;Not Met  -     LTG 5 No increase in pain with 6 minute walk test  -     LTG 5 Progress Met  -     Time Calculation    PT Goal Re-Cert Due Date 03/20/17  -       User Key  (r) = Recorded By, (t) = Taken By, (c) = Cosigned By    Initials Name Provider Type    CECIL Ball PTA Physical Therapy Assistant                    Time Calculation:   Start Time: 1230  Stop Time: 1325  Time Calculation (min): 55 min  Total Timed Code Minutes- PT: 55 minute(s)    Therapy Charges for Today     Code Description Service Date Service Provider Modifiers Qty    53661855081 HC PT MANUAL THERAPY EA 15 MIN 3/17/2017 Sheree Ball PTA GP 1    35253152146 HC PT THER PROC EA 15 MIN 3/17/2017 Sheree Ball PTA GP 3                    Sheree Ball PTA  3/17/2017

## 2017-03-20 ENCOUNTER — HOSPITAL ENCOUNTER (OUTPATIENT)
Dept: PHYSICAL THERAPY | Facility: HOSPITAL | Age: 67
Setting detail: THERAPIES SERIES
Discharge: HOME | End: 2017-03-20

## 2017-03-20 DIAGNOSIS — Z96.641 STATUS POST TOTAL REPLACEMENT OF RIGHT HIP: Primary | ICD-10-CM

## 2017-03-20 PROCEDURE — 97110 THERAPEUTIC EXERCISES: CPT

## 2017-03-20 NOTE — PROGRESS NOTES
Outpatient Physical Therapy Ortho Treatment Note  [facility]     Patient Name: Kalli Lowe  : 1950  MRN: [id number]  Today's Date: 3/20/2017      Visit Date: 2017  Pt has attended  visits  MD keys in 2017  Recert 17  Subjective improvement 85 percent  Visit Dx:    ICD-10-CM ICD-9-CM   1. Status post total replacement of right hip Z96.641 V43.64       Patient Active Problem List   Diagnosis   • Osteoarthritis   • Osteopenia        Past Medical History   Diagnosis Date   • Benign paroxysmal positional vertigo    • Breast lump      left breast benign on u/s guided mammotone bx      • Contact dermatitis      olecranon area from chair material?     • Cough    • Encounter for long-term (current) use of medications    • GERD (gastroesophageal reflux disease)    • H/O screening mammography    • Hip pain    • History of Holter monitoring 2013     Overall rhythm sinus rhythm with average heart rate of 73 bpm, min 49 , max 111. Frequent isolated with frequent bigeminal cycles, total of 491 bigeminal cycles. No couplets of runs of ventricular tachycardia. Rare isolated PACs, total of 25.   • History of urinary disorder    • Large ovary    • Myalgia    • Osteoarthritis    • Osteopenia    • Otalgia of left ear      resolved spontaneously      • Palpitations    • Postmenopausal state    • Screening for malignant neoplasm of breast       left breast benign mammotone bx      • Screening for osteoporosis    • Vertigo      resolved        Past Surgical History   Procedure Laterality Date   • Breast biopsy  2014     Ultrasound guided left breast mammotome biopsy with 8 gauge needle and also left a clip.   • Colonoscopy  2013     Hemorrhoids found.   • Pap smear  2015     WNL, CARD SENT, DR. CARLSON'S OFFICE   • Injection of medication  2015     PNEUMOC VAC/ADMIN/RCVD 4040F (1)      • Hip surgery Right 2017     Total hip-lateral approach             PT  Ortho       03/17/17 1230    Precautions and Contraindications    Precautions/Limitations no known precautions/limitations  -KH    Precautions total hip protocol  -KH    Contraindications no restrictions  -KH    Posture/Observations    Posture/Observations Comments no AD; gait with antalgia wi/ first 2 steps then no antalgia; TTP R hip flexor  -KH      User Key  (r) = Recorded By, (t) = Taken By, (c) = Cosigned By    Initials Name Provider Type    KH Sheree Ball PTA Physical Therapy Assistant                            PT Assessment/Plan       03/20/17 1147       PT Assessment    Assessment Comments Tolerates activity well gait pattern improving   -SP     PT Plan    PT Plan Comments Continue with POR seem primary PT 03-22-17 for recheck ??? continue or D/c  -SP       User Key  (r) = Recorded By, (t) = Taken By, (c) = Cosigned By    Initials Name Provider Type    SP Mary Kate Craig PTA Physical Therapy Assistant                    Exercises       03/20/17 1100          Subjective Comments    Subjective Comments No new complaints  -SP      Subjective Pain    Able to rate subjective pain? yes  -SP      Pre-Treatment Pain Level --   .7  -SP      Post-Treatment Pain Level 0   .5  -SP      Exercise 1    Exercise Name 1 Pro 2 Level 4  -SP      Time (Minutes) 1 10 min  -SP      Exercise 2    Exercise Name 2 HS S  -SP      Sets 2 3  -SP      Time (Seconds) 2 30 sec  -SP      Exercise 3    Exercise Name 3 foam beam lateral walk  -SP      Sets 3 --   6 times  -SP      Exercise 4    Exercise Name 4 Bosu fwd/lateral step ups  -SP      Sets 4 2  -SP      Reps 4 10  -SP      Exercise 5    Exercise Name 5 standing hip abd  -SP      Equipment 5 --   2 pound leg weight  -SP      Sets 5 2  -SP      Reps 5 10  -SP      Exercise 6    Exercise Name 6 standing march  -SP      Weights/Plates 6 --   2 pound  -SP      Sets 6 2  -SP      Reps 6 10  -SP      Exercise 7    Exercise Name 7 bridge with add  -SP      Sets 7 3  -SP      Reps 7 10   -SP      Exercise 8    Exercise Name 8 Sharri disc LAQ with add  -SP      Sets 8 3  -SP      Reps 8 10  -SP      Exercise 9    Exercise Name 9 Lateral step ups 4 inch  -SP      Sets 9 2  -SP      Reps 9 10  -SP      Exercise 10    Exercise Name 10 Ramp walk fwd/retro  -SP      Reps 10 3  -SP      Exercise 11    Exercise Name 11 Ramp lateral B sides  -SP      Reps 11 1  -SP        User Key  (r) = Recorded By, (t) = Taken By, (c) = Cosigned By    Initials Name Provider Type    SP Mary Kate Craig PTA Physical Therapy Assistant                               PT OP Goals       03/20/17 1149 03/20/17 1100    PT Short Term Goals    STG Date to Achieve  02/20/17  -SP    STG 1  Independent in HEP   -SP    STG 1 Progress  Met  -SP    STG 2  SLR x10 no lag  -SP    STG 2 Progress  Met  -SP    STG 3  Sit to stand no UE assist  -SP    STG 3 Progress  Met  -SP    Long Term Goals    LTG Date to Achieve  03/13/17  -SP    LTG 1  Independent in final HEP   -SP    LTG 1 Progress  Ongoing  -SP    LTG 2  SLS RLE 5 seconds level ground with eyes open  -SP    LTG 2 Progress  Met  -SP    LTG 3  Hip flexion 5/5  -SP    LTG 3 Progress  Progressing;Not Met  -SP    LTG 4  Hip abduction 5/5  -SP    LTG 4 Progress  Progressing;Not Met  -SP    LTG 5  No increase in pain with 6 minute walk test  -SP    LTG 5 Progress  Met  -SP    Time Calculation    PT Goal Re-Cert Due Date 03/20/17  -SP 03/20/17  -SP      User Key  (r) = Recorded By, (t) = Taken By, (c) = Cosigned By    Initials Name Provider Type    FREEDOM Craig PTA Physical Therapy Assistant                    Time Calculation:   Start Time: 1100  Stop Time: 1145  Time Calculation (min): 45 min  Total Timed Code Minutes- PT: 45 minute(s)    Therapy Charges for Today     Code Description Service Date Service Provider Modifiers Qty    60985174204  PT THER PROC EA 15 MIN 3/20/2017 Mary Kate Craig PTA GP 3                    Mary Kate Craig PTA  3/20/2017

## 2017-03-22 ENCOUNTER — APPOINTMENT (OUTPATIENT)
Dept: PHYSICAL THERAPY | Facility: HOSPITAL | Age: 67
End: 2017-03-22

## 2017-03-22 ENCOUNTER — HOSPITAL ENCOUNTER (OUTPATIENT)
Dept: PHYSICAL THERAPY | Facility: HOSPITAL | Age: 67
Setting detail: THERAPIES SERIES
Discharge: HOME | End: 2017-03-22

## 2017-03-22 DIAGNOSIS — Z96.641 STATUS POST TOTAL REPLACEMENT OF RIGHT HIP: Primary | ICD-10-CM

## 2017-03-22 PROCEDURE — 97110 THERAPEUTIC EXERCISES: CPT | Performed by: PHYSICAL THERAPIST

## 2017-03-22 PROCEDURE — G8980 MOBILITY D/C STATUS: HCPCS | Performed by: PHYSICAL THERAPIST

## 2017-03-22 PROCEDURE — G8979 MOBILITY GOAL STATUS: HCPCS | Performed by: PHYSICAL THERAPIST

## 2017-03-22 NOTE — THERAPY DISCHARGE NOTE
Outpatient Physical Therapy Ortho Progress Note/Discharge Summary  [facility]     Patient Name: Kalli Lowe  : 1950  MRN: [id number]  Today's Date: 3/22/2017      Visit Date: 2017  Attendance:   Subjective % Improvement: 85%  Recert Date: N/A  MD appointment: 3-4 weeks    Visit Dx:    ICD-10-CM ICD-9-CM   1. Status post total replacement of right hip Z96.641 V43.64       Patient Active Problem List   Diagnosis   • Osteoarthritis   • Osteopenia        Past Medical History:   Diagnosis Date   • Benign paroxysmal positional vertigo    • Breast lump     left breast benign on u/s guided mammotone bx      • Contact dermatitis     olecranon area from chair material?     • Cough    • Encounter for long-term (current) use of medications    • GERD (gastroesophageal reflux disease)    • H/O screening mammography    • Hip pain    • History of Holter monitoring 2013    Overall rhythm sinus rhythm with average heart rate of 73 bpm, min 49 , max 111. Frequent isolated with frequent bigeminal cycles, total of 491 bigeminal cycles. No couplets of runs of ventricular tachycardia. Rare isolated PACs, total of 25.   • History of urinary disorder    • Large ovary    • Myalgia    • Osteoarthritis    • Osteopenia    • Otalgia of left ear     resolved spontaneously      • Palpitations    • Postmenopausal state    • Screening for malignant neoplasm of breast      left breast benign mammotone bx      • Screening for osteoporosis    • Vertigo     resolved        Past Surgical History:   Procedure Laterality Date   • BREAST BIOPSY  2014    Ultrasound guided left breast mammotome biopsy with 8 gauge needle and also left a clip.   • COLONOSCOPY  2013    Hemorrhoids found.   • HIP SURGERY Right 2017    Total hip-lateral approach   • INJECTION OF MEDICATION  2015    PNEUMOC VAC/ADMIN/RCVD 4040F (1)      • PAP SMEAR  2015    WNL, CARD SENT, DR. CARLSON'S OFFICE              PT Ortho       03/22/17 1630    Precautions and Contraindications    Precautions/Limitations no known precautions/limitations  -BB    Precautions total hip protocol  -BB    Contraindications no restrictions  -BB    Posture/Observations    Posture/Observations Comments No assistive device. Has mild antalgics with first two steps then no antalgics noted.   -BB    Sensation    Sensation WNL? WNL  -BB    Sensory Screen for Light Touch- Lower Quarter Clearing    L2 (anterior mid thigh) Intact  -BB    L3 (distal anterior thigh) Intact  -BB    L4 (medial lower leg/foot) Intact  -BB    L5 (lateral lower leg/great toe) Intact  -BB    Special Tests/Palpation    Special Tests/Palpation --   mild ttp right iliopsoas  -BB    ROM (Range of Motion)    General ROM Detail standing hip flex: 60, abd 30, ext 12   -BB    MMT (Manual Muscle Testing)    General MMT Assessment Detail hip flexion: 4/5, abd 4/5 add 4+/5  -BB    Balance Skills Training    SLS bilaterally 25 plus seconds  -BB    Sharpened Rhomberg bilaterally 25 plus seconds  -BB    Transfers    Transfers, Sit-Stand [place] independent   Mild valgus collapse corrected with VCs  -BB    Gait Assessment/Treatment    Gait, Comment No increased pain with gait. No antalgics with fatigue  -BB    Stairs Assessment/Treatment    Stairs, Comment WNL with handrail  -BB      User Key  (r) = Recorded By, (t) = Taken By, (c) = Cosigned By    Initials Name Provider Type    ANA Alcantara PT Physical Therapist                            PT Assessment/Plan       03/22/17 1630       PT Assessment    Functional Limitations Performance in leisure activities  -BB     Impairments Impaired muscle endurance  -BB     Assessment Comments Patient has progressed with goals at this time. Continues to lack functional strength and endurance but strength is WFL and balance is good with no concerns noted. Minimal to no antalgics noted with gait. Patient taught gentle soft tissue message to the hip  "flexor where tone is felt and was educated on importance of continued HEP. Patient is [employer] member and plans to continue to use membership. Patient agrees with POC to be DC at this time.   -BB     Please refer to paper survey for additional self-reported information Yes  -BB     Rehab Potential Good  -BB     Patient/caregiver participated in establishment of treatment plan and goals Yes  -BB     Patient would benefit from skilled therapy intervention No   Patient DC at this time.   -BB     PT Plan    Predicted Duration of Therapy Intervention (days/wks) Discharge to independence with HEP  -BB     PT Plan Comments Discharge to independence with HEP  -BB       User Key  (r) = Recorded By, (t) = Taken By, (c) = Cosigned By    Initials Name Provider Type    BB Alma Alcantara, PT Physical Therapist                    Exercises       03/22/17 1700 03/22/17 1630       Subjective Comments    Subjective Comments  Patient reports doing well. Denies any medication changes. Patient reports the primary limitation is the \"catch\" felt in the first 2 steps and that is the only time it is noted. Reports walking from apartment to bank without trouble, goes up and down stairs with use of handrail without complaints. Reports the catch felt in the hip is the same that was felt prior to surgery. Patient was encouraged to inform MD of the \"catch\". Reports squatting down recently for first time and had to use furniture to get upright again.  -BB     Subjective Pain    Able to rate subjective pain?  yes  -BB     Pre-Treatment Pain Level  0  -BB     Post-Treatment Pain Level  0  -BB     Exercise 1    Exercise Name 1  Pro 2 Level 4  -BB     Time (Minutes) 1 '  -BB 10'  -BB     Exercise 2    Exercise Name 2  Eccentric step down   -BB     Sets 2  1  -BB     Reps 2  10  -BB     Exercise 3    Exercise Name 3  sit to stand   -BB     Reps 3  15  -BB     Exercise 4    Exercise Name 4  SLR   -BB     Sets 4  1  -BB     Reps 4  10  -BB     " Exercise 5    Exercise Name 5  SLS bilaterally   -BB     Time (Minutes) 5  1.5'  -BB     Exercise 6    Exercise Name 6  Tandem balance   -BB     Time (Minutes) 6  1.5'  -BB     Exercise 7    Exercise Name 7  6' walk test   -BB     Time (Minutes) 7  6'  -BB       User Key  (r) = Recorded By, (t) = Taken By, (c) = Cosigned By    Initials Name Provider Type    BB Alma Alcantara PT Physical Therapist                               PT OP Goals       03/22/17 1630       PT Short Term Goals    STG Date to Achieve 02/20/17  -BB     STG 1 Independent in HEP   -BB     STG 1 Progress Met  -BB     STG 2 SLR x10 no lag  -BB     STG 2 Progress Met  -BB     STG 3 Sit to stand no UE assist  -BB     STG 3 Progress Met  -BB     Long Term Goals    LTG Date to Achieve 03/13/17  -BB     LTG 1 Independent in final HEP   -BB     LTG 1 Progress Met  -BB     LTG 2 SLS RLE 5 seconds level ground with eyes open  -BB     LTG 2 Progress Met  -BB     LTG 3 Hip flexion 5/5  -BB     LTG 3 Progress Not Met  -BB     LTG 3 Progress Comments 4/5  -BB     LTG 4 Hip abduction 5/5  -BB     LTG 4 Progress Not Met  -BB     LTG 4 Progress Comments 4/5  -BB     LTG 5 No increase in pain with 6 minute walk test  -BB     LTG 5 Progress Met  -BB       User Key  (r) = Recorded By, (t) = Taken By, (c) = Cosigned By    Initials Name Provider Type    ANA Alcantara PT Physical Therapist                Therapy Education       03/22/17 1630          Therapy Education    Given HEP;Posture/body mechanics;Pain management;Symptoms/condition management   educated on soft tissue to hip flexor, HEP review  -BB      Program New  -BB      How Provided Verbal;Demonstration  -BB      Provided to Patient  -BB      Level of Understanding Verbalized  -BB        User Key  (r) = Recorded By, (t) = Taken By, (c) = Cosigned By    Initials Name Provider Type    ANA Alcantara PT Physical Therapist                Outcome Measures       03/22/17 1630          Lower Extremity  Functional Index    Any of your usual work, housework or school activities 3  -BB      Your usual hobbies, recreational or sporting activities 3  -BB      Getting into or out of the bath 3  -BB      Walking between rooms 4  -BB      Putting on your shoes or socks 2  -BB      Squatting 2  -BB      Lifting an object, like a bag of groceries from the floor 2  -BB      Performing light activities around your home 3  -BB      Performing heavy activities around your home 2  -BB      Getting into or out of a car 4  -BB      Walking 2 blocks 3  -BB      Walking a mile 3  -BB      Going up or down 10 stairs (about 1 flight of stairs) 3  -BB      Standing for 1 hour 3  -BB      Sitting for 1 hour 2  -BB      Running on even ground 1  -BB      Running on uneven ground 1  -BB      Making sharp turns while running fast 1  -BB      Hopping 1  -BB      Rolling over in bed 3  -BB      Total 49  -BB      Functional Assessment    Outcome Measure Options Lower Extremity Functional Scale (LEFS)  -BB        User Key  (r) = Recorded By, (t) = Taken By, (c) = Cosigned By    Initials Name Provider Type    ANA Alcantara PT Physical Therapist            Time Calculation:   Start Time: 1602  Stop Time: 1650  Time Calculation (min): 48 min  Total Timed Code Minutes- PT: 33 minute(s)    Therapy Charges for Today     Code Description Service Date Service Provider Modifiers Qty    85456153145 HC PT MOBILITY PROJECTED 3/22/2017 Alma Alcantara, PT GP, CI 1    68642249855 HC PT MOBILITY DISCHARGE 3/22/2017 Alma Alcantara, PT GP, CI 1    70908459557 HC PT THER PROC EA 15 MIN 3/22/2017 Alma Alcantara, PT GP 2    10406703125 HC PT THER SUPP EA 15 MIN 3/22/2017 Alma Alcantara, PT GP 1          PT G-Codes  PT Professional Judgement Used?: Yes  Outcome Measure Options: Lower Extremity Functional Scale (LEFS)  Score: 49/80  Functional Limitation: Mobility: Walking and moving around  Mobility: Walking and Moving Around Goal Status (): At least 1  percent but less than 20 percent impaired, limited or restricted  Mobility: Walking and Moving Around Discharge Status (): At least 1 percent but less than 20 percent impaired, limited or restricted     OP PT Discharge Summary  Date of Discharge: 03/22/17  Reason for Discharge: Independent  Outcomes Achieved: Patient able to partially acheive established goals  Discharge Destination: Home with home program  Discharge Instructions: Return/ask questions PRN. Continue with HEP      Alma Alcantara, PT  3/22/2017

## 2017-03-27 ENCOUNTER — APPOINTMENT (OUTPATIENT)
Dept: PHYSICAL THERAPY | Facility: HOSPITAL | Age: 67
End: 2017-03-27

## 2017-03-29 ENCOUNTER — APPOINTMENT (OUTPATIENT)
Dept: PHYSICAL THERAPY | Facility: HOSPITAL | Age: 67
End: 2017-03-29

## 2017-05-10 DIAGNOSIS — Z12.31 ENCOUNTER FOR SCREENING MAMMOGRAM FOR MALIGNANT NEOPLASM OF BREAST: Primary | ICD-10-CM

## 2017-09-28 ENCOUNTER — OFFICE VISIT (OUTPATIENT)
Dept: FAMILY MEDICINE CLINIC | Facility: CLINIC | Age: 67
End: 2017-09-28

## 2017-09-28 VITALS
BODY MASS INDEX: 20.61 KG/M2 | OXYGEN SATURATION: 98 % | WEIGHT: 116.3 LBS | DIASTOLIC BLOOD PRESSURE: 70 MMHG | HEART RATE: 81 BPM | SYSTOLIC BLOOD PRESSURE: 100 MMHG | HEIGHT: 63 IN

## 2017-09-28 DIAGNOSIS — Z12.31 ENCOUNTER FOR SCREENING MAMMOGRAM FOR BREAST CANCER: ICD-10-CM

## 2017-09-28 DIAGNOSIS — E78.2 MIXED HYPERLIPIDEMIA: ICD-10-CM

## 2017-09-28 DIAGNOSIS — N39.0 UTI (URINARY TRACT INFECTION), UNCOMPLICATED: ICD-10-CM

## 2017-09-28 DIAGNOSIS — Z11.59 NEED FOR HEPATITIS C SCREENING TEST: ICD-10-CM

## 2017-09-28 DIAGNOSIS — Z23 NEED FOR VACCINATION: ICD-10-CM

## 2017-09-28 DIAGNOSIS — Z00.00 MEDICARE ANNUAL WELLNESS VISIT, INITIAL: Primary | ICD-10-CM

## 2017-09-28 DIAGNOSIS — Z13.820 ENCOUNTER FOR SCREENING FOR OSTEOPOROSIS: ICD-10-CM

## 2017-09-28 PROCEDURE — G0438 PPPS, INITIAL VISIT: HCPCS | Performed by: GENERAL PRACTICE

## 2017-09-28 PROCEDURE — 90662 IIV NO PRSV INCREASED AG IM: CPT | Performed by: GENERAL PRACTICE

## 2017-09-28 PROCEDURE — 99213 OFFICE O/P EST LOW 20 MIN: CPT | Performed by: GENERAL PRACTICE

## 2017-09-28 PROCEDURE — G0008 ADMIN INFLUENZA VIRUS VAC: HCPCS | Performed by: GENERAL PRACTICE

## 2017-10-19 PROCEDURE — 87077 CULTURE AEROBIC IDENTIFY: CPT | Performed by: NURSE PRACTITIONER

## 2017-10-19 PROCEDURE — 87086 URINE CULTURE/COLONY COUNT: CPT | Performed by: NURSE PRACTITIONER

## 2017-10-19 PROCEDURE — 87186 SC STD MICRODIL/AGAR DIL: CPT | Performed by: NURSE PRACTITIONER

## 2017-10-26 ENCOUNTER — CLINICAL SUPPORT (OUTPATIENT)
Dept: AUDIOLOGY | Facility: CLINIC | Age: 67
End: 2017-10-26

## 2017-10-26 DIAGNOSIS — H90.3 SENSORINEURAL HEARING LOSS, BILATERAL: Primary | ICD-10-CM

## 2017-10-26 NOTE — PROGRESS NOTES
STANDARD AUDIOMETRIC EVALUATION      Name:  Kalli Lowe  :  1950  Age:  67 y.o.  Date of Evaluation:  10/26/2017      HISTORY    Reason for visit:  Kalli Lowe is seen today for a hearing evaluation at the request of Dr. Mesfin Kline.  Patient reports that she has been taking ear drops.  She reports trouble hearing her .  She reports bilateral tinnitus and a history of ear infections.      EVALUATION    See Audiogram    RESULTS        Otoscopy and Tympanometry 226 Hz :  Right Ear:  Otoscopy:  Clear ear canal          Tympanometry:  Middle ear function within normal limits    Left Ear:   Otoscopy:  Clear ear canal        Tympanometry:  Middle ear function within normal limits    Test technique:  Standard Audiometry     Pure Tone Audiometry:   Patient responded to pure tones at 25-50 dB for 250-8000 Hz in right ear, and at 30-70 dB for 250-8000 Hz in left ear.       Speech Audiometry:        Right Ear:  Speech Reception Threshold (SRT) was obtained at 30 dBHL                 Speech Discrimination scores were 100% in quiet when words were presented at 70 dBHL       Left Ear:  Speech Reception Threshold (SRT) was obtained at 30 dBHL                 Speech Discrimination scores were 92% in quiet when words were presented at 70 dBHL    Reliability:   good    IMPRESSIONS:  1.  Tympanometry results are consistent with Middle ear function within normal limits in both ears.  2.  Pure tone results are consistent with borderline normal to moderate sloping sensorineural hearing loss for right ear, and borderline normal to moderately-severe sloping sensorineural hearing loss in left ear.       RECOMMENDATIONS:  Patient is seeing the Ear Nose and Throat physician immediately following this examination.  It was a pleasure seeing Kalli Lowe in Audiology today.  We would be happy to do further testing or discuss these test as necessary.          This document has been electronically  signed by PAT Gaxiola on October 26, 2017 3:26 PM          PAT Gaxiola  Licensed Audiologist

## 2017-10-27 ENCOUNTER — OFFICE VISIT (OUTPATIENT)
Dept: OTOLARYNGOLOGY | Facility: CLINIC | Age: 67
End: 2017-10-27

## 2017-10-27 VITALS — TEMPERATURE: 97.5 F | WEIGHT: 113 LBS | HEIGHT: 63 IN | BODY MASS INDEX: 20.02 KG/M2

## 2017-10-27 DIAGNOSIS — H90.3 SENSORINEURAL HEARING LOSS (SNHL) OF BOTH EARS: ICD-10-CM

## 2017-10-27 DIAGNOSIS — H93.13 TINNITUS OF BOTH EARS: Primary | ICD-10-CM

## 2017-10-27 DIAGNOSIS — H60.392: ICD-10-CM

## 2017-10-27 PROCEDURE — 99213 OFFICE O/P EST LOW 20 MIN: CPT | Performed by: OTOLARYNGOLOGY

## 2017-10-27 NOTE — PROGRESS NOTES
Subjective   Kalli Lowe is a 67 y.o. female.   CC: hearing loss  History of Present Illness   Patient has a history of hearing loss and trouble especially understanding her .  She's had some itching or recent was placed as an antibiotic drops resolved.  Is mainly on the left side.  She has long-standing tinnitus that is not that bothersome to her .  She has a known history of nasal obstruction      The following portions of the patient's history were reviewed and updated as appropriate: allergies, current medications, past family history, past medical history, past social history, past surgical history and problem list.      Kalli Lowe reports that she has never smoked. She has never used smokeless tobacco. She reports that she does not drink alcohol or use illicit drugs.  Patient is not a tobacco user and has not been counseled for use of tobacco products    Family History   Problem Relation Age of Onset   • Cancer Other      malignant neoplasm of gastrointestinal tract      • Colon cancer Other    • Other Other        Hematologic disorder   • Arthritis Father    • Cancer Mother    • Colon cancer Mother        No current outpatient prescriptions on file.    Allergies   Allergen Reactions   • Contrast Dye Nausea And Vomiting       Past Medical History:   Diagnosis Date   • Benign paroxysmal positional vertigo    • Breast lump     left breast benign on u/s guided mammotone bx      • Contact dermatitis     olecranon area from chair material?     • Cough    • Encounter for long-term (current) use of medications    • GERD (gastroesophageal reflux disease)    • H/O screening mammography    • Hip pain    • History of Holter monitoring 11/19/2013    Overall rhythm sinus rhythm with average heart rate of 73 bpm, min 49 , max 111. Frequent isolated with frequent bigeminal cycles, total of 491 bigeminal cycles. No couplets of runs of ventricular tachycardia. Rare isolated PACs, total of 25.   •  History of urinary disorder    • Large ovary    • Myalgia    • Osteoarthritis    • Osteopenia    • Otalgia of left ear     resolved spontaneously      • Palpitations    • Postmenopausal state    • Screening for malignant neoplasm of breast      left breast benign mammotone bx      • Screening for osteoporosis    • Vertigo     resolved         Review of Systems   HENT: Positive for congestion, hearing loss and tinnitus.    Musculoskeletal: Positive for arthralgias.   Hematological: Positive for adenopathy.   All other systems reviewed and are negative.          Objective   Physical Exam   Constitutional: She is oriented to person, place, and time. She appears well-developed and well-nourished.   HENT:   Head: Normocephalic and atraumatic.   Right Ear: Hearing, tympanic membrane, external ear and ear canal normal.   Left Ear: Hearing, tympanic membrane, external ear and ear canal normal.   Ears:    Nose: Septal deviation present. No mucosal edema, rhinorrhea or nasal deformity. No epistaxis. Right sinus exhibits no maxillary sinus tenderness and no frontal sinus tenderness. Left sinus exhibits no maxillary sinus tenderness and no frontal sinus tenderness.       Mouth/Throat: Uvula is midline, oropharynx is clear and moist and mucous membranes are normal. No trismus in the jaw. Normal dentition. No oropharyngeal exudate or posterior oropharyngeal edema. Tonsils are 0 on the right. Tonsils are 0 on the left. No tonsillar exudate.   Eyes: Conjunctivae are normal.   Neck: Normal range of motion. Neck supple. No JVD present. No tracheal deviation present. No thyromegaly present.   Cardiovascular: Normal rate.    Pulmonary/Chest: Effort normal.   Musculoskeletal: Normal range of motion.   Lymphadenopathy:        Head (right side): No submental, no submandibular, no tonsillar, no preauricular, no posterior auricular and no occipital adenopathy present.        Head (left side): No submental, no submandibular, no tonsillar, no  preauricular, no posterior auricular and no occipital adenopathy present.     She has no cervical adenopathy.        Right cervical: No superficial cervical, no deep cervical and no posterior cervical adenopathy present.       Left cervical: No superficial cervical, no deep cervical and no posterior cervical adenopathy present.   Neurological: She is alert and oriented to person, place, and time. No cranial nerve deficit.   Skin: Skin is warm.   Psychiatric: She has a normal mood and affect. Her speech is normal and behavior is normal. Thought content normal.   Nursing note and vitals reviewed.    The actual audiogram and tympanogram tracings were reviewed with the patient showing normal tympanic membrane function but a mild hearing loss worsening high frequencies is consistent with her family history and tinnitus        Assessment/Plan   Kalli was seen today for hearing loss.    Diagnoses and all orders for this visit:    Tinnitus of both ears    Sensorineural hearing loss (SNHL) of both ears    Otitis externa, chronic infective, left      I discussed use of VoSoL over-the-counter in her ear to prevent ear infections   She isnot interested in hearing aid   Discussed follow-up in 1 year or sooner if worsening tinnitus is not so bothersome  She does not want anything done about the septal deformity is not as bad to cause symptoms

## 2017-12-13 ENCOUNTER — TELEPHONE (OUTPATIENT)
Dept: FAMILY MEDICINE CLINIC | Facility: CLINIC | Age: 67
End: 2017-12-13

## 2017-12-13 ENCOUNTER — LAB (OUTPATIENT)
Dept: LAB | Facility: HOSPITAL | Age: 67
End: 2017-12-13

## 2017-12-13 DIAGNOSIS — R30.9 PAIN PASSING URINE: Primary | ICD-10-CM

## 2017-12-13 DIAGNOSIS — R30.9 PAIN PASSING URINE: ICD-10-CM

## 2017-12-13 LAB
BACTERIA UR QL AUTO: ABNORMAL /HPF
BILIRUB UR QL STRIP: NEGATIVE
CLARITY UR: ABNORMAL
COLOR UR: YELLOW
GLUCOSE UR STRIP-MCNC: NEGATIVE MG/DL
HGB UR QL STRIP.AUTO: ABNORMAL
HYALINE CASTS UR QL AUTO: ABNORMAL /LPF
KETONES UR QL STRIP: NEGATIVE
LEUKOCYTE ESTERASE UR QL STRIP.AUTO: ABNORMAL
NITRITE UR QL STRIP: NEGATIVE
PH UR STRIP.AUTO: 6.5 [PH] (ref 5–9)
PROT UR QL STRIP: NEGATIVE
RBC # UR: ABNORMAL /HPF
REF LAB TEST METHOD: ABNORMAL
SP GR UR STRIP: 1.01 (ref 1–1.03)
SQUAMOUS #/AREA URNS HPF: ABNORMAL /HPF
UROBILINOGEN UR QL STRIP: ABNORMAL
WBC CLUMPS # UR AUTO: ABNORMAL /HPF
WBC UR QL AUTO: ABNORMAL /HPF

## 2017-12-13 PROCEDURE — 87186 SC STD MICRODIL/AGAR DIL: CPT

## 2017-12-13 PROCEDURE — 87077 CULTURE AEROBIC IDENTIFY: CPT

## 2017-12-13 PROCEDURE — 87086 URINE CULTURE/COLONY COUNT: CPT

## 2017-12-13 PROCEDURE — 81001 URINALYSIS AUTO W/SCOPE: CPT

## 2017-12-13 RX ORDER — SULFAMETHOXAZOLE AND TRIMETHOPRIM 800; 160 MG/1; MG/1
1 TABLET ORAL 2 TIMES DAILY
Qty: 14 TABLET | Refills: 0 | Status: SHIPPED | OUTPATIENT
Start: 2017-12-13 | End: 2017-12-20

## 2017-12-15 LAB
BACTERIA SPEC AEROBE CULT: ABNORMAL
BACTERIA SPEC AEROBE CULT: ABNORMAL

## 2017-12-15 RX ORDER — CIPROFLOXACIN 500 MG/1
500 TABLET, FILM COATED ORAL 2 TIMES DAILY
Qty: 20 TABLET | Refills: 0 | Status: SHIPPED | OUTPATIENT
Start: 2017-12-15 | End: 2018-02-16

## 2018-01-05 ENCOUNTER — INFUSION (OUTPATIENT)
Dept: ONCOLOGY | Facility: HOSPITAL | Age: 68
End: 2018-01-05

## 2018-01-05 DIAGNOSIS — M85.80 OSTEOPENIA, UNSPECIFIED LOCATION: Primary | ICD-10-CM

## 2018-01-05 DIAGNOSIS — M19.90 OSTEOARTHRITIS, UNSPECIFIED OSTEOARTHRITIS TYPE, UNSPECIFIED SITE: ICD-10-CM

## 2018-01-05 LAB
ALBUMIN SERPL-MCNC: 4.6 G/DL (ref 3.4–4.8)
ALBUMIN/GLOB SERPL: 1.5 G/DL (ref 1.1–1.8)
ALP SERPL-CCNC: 86 U/L (ref 38–126)
ALT SERPL W P-5'-P-CCNC: 37 U/L (ref 9–52)
ANION GAP SERPL CALCULATED.3IONS-SCNC: 11 MMOL/L (ref 5–15)
AST SERPL-CCNC: 31 U/L (ref 14–36)
BILIRUB SERPL-MCNC: 0.6 MG/DL (ref 0.2–1.3)
BUN BLD-MCNC: 18 MG/DL (ref 7–21)
BUN/CREAT SERPL: 21.2 (ref 7–25)
CALCIUM SPEC-SCNC: 9.7 MG/DL (ref 8.4–10.2)
CHLORIDE SERPL-SCNC: 104 MMOL/L (ref 95–110)
CO2 SERPL-SCNC: 26 MMOL/L (ref 22–31)
CREAT BLD-MCNC: 0.85 MG/DL (ref 0.5–1)
GFR SERPL CREATININE-BSD FRML MDRD: 66 ML/MIN/1.73 (ref 45–104)
GLOBULIN UR ELPH-MCNC: 3.1 GM/DL (ref 2.3–3.5)
GLUCOSE BLD-MCNC: 79 MG/DL (ref 60–100)
MAGNESIUM SERPL-MCNC: 2.1 MG/DL (ref 1.6–2.3)
PHOSPHATE SERPL-MCNC: 3.7 MG/DL (ref 2.4–4.4)
POTASSIUM BLD-SCNC: 4 MMOL/L (ref 3.5–5.1)
PROT SERPL-MCNC: 7.7 G/DL (ref 6.3–8.6)
SODIUM BLD-SCNC: 141 MMOL/L (ref 137–145)

## 2018-01-05 PROCEDURE — 84100 ASSAY OF PHOSPHORUS: CPT

## 2018-01-05 PROCEDURE — 83735 ASSAY OF MAGNESIUM: CPT

## 2018-01-05 PROCEDURE — 80053 COMPREHEN METABOLIC PANEL: CPT

## 2018-01-05 PROCEDURE — 25010000002 DENOSUMAB 60 MG/ML SOLUTION: Performed by: GENERAL PRACTICE

## 2018-01-05 PROCEDURE — 96372 THER/PROPH/DIAG INJ SC/IM: CPT | Performed by: NURSE PRACTITIONER

## 2018-01-05 RX ADMIN — DENOSUMAB 60 MG: 60 INJECTION SUBCUTANEOUS at 12:01

## 2018-01-05 NOTE — PATIENT INSTRUCTIONS
Denosumab injection  What is this medicine?  DENOSUMAB (den oh jakob mab) slows bone breakdown. Prolia is used to treat osteoporosis in women after menopause and in men. Xgeva is used to prevent bone fractures and other bone problems caused by cancer bone metastases. Xgeva is also used to treat giant cell tumor of the bone.  This medicine may be used for other purposes; ask your health care provider or pharmacist if you have questions.  COMMON BRAND NAME(S): Prolia, XGEVA  What should I tell my health care provider before I take this medicine?  They need to know if you have any of these conditions:  -dental disease  -eczema  -infection or history of infections  -kidney disease or on dialysis  -low blood calcium or vitamin D  -malabsorption syndrome  -scheduled to have surgery or tooth extraction  -taking medicine that contains denosumab  -thyroid or parathyroid disease  -an unusual reaction to denosumab, other medicines, foods, dyes, or preservatives  -pregnant or trying to get pregnant  -breast-feeding  How should I use this medicine?  This medicine is for injection under the skin. It is given by a health care professional in a hospital or clinic setting.  If you are getting Prolia, a special MedGuide will be given to you by the pharmacist with each prescription and refill. Be sure to read this information carefully each time.  For Prolia, talk to your pediatrician regarding the use of this medicine in children. Special care may be needed. For Xgeva, talk to your pediatrician regarding the use of this medicine in children. While this drug may be prescribed for children as young as 13 years for selected conditions, precautions do apply.  Overdosage: If you think you have taken too much of this medicine contact a poison control center or emergency room at once.  NOTE: This medicine is only for you. Do not share this medicine with others.  What if I miss a dose?  It is important not to miss your dose. Call your doctor  or health care professional if you are unable to keep an appointment.  What may interact with this medicine?  Do not take this medicine with any of the following medications:  -other medicines containing denosumab  This medicine may also interact with the following medications:  -medicines that suppress the immune system  -medicines that treat cancer  -steroid medicines like prednisone or cortisone  This list may not describe all possible interactions. Give your health care provider a list of all the medicines, herbs, non-prescription drugs, or dietary supplements you use. Also tell them if you smoke, drink alcohol, or use illegal drugs. Some items may interact with your medicine.  What should I watch for while using this medicine?  Visit your doctor or health care professional for regular checks on your progress. Your doctor or health care professional may order blood tests and other tests to see how you are doing.  Call your doctor or health care professional if you get a cold or other infection while receiving this medicine. Do not treat yourself. This medicine may decrease your body's ability to fight infection.  You should make sure you get enough calcium and vitamin D while you are taking this medicine, unless your doctor tells you not to. Discuss the foods you eat and the vitamins you take with your health care professional.  See your dentist regularly. Brush and floss your teeth as directed. Before you have any dental work done, tell your dentist you are receiving this medicine.  Do not become pregnant while taking this medicine or for 5 months after stopping it. Women should inform their doctor if they wish to become pregnant or think they might be pregnant. There is a potential for serious side effects to an unborn child. Talk to your health care professional or pharmacist for more information.  What side effects may I notice from receiving this medicine?  Side effects that you should report to your doctor  or health care professional as soon as possible:  -allergic reactions like skin rash, itching or hives, swelling of the face, lips, or tongue  -breathing problems  -chest pain  -fast, irregular heartbeat  -feeling faint or lightheaded, falls  -fever, chills, or any other sign of infection  -muscle spasms, tightening, or twitches  -numbness or tingling  -skin blisters or bumps, or is dry, peels, or red  -slow healing or unexplained pain in the mouth or jaw  -unusual bleeding or bruising  Side effects that usually do not require medical attention (report to your doctor or health care professional if they continue or are bothersome):  -muscle pain  -stomach upset, gas  This list may not describe all possible side effects. Call your doctor for medical advice about side effects. You may report side effects to FDA at 8-340-FDA-0794.  Where should I keep my medicine?  This medicine is only given in a clinic, doctor's office, or other health care setting and will not be stored at home.  NOTE: This sheet is a summary. It may not cover all possible information. If you have questions about this medicine, talk to your doctor, pharmacist, or health care provider.     © 2017, [employer]. (2017-01-19 10:06:55)

## 2018-02-16 PROCEDURE — 87186 SC STD MICRODIL/AGAR DIL: CPT | Performed by: NURSE PRACTITIONER

## 2018-02-16 PROCEDURE — 87077 CULTURE AEROBIC IDENTIFY: CPT | Performed by: NURSE PRACTITIONER

## 2018-02-16 PROCEDURE — 87086 URINE CULTURE/COLONY COUNT: CPT | Performed by: NURSE PRACTITIONER

## 2018-07-06 ENCOUNTER — INFUSION (OUTPATIENT)
Dept: ONCOLOGY | Facility: HOSPITAL | Age: 68
End: 2018-07-06

## 2018-07-06 DIAGNOSIS — M19.90 OSTEOARTHRITIS, UNSPECIFIED OSTEOARTHRITIS TYPE, UNSPECIFIED SITE: Primary | ICD-10-CM

## 2018-07-06 DIAGNOSIS — M19.90 OSTEOARTHRITIS, UNSPECIFIED OSTEOARTHRITIS TYPE, UNSPECIFIED SITE: ICD-10-CM

## 2018-07-06 DIAGNOSIS — M85.80 OSTEOPENIA, UNSPECIFIED LOCATION: ICD-10-CM

## 2018-07-06 LAB
ALBUMIN SERPL-MCNC: 4.3 G/DL (ref 3.4–4.8)
ALBUMIN/GLOB SERPL: 1.4 G/DL (ref 1.1–1.8)
ALP SERPL-CCNC: 82 U/L (ref 38–126)
ALT SERPL W P-5'-P-CCNC: 25 U/L (ref 9–52)
ANION GAP SERPL CALCULATED.3IONS-SCNC: 10 MMOL/L (ref 5–15)
AST SERPL-CCNC: 29 U/L (ref 14–36)
BILIRUB SERPL-MCNC: 0.6 MG/DL (ref 0.2–1.3)
BUN BLD-MCNC: 16 MG/DL (ref 7–21)
BUN/CREAT SERPL: 20.3 (ref 7–25)
CALCIUM SPEC-SCNC: 8.6 MG/DL (ref 8.4–10.2)
CHLORIDE SERPL-SCNC: 107 MMOL/L (ref 95–110)
CO2 SERPL-SCNC: 25 MMOL/L (ref 22–31)
CREAT BLD-MCNC: 0.79 MG/DL (ref 0.5–1)
GFR SERPL CREATININE-BSD FRML MDRD: 72 ML/MIN/1.73 (ref 39–90)
GLOBULIN UR ELPH-MCNC: 3 GM/DL (ref 2.3–3.5)
GLUCOSE BLD-MCNC: 110 MG/DL (ref 60–100)
MAGNESIUM SERPL-MCNC: 2.2 MG/DL (ref 1.6–2.3)
PHOSPHATE SERPL-MCNC: 2.6 MG/DL (ref 2.4–4.4)
POTASSIUM BLD-SCNC: 4.1 MMOL/L (ref 3.5–5.1)
PROT SERPL-MCNC: 7.3 G/DL (ref 6.3–8.6)
SODIUM BLD-SCNC: 142 MMOL/L (ref 137–145)

## 2018-07-06 PROCEDURE — 80053 COMPREHEN METABOLIC PANEL: CPT

## 2018-07-06 PROCEDURE — 36415 COLL VENOUS BLD VENIPUNCTURE: CPT | Performed by: NURSE PRACTITIONER

## 2018-07-06 PROCEDURE — 84100 ASSAY OF PHOSPHORUS: CPT

## 2018-07-06 PROCEDURE — 96372 THER/PROPH/DIAG INJ SC/IM: CPT | Performed by: NURSE PRACTITIONER

## 2018-07-06 PROCEDURE — 25010000002 DENOSUMAB 60 MG/ML SOLUTION: Performed by: GENERAL PRACTICE

## 2018-07-06 PROCEDURE — 83735 ASSAY OF MAGNESIUM: CPT

## 2018-07-06 RX ADMIN — DENOSUMAB 60 MG: 60 INJECTION SUBCUTANEOUS at 11:12

## 2018-10-04 ENCOUNTER — LAB (OUTPATIENT)
Dept: LAB | Facility: HOSPITAL | Age: 68
End: 2018-10-04

## 2018-10-04 DIAGNOSIS — Z00.00 MEDICARE ANNUAL WELLNESS VISIT, INITIAL: ICD-10-CM

## 2018-10-04 DIAGNOSIS — E78.2 MIXED HYPERLIPIDEMIA: ICD-10-CM

## 2018-10-04 DIAGNOSIS — N39.0 UTI (URINARY TRACT INFECTION), UNCOMPLICATED: ICD-10-CM

## 2018-10-04 LAB
BACTERIA UR QL AUTO: NORMAL /HPF
BILIRUB UR QL STRIP: NEGATIVE
CLARITY UR: CLEAR
COLOR UR: YELLOW
GLUCOSE UR STRIP-MCNC: NEGATIVE MG/DL
HGB UR QL STRIP.AUTO: NEGATIVE
HYALINE CASTS UR QL AUTO: NORMAL /LPF
KETONES UR QL STRIP: NEGATIVE
LEUKOCYTE ESTERASE UR QL STRIP.AUTO: ABNORMAL
NITRITE UR QL STRIP: NEGATIVE
PH UR STRIP.AUTO: 6 [PH] (ref 5–9)
PROT UR QL STRIP: NEGATIVE
RBC # UR: NORMAL /HPF
REF LAB TEST METHOD: NORMAL
SP GR UR STRIP: 1.01 (ref 1–1.03)
SQUAMOUS #/AREA URNS HPF: NORMAL /HPF
UROBILINOGEN UR QL STRIP: ABNORMAL
WBC UR QL AUTO: NORMAL /HPF

## 2018-10-04 PROCEDURE — 81001 URINALYSIS AUTO W/SCOPE: CPT

## 2018-10-05 ENCOUNTER — LAB (OUTPATIENT)
Dept: LAB | Facility: HOSPITAL | Age: 68
End: 2018-10-05

## 2018-10-05 DIAGNOSIS — E78.2 MIXED HYPERLIPIDEMIA: ICD-10-CM

## 2018-10-05 DIAGNOSIS — Z11.59 NEED FOR HEPATITIS C SCREENING TEST: ICD-10-CM

## 2018-10-05 LAB
ALBUMIN SERPL-MCNC: 4.2 G/DL (ref 3.4–4.8)
ALBUMIN/GLOB SERPL: 1.4 G/DL (ref 1.1–1.8)
ALP SERPL-CCNC: 80 U/L (ref 38–126)
ALT SERPL W P-5'-P-CCNC: 34 U/L (ref 9–52)
ANION GAP SERPL CALCULATED.3IONS-SCNC: 12 MMOL/L (ref 5–15)
ARTICHOKE IGE QN: 83 MG/DL (ref 1–129)
AST SERPL-CCNC: 43 U/L (ref 14–36)
BILIRUB SERPL-MCNC: 0.9 MG/DL (ref 0.2–1.3)
BUN BLD-MCNC: 16 MG/DL (ref 7–21)
BUN/CREAT SERPL: 17.6 (ref 7–25)
CALCIUM SPEC-SCNC: 9.2 MG/DL (ref 8.4–10.2)
CHLORIDE SERPL-SCNC: 101 MMOL/L (ref 95–110)
CHOLEST SERPL-MCNC: 183 MG/DL (ref 0–199)
CO2 SERPL-SCNC: 26 MMOL/L (ref 22–31)
CREAT BLD-MCNC: 0.91 MG/DL (ref 0.5–1)
GFR SERPL CREATININE-BSD FRML MDRD: 61 ML/MIN/1.73 (ref 39–90)
GLOBULIN UR ELPH-MCNC: 3.1 GM/DL (ref 2.3–3.5)
GLUCOSE BLD-MCNC: 96 MG/DL (ref 60–100)
HCV AB SER DONR QL: NEGATIVE
HDLC SERPL-MCNC: 67 MG/DL (ref 60–200)
LDLC/HDLC SERPL: 1.32 {RATIO} (ref 0–3.22)
POTASSIUM BLD-SCNC: 4.3 MMOL/L (ref 3.5–5.1)
PROT SERPL-MCNC: 7.3 G/DL (ref 6.3–8.6)
SODIUM BLD-SCNC: 139 MMOL/L (ref 137–145)
TRIGL SERPL-MCNC: 138 MG/DL (ref 20–199)

## 2018-10-05 PROCEDURE — 80061 LIPID PANEL: CPT

## 2018-10-05 PROCEDURE — 36415 COLL VENOUS BLD VENIPUNCTURE: CPT

## 2018-10-05 PROCEDURE — 80053 COMPREHEN METABOLIC PANEL: CPT

## 2018-10-05 PROCEDURE — 86803 HEPATITIS C AB TEST: CPT

## 2018-10-15 ENCOUNTER — LAB (OUTPATIENT)
Dept: LAB | Facility: HOSPITAL | Age: 68
End: 2018-10-15

## 2018-10-15 ENCOUNTER — OFFICE VISIT (OUTPATIENT)
Dept: FAMILY MEDICINE CLINIC | Facility: CLINIC | Age: 68
End: 2018-10-15

## 2018-10-15 VITALS
DIASTOLIC BLOOD PRESSURE: 70 MMHG | HEIGHT: 63 IN | SYSTOLIC BLOOD PRESSURE: 110 MMHG | WEIGHT: 118.3 LBS | BODY MASS INDEX: 20.96 KG/M2 | OXYGEN SATURATION: 99 % | HEART RATE: 93 BPM

## 2018-10-15 DIAGNOSIS — N39.0 UTI (URINARY TRACT INFECTION), UNCOMPLICATED: ICD-10-CM

## 2018-10-15 DIAGNOSIS — R25.9 PARKINSONIAN FEATURES: ICD-10-CM

## 2018-10-15 DIAGNOSIS — Z12.31 ENCOUNTER FOR SCREENING MAMMOGRAM FOR MALIGNANT NEOPLASM OF BREAST: Primary | ICD-10-CM

## 2018-10-15 DIAGNOSIS — Z00.00 MEDICARE ANNUAL WELLNESS VISIT, SUBSEQUENT: Primary | ICD-10-CM

## 2018-10-15 DIAGNOSIS — Z23 NEED FOR INFLUENZA VACCINATION: ICD-10-CM

## 2018-10-15 DIAGNOSIS — R25.8 BRADYKINESIA: ICD-10-CM

## 2018-10-15 DIAGNOSIS — E78.2 MIXED HYPERLIPIDEMIA: ICD-10-CM

## 2018-10-15 DIAGNOSIS — Z12.11 SCREENING FOR COLON CANCER: ICD-10-CM

## 2018-10-15 DIAGNOSIS — R25.1 TREMOR: ICD-10-CM

## 2018-10-15 LAB
BACTERIA UR QL AUTO: ABNORMAL /HPF
BILIRUB UR QL STRIP: NEGATIVE
CLARITY UR: ABNORMAL
COLOR UR: YELLOW
GLUCOSE UR STRIP-MCNC: NEGATIVE MG/DL
HGB UR QL STRIP.AUTO: NEGATIVE
HYALINE CASTS UR QL AUTO: ABNORMAL /LPF
KETONES UR QL STRIP: NEGATIVE
LEUKOCYTE ESTERASE UR QL STRIP.AUTO: ABNORMAL
NITRITE UR QL STRIP: NEGATIVE
PH UR STRIP.AUTO: 6 [PH] (ref 5–9)
PROT UR QL STRIP: NEGATIVE
RBC # UR: ABNORMAL /HPF
REF LAB TEST METHOD: ABNORMAL
SP GR UR STRIP: 1.02 (ref 1–1.03)
SQUAMOUS #/AREA URNS HPF: ABNORMAL /HPF
UROBILINOGEN UR QL STRIP: ABNORMAL
WBC UR QL AUTO: ABNORMAL /HPF

## 2018-10-15 PROCEDURE — 87086 URINE CULTURE/COLONY COUNT: CPT

## 2018-10-15 PROCEDURE — 87186 SC STD MICRODIL/AGAR DIL: CPT

## 2018-10-15 PROCEDURE — 81001 URINALYSIS AUTO W/SCOPE: CPT

## 2018-10-15 PROCEDURE — G0008 ADMIN INFLUENZA VIRUS VAC: HCPCS | Performed by: GENERAL PRACTICE

## 2018-10-15 PROCEDURE — G0439 PPPS, SUBSEQ VISIT: HCPCS | Performed by: GENERAL PRACTICE

## 2018-10-15 PROCEDURE — 90662 IIV NO PRSV INCREASED AG IM: CPT | Performed by: GENERAL PRACTICE

## 2018-10-15 PROCEDURE — 87077 CULTURE AEROBIC IDENTIFY: CPT

## 2018-10-15 PROCEDURE — 99214 OFFICE O/P EST MOD 30 MIN: CPT | Performed by: GENERAL PRACTICE

## 2018-10-15 RX ORDER — DOXYCYCLINE HYCLATE 100 MG/1
1 CAPSULE ORAL DAILY
COMMUNITY
Start: 2018-07-11

## 2018-10-15 NOTE — PATIENT INSTRUCTIONS
Check at pharmacy on Shingrix shingles vaccine     Medicare Wellness  Personal Prevention Plan of Service     Date of Office Visit:  10/15/2018  Encounter Provider:  Tiffanie Hidalgo MD  Place of Service:  [facility]  Patient Name: Kalli Lowe  :  1950    As part of the Medicare Wellness portion of your visit today, we are providing you with this personalized preventive plan of services (PPPS). This plan is based upon recommendations of the [place] Preventive Services Task Force (USPSTF) and the Advisory Committee on Immunization Practices (ACIP).    This lists the preventive care services that should be considered, and provides dates of when you are due. Items listed as completed are up-to-date and do not require any further intervention.    Health Maintenance   Topic Date Due   • ZOSTER VACCINE (2 of 2) 2017   • MEDICARE ANNUAL WELLNESS  2018   • COLONOSCOPY  2018   • TDAP/TD VACCINES (2 - Td) 2019   • LIPID PANEL  10/05/2019   • MAMMOGRAM  10/26/2019   • DXA SCAN  10/26/2019   • HEPATITIS C SCREENING  Completed   • INFLUENZA VACCINE  Completed   • PNEUMOCOCCAL VACCINES (65+ LOW/MEDIUM RISK)  Completed       Orders Placed This Encounter   Procedures   • Urine Culture - Urine, Urine, Clean Catch     Standing Status:   Future     Number of Occurrences:   1     Standing Expiration Date:   10/15/2019   • Flu Vaccine High Dose PF 65YR+ ([id number])   • Ambulatory Referral to General Surgery     Referral Priority:   Routine     Referral Type:   Consultation     Referral Reason:   Specialty Services Required     Referred to Provider:   Claudio Martell MD     Requested Specialty:   General Surgery     Number of Visits Requested:   1   • Ambulatory Referral to Neurology     Referral Priority:   Routine     Referral Type:   Consultation     Referral Reason:   Specialty Services Required     Referred to Provider:   Odette Lane MD      Requested Specialty:   Neurology     Number of Visits Requested:   1   • Urinalysis With Microscopic - Urine, Clean Catch     Standing Status:   Future     Number of Occurrences:   1     Standing Expiration Date:   10/15/2019       Return in about 1 year (around 10/29/2019) for Annual physical, medicare wellness visit.

## 2018-10-15 NOTE — PROGRESS NOTES
QUICK REFERENCE INFORMATION:  The ABCs of the Annual Wellness Visit    Subsequent Medicare Wellness Visit    HEALTH RISK ASSESSMENT    1950    Recent Hospitalizations:  No hospitalization(s) within the last year..        Current Medical Providers:  Patient Care Team:  Tiffanie Hidalgo MD as PCP - General  Julio, Miguel LEBRON MD as Surgeon (General Surgery)  Phenix, Mckinley MICHAUD MD as Consulting Physician (Urology)        Smoking Status:  History   Smoking Status   • Never Smoker   Smokeless Tobacco   • Never Used       Alcohol Consumption:  History   Alcohol Use No       Depression Screen:   PHQ-2/PHQ-9 Depression Screening 10/15/2018   Little interest or pleasure in doing things 0   Feeling down, depressed, or hopeless 0   Trouble falling or staying asleep, or sleeping too much 1   Feeling tired or having little energy 1   Poor appetite or overeating 0   Feeling bad about yourself - or that you are a failure or have let yourself or your family down 0   Trouble concentrating on things, such as reading the newspaper or watching television 0   Moving or speaking so slowly that other people could have noticed. Or the opposite - being so fidgety or restless that you have been moving around a lot more than usual 2   Thoughts that you would be better off dead, or of hurting yourself in some way 0   Total Score 4       Health Habits and Functional and Cognitive Screening:  Functional & Cognitive Status 10/15/2018   Do you have difficulty preparing food and eating? No   Do you have difficulty bathing yourself, getting dressed or grooming yourself? No   Do you have difficulty using the toilet? No   Do you have difficulty moving around from place to place? Yes   Do you have trouble with steps or getting out of a bed or a chair? Yes   In the past year have you fallen or experienced a near fall? No   Current Diet Well Balanced Diet   Dental Exam Up to date   Eye Exam Up to date   Exercise (times per week) 3 times per week    Current Exercise Activities Include Walking   Do you need help using the phone?  No   Are you deaf or do you have serious difficulty hearing?  No   Do you need help with transportation? No   Do you need help shopping? No   Do you need help preparing meals?  No   Do you need help with housework?  No   Do you need help with laundry? No   Do you need help taking your medications? No   Do you need help managing money? No   Do you ever drive or ride in a car without wearing a seat belt? No   Have you felt unusual stress, anger or loneliness in the last month? No   Who do you live with? Spouse   If you need help, do you have trouble finding someone available to you? No   Have you been bothered in the last four weeks by sexual problems? No   Do you have difficulty concentrating, remembering or making decisions? No           Does the patient have evidence of cognitive impairment? No    Aspirin use counseling: Does not need ASA (and currently is not on it)      Recent Lab Results:  CMP:  Lab Results   Component Value Date    BUN 16 10/05/2018    CREATININE 0.91 10/05/2018    EGFRIFNONA 61 10/05/2018    BCR 17.6 10/05/2018     10/05/2018    K 4.3 10/05/2018    CO2 26.0 10/05/2018    CALCIUM 9.2 10/05/2018    ALBUMIN 4.20 10/05/2018    BILITOT 0.9 10/05/2018    ALKPHOS 80 10/05/2018    AST 43 (H) 10/05/2018    ALT 34 10/05/2018     Lipid Panel:  Lab Results   Component Value Date    CHOL 183 10/05/2018    TRIG 138 10/05/2018    HDL 67 10/05/2018    LDLHDL 1.32 10/05/2018     HbA1c:       Visual Acuity:  No exam data present    Age-appropriate Screening Schedule:  Refer to the list below for future screening recommendations based on patient's age, sex and/or medical conditions. Orders for these recommended tests are listed in the plan section. The patient has been provided with a written plan.    Health Maintenance   Topic Date Due   • ZOSTER VACCINE (2 of 2) 11/23/2017   • COLONOSCOPY  11/12/2018   • TDAP/TD VACCINES  (2 - Td) 04/02/2019   • LIPID PANEL  10/05/2019   • MAMMOGRAM  10/26/2019   • DXA SCAN  10/26/2019   • INFLUENZA VACCINE  Completed   • PNEUMOCOCCAL VACCINES (65+ LOW/MEDIUM RISK)  Completed        Subjective   History of Present Illness    Kalli Lowe is a 68 y.o. female who presents for an Subsequent Wellness Visit.    The following portions of the patient's history were reviewed and updated as appropriate: allergies, current medications, past family history, past medical history, past social history, past surgical history and problem list.    No outpatient prescriptions prior to visit.     No facility-administered medications prior to visit.        Patient Active Problem List   Diagnosis   • Osteoarthritis   • Osteopenia   • Screen for colon cancer       Advance Care Planning:  power of  for healthcare on file, has an advance directive - a copy has been provided and is in file    Identification of Risk Factors:  Risk factors include: weight , unhealthy diet, inactivity and increased fall risk.    Review of Systems    Compared to one year ago, the patient feels her physical health is worse.  Compared to one year ago, the patient feels her mental health is worse.    Objective     Physical Exam   Constitutional: She is oriented to person, place, and time. She appears well-developed and well-nourished. No distress.   HENT:   Head: Normocephalic and atraumatic.   Nose: Nose normal.   Mouth/Throat: Oropharynx is clear and moist.   Eyes: Pupils are equal, round, and reactive to light. Conjunctivae and EOM are normal. Right eye exhibits no discharge. Left eye exhibits no discharge.   Neck: No thyromegaly present.   Cardiovascular: Normal rate, regular rhythm, normal heart sounds and intact distal pulses.    Pulmonary/Chest: Effort normal and breath sounds normal.   Lymphadenopathy:     She has no cervical adenopathy.   Neurological: She is alert and oriented to person, place, and time.   Skin: Skin is  "warm and dry.   Psychiatric: She has a normal mood and affect.   Nursing note and vitals reviewed.      Vitals:    10/15/18 0941   BP: 110/70   Pulse: 93   SpO2: 99%   Weight: 53.7 kg (118 lb 4.8 oz)   Height: 158.8 cm (62.5\")       Patient's Body mass index is 21.29 kg/m². BMI is within normal parameters. No follow-up required.      Assessment/Plan   Patient Self-Management and Personalized Health Advice  The patient has been provided with information about: diet, exercise, weight management, fall prevention and mental health concerns and preventive services including:   · Exercise counseling provided, Fall Risk assessment done, Fall Risk plan of care done, Influenza vaccine, Zostavax vaccine (Herpes Zoster).    Visit Diagnoses:    ICD-10-CM ICD-9-CM   1. Medicare annual wellness visit, subsequent Z00.00 V70.0   2. UTI (urinary tract infection), uncomplicated N39.0 599.0   3. Screening for colon cancer Z12.11 V76.51   4. Tremor R25.1 781.0   5. Bradykinesia R25.8 781.0   6. Parkinsonian features R25.9 781.0   7. Need for influenza vaccination Z23 V04.81   8. Mixed hyperlipidemia E78.2 272.2       Orders Placed This Encounter   Procedures   • Urine Culture - Urine, Urine, Clean Catch     Standing Status:   Future     Number of Occurrences:   1     Standing Expiration Date:   10/15/2019   • Flu Vaccine High Dose PF 65YR+ ([id number])   • Comprehensive Metabolic Panel     Standing Status:   Future     Standing Expiration Date:   11/29/2019   • Lipid Panel     Standing Status:   Future     Standing Expiration Date:   11/29/2019   • Ambulatory Referral to General Surgery     Referral Priority:   Routine     Referral Type:   Consultation     Referral Reason:   Specialty Services Required     Referred to Provider:   Claudio Martell MD     Requested Specialty:   General Surgery     Number of Visits Requested:   1   • Ambulatory Referral to Neurology     Referral Priority:   Routine     Referral Type:   Consultation     " Referral Reason:   Specialty Services Required     Referred to Provider:   Odette Lane MD     Requested Specialty:   Neurology     Number of Visits Requested:   1   • Urinalysis With Microscopic - Urine, Clean Catch     Standing Status:   Future     Number of Occurrences:   1     Standing Expiration Date:   10/15/2019   • Urinalysis With Microscopic - Urine, Clean Catch     Standing Status:   Future     Standing Expiration Date:   11/29/2019       Outpatient Encounter Prescriptions as of 10/15/2018   Medication Sig Dispense Refill   • doxycycline (VIBRAMYCIN) 100 MG capsule Take 1 capsule by mouth Daily.       No facility-administered encounter medications on file as of 10/15/2018.        Reviewed use of high risk medication in the elderly: not applicable  Reviewed for potential of harmful drug interactions in the elderly: not applicable    Follow Up:  Return in about 1 year (around 10/29/2019) for Annual physical, medicare wellness visit.     An After Visit Summary and PPPS with all of these plans were given to the patient.    Information has been scanned into chart.  Discussed importance of taking medications as prescribed. Encouraged healthy eating habits with low fat, low salt choices and working towards maintaining a healthy weight. Recommended regular exercise if able as well as care to prevent falls.

## 2018-10-15 NOTE — PROGRESS NOTES
Subjective   Kalli Lowe is a 68 y.o. female.     Chief Complaint   Patient presents with   • Annual Exam     labs [facility] medicare wellness       History of Present Illness     For review and evaluation of management of chronic medical problems. Has recurrent UTI's for which she takes low dose antibiotic. Missed some and thinks she has a UTI.  Is concerned that she might have Parkinson's disease and she does have a little bit of a tremor in her hand, also has found it difficult to move smoothly at times.    The following portions of the patient's history were reviewed and updated as appropriate: allergies, current medications, past family and social history and problem list.    No outpatient prescriptions prior to visit.     No facility-administered medications prior to visit.        Review of Systems   Constitutional: Negative.  Negative for chills, fatigue, fever and unexpected weight change.   HENT: Negative.  Negative for congestion, ear pain, hearing loss, nosebleeds, rhinorrhea, sneezing, sore throat and tinnitus.    Eyes: Negative.  Negative for discharge.   Respiratory: Negative.  Negative for cough, shortness of breath and wheezing.    Cardiovascular: Negative.  Negative for chest pain and palpitations.   Gastrointestinal: Negative.  Negative for abdominal pain, constipation, diarrhea, nausea and vomiting.   Endocrine: Negative.    Genitourinary: Positive for dysuria. Negative for frequency and urgency.   Musculoskeletal: Negative.  Negative for arthralgias, back pain, joint swelling, myalgias and neck pain.   Skin: Negative.  Negative for rash.   Allergic/Immunologic: Negative.    Neurological: Negative.  Negative for dizziness, weakness, numbness and headaches.   Hematological: Negative.  Negative for adenopathy.   Psychiatric/Behavioral: Negative.  Negative for dysphoric mood and sleep disturbance. The patient is not nervous/anxious.        Objective     Visit Vitals  /70   Pulse 93   Ht  "158.8 cm (62.5\")   Wt 53.7 kg (118 lb 4.8 oz)   SpO2 99%   BMI 21.29 kg/m²     Physical Exam   Constitutional: She is oriented to person, place, and time. She appears well-developed and well-nourished. No distress.   HENT:   Head: Normocephalic and atraumatic.   Nose: Nose normal.   Mouth/Throat: Oropharynx is clear and moist.   Eyes: Pupils are equal, round, and reactive to light. Conjunctivae and EOM are normal. Right eye exhibits no discharge. Left eye exhibits no discharge.   Neck: No tracheal deviation present. No thyromegaly present.   Cardiovascular: Normal rate, regular rhythm, normal heart sounds and intact distal pulses.    No murmur heard.  Pulmonary/Chest: Effort normal and breath sounds normal. No respiratory distress. She has no wheezes. She has no rales. She exhibits no tenderness. Right breast exhibits no inverted nipple, no mass, no nipple discharge, no skin change and no tenderness. Left breast exhibits no inverted nipple, no mass, no nipple discharge, no skin change and no tenderness.   Abdominal: Soft. Bowel sounds are normal. She exhibits no distension and no mass. There is no tenderness. No hernia.   Musculoskeletal: Normal range of motion. She exhibits no deformity.   Lymphadenopathy:     She has no cervical adenopathy.   Neurological: She is alert and oriented to person, place, and time. She has normal reflexes. She displays tremor (not seen today).   Skin: Skin is warm and dry.   Psychiatric: She has a normal mood and affect. Her behavior is normal. Judgment and thought content normal.   Nursing note and vitals reviewed.       Results for orders placed or performed in visit on 10/05/18   Comprehensive Metabolic Panel   Result Value Ref Range    Glucose 96 60 - 100 mg/dL    BUN 16 7 - 21 mg/dL    Creatinine 0.91 0.50 - 1.00 mg/dL    Sodium 139 137 - 145 mmol/L    Potassium 4.3 3.5 - 5.1 mmol/L    Chloride 101 95 - 110 mmol/L    CO2 26.0 22.0 - 31.0 mmol/L    Calcium 9.2 8.4 - 10.2 mg/dL    " Total Protein 7.3 6.3 - 8.6 g/dL    Albumin 4.20 3.40 - 4.80 g/dL    ALT (SGPT) 34 9 - 52 U/L    AST (SGOT) 43 (H) 14 - 36 U/L    Alkaline Phosphatase 80 38 - 126 U/L    Total Bilirubin 0.9 0.2 - 1.3 mg/dL    eGFR Non African Amer 61 39 - 90 mL/min/1.73    Globulin 3.1 2.3 - 3.5 gm/dL    A/G Ratio 1.4 1.1 - 1.8 g/dL    BUN/Creatinine Ratio 17.6 7.0 - 25.0    Anion Gap 12.0 5.0 - 15.0 mmol/L   Lipid Panel   Result Value Ref Range    Total Cholesterol 183 0 - 199 mg/dL    Triglycerides 138 20 - 199 mg/dL    HDL Cholesterol 67 60 - 200 mg/dL    LDL Cholesterol  83 1 - 129 mg/dL    LDL/HDL Ratio 1.32 0.00 - 3.22   Hepatitis C antibody   Result Value Ref Range    Hepatitis C Ab Negative Negative      Assessment/Plan   Problem List Items Addressed This Visit     None      Visit Diagnoses     Medicare annual wellness visit, subsequent    -  Primary    UTI (urinary tract infection), uncomplicated        Relevant Medications    doxycycline (VIBRAMYCIN) 100 MG capsule    Other Relevant Orders    Urine Culture - Urine, Urine, Clean Catch (Completed)    Urinalysis With Microscopic - Urine, Clean Catch (Completed)    Screening for colon cancer        Relevant Orders    Ambulatory Referral to General Surgery    Tremor        Relevant Orders    Ambulatory Referral to Neurology    Bradykinesia        Relevant Orders    Ambulatory Referral to Neurology    Parkinsonian features        Relevant Orders    Ambulatory Referral to Neurology    Need for influenza vaccination        Relevant Orders    Flu Vaccine High Dose PF 65YR+ ([id number]) (Completed)    Mixed hyperlipidemia        Relevant Orders    Comprehensive Metabolic Panel    Lipid Panel    Urinalysis With Microscopic - Urine, Clean Catch         Will notify regarding results.     No orders of the defined types were placed in this encounter.    Return in about 1 year (around 10/29/2019) for Annual physical, medicare wellness visit.

## 2018-10-17 ENCOUNTER — TELEPHONE (OUTPATIENT)
Dept: FAMILY MEDICINE CLINIC | Facility: CLINIC | Age: 68
End: 2018-10-17

## 2018-10-17 ENCOUNTER — PREP FOR SURGERY (OUTPATIENT)
Dept: OTHER | Facility: HOSPITAL | Age: 68
End: 2018-10-17

## 2018-10-17 DIAGNOSIS — Z12.11 SCREEN FOR COLON CANCER: Primary | ICD-10-CM

## 2018-10-17 LAB — BACTERIA SPEC AEROBE CULT: ABNORMAL

## 2018-10-17 RX ORDER — DEXTROSE AND SODIUM CHLORIDE 5; .45 G/100ML; G/100ML
100 INJECTION, SOLUTION INTRAVENOUS CONTINUOUS
Status: CANCELLED | OUTPATIENT
Start: 2018-11-16

## 2018-10-17 RX ORDER — NITROFURANTOIN 25; 75 MG/1; MG/1
100 CAPSULE ORAL EVERY 12 HOURS SCHEDULED
Qty: 14 CAPSULE | Refills: 0 | Status: SHIPPED | OUTPATIENT
Start: 2018-10-17 | End: 2018-11-01

## 2018-10-17 NOTE — PROGRESS NOTES
Pr Dr. Hidalgo, Ms. Lowe has been called with her recent lab results & recommendations.  Continue her current medications and follow-up as planned or sooner if any problems.    Script Sent to [place]

## 2018-11-01 ENCOUNTER — CLINICAL SUPPORT (OUTPATIENT)
Dept: AUDIOLOGY | Facility: CLINIC | Age: 68
End: 2018-11-01

## 2018-11-01 ENCOUNTER — OFFICE VISIT (OUTPATIENT)
Dept: OTOLARYNGOLOGY | Facility: CLINIC | Age: 68
End: 2018-11-01

## 2018-11-01 VITALS — BODY MASS INDEX: 20.73 KG/M2 | HEIGHT: 63 IN | WEIGHT: 117 LBS | TEMPERATURE: 97.4 F

## 2018-11-01 DIAGNOSIS — H90.3 SENSORINEURAL HEARING LOSS, BILATERAL: Primary | ICD-10-CM

## 2018-11-01 DIAGNOSIS — H93.13 TINNITUS OF BOTH EARS: ICD-10-CM

## 2018-11-01 DIAGNOSIS — H90.3 SENSORINEURAL HEARING LOSS (SNHL) OF BOTH EARS: Primary | ICD-10-CM

## 2018-11-01 PROCEDURE — 99213 OFFICE O/P EST LOW 20 MIN: CPT | Performed by: OTOLARYNGOLOGY

## 2018-11-01 NOTE — PATIENT INSTRUCTIONS

## 2018-11-01 NOTE — PROGRESS NOTES
Subjective   Kalli Lowe is a 68 y.o. female.   Both ears and tinnitus    History of Present Illness   Patient notes no change in hearing she is tolerating her tinnitus well she's had no ear  pain or discomfort no drainage from either ear   Has been diagnosed with possible Parkinson's disease  is seeing a neurologist the near term    The following portions of the patient's history were reviewed and updated as appropriate: allergies, current medications, past family history, past medical history, past social history, past surgical history and problem list.      Current Outpatient Prescriptions:   •  doxycycline (VIBRAMYCIN) 100 MG capsule, Take 1 capsule by mouth Daily., Disp: , Rfl:     Allergies   Allergen Reactions   • Contrast Dye Nausea And Vomiting             Review of Systems   Constitutional: Negative for fever.   HENT: Positive for hearing loss and tinnitus. Negative for ear pain.    Neurological: Negative for dizziness.   Hematological: Negative for adenopathy.           Objective   Physical Exam   Constitutional: She is oriented to person, place, and time. She appears well-developed and well-nourished.   HENT:   Head: Normocephalic and atraumatic.   Right Ear: Hearing, tympanic membrane, external ear and ear canal normal.   Left Ear: Hearing, tympanic membrane, external ear and ear canal normal.   Ears:    Nose: Septal deviation present. No mucosal edema, rhinorrhea or nasal deformity. No epistaxis. Right sinus exhibits no maxillary sinus tenderness and no frontal sinus tenderness. Left sinus exhibits no maxillary sinus tenderness and no frontal sinus tenderness.       Mouth/Throat: Uvula is midline, oropharynx is clear and moist and mucous membranes are normal. No trismus in the jaw. Normal dentition. No oropharyngeal exudate or posterior oropharyngeal edema. Tonsils are 0 on the right. Tonsils are 0 on the left. No tonsillar exudate.   Eyes: Conjunctivae are normal.   Neck: Normal range of  motion. Neck supple. No JVD present. No tracheal deviation present. No thyromegaly present.   Cardiovascular: Normal rate.    Pulmonary/Chest: Effort normal.   Musculoskeletal: Normal range of motion.   Lymphadenopathy:        Head (right side): No submental, no submandibular, no tonsillar, no preauricular, no posterior auricular and no occipital adenopathy present.        Head (left side): No submental, no submandibular, no tonsillar, no preauricular, no posterior auricular and no occipital adenopathy present.     She has no cervical adenopathy.        Right cervical: No superficial cervical, no deep cervical and no posterior cervical adenopathy present.       Left cervical: No superficial cervical, no deep cervical and no posterior cervical adenopathy present.   Neurological: She is alert and oriented to person, place, and time. No cranial nerve deficit.   Skin: Skin is warm.   Psychiatric: She has a normal mood and affect. Her speech is normal and behavior is normal. Thought content normal.   Nursing note and vitals reviewed.    No obvious tremors    AudioGram was reviewed showing actually slight improvement compared to last year with still high-frequency hearing loss and normal tympanograms actual tracings were reviewed with the patient        Assessment/Plan   Kalli was seen today for follow-up.    Diagnoses and all orders for this visit:    Sensorineural hearing loss (SNHL) of both ears    Tinnitus of both ears    We discussed importance of noise avoidance   Plan hearing test in 2 years now if she's having problems of her tinnitus or hearing loss worsen she is a let us know can see her sooner  she is to call for questions or problems in interim place her hearing stable

## 2018-11-01 NOTE — PROGRESS NOTES
STANDARD AUDIOMETRIC EVALUATION      Name:  Kalli Lowe  :  1950  Age:  68 y.o.  Date of Evaluation:  2018      HISTORY    Reason for visit:  Kalli Lowe is seen today for a hearing evaluation at the request of Dr. Mesfin Kline.  Patient reports no new problems or concerns.      EVALUATION    See Audiogram    RESULTS        Otoscopy and Tympanometry 226 Hz :  Right Ear:  Otoscopy:  Clear ear canal          Tympanometry:  Middle ear function within normal limits    Left Ear:   Otoscopy:  Clear ear canal        Tympanometry:  Middle ear function within normal limits    Test technique:  Standard Audiometry     Pure Tone Audiometry:   Patient responded to pure tones at 25-60 dB for 250-8000 Hz in right ear, and at 25-70 dB for 250-8000 Hz in left ear.       Speech Audiometry:        Right Ear:  Speech Reception Threshold (SRT) was obtained at 25 dBHL                 Speech Discrimination scores were 100% in quiet when words were presented at 65 dBHL       Left Ear:  Speech Reception Threshold (SRT) was obtained at 25 dBHL                 Speech Discrimination scores were 100% in quiet when words were presented at 65 dBHL    Reliability:   good    IMPRESSIONS:  1.  Tympanometry results are consistent with Middle ear function within normal limits in both ears.  2.  Pure tone results are consistent with within normal limits to moderately-severe sloping sensorineural hearing loss for both ears.     RECOMMENDATIONS:  Patient is seeing the Ear Nose and Throat physician immediately following this examination.  It was a pleasure seeing Kalli Lowe in Audiology today.  We would be happy to do further testing or discuss these test as necessary.      This document has been electronically signed by PAT Gaxiola on 2018 10:52 AM          PAT Gaxiola  Licensed Audiologist

## 2018-11-05 DIAGNOSIS — Z12.31 ENCOUNTER FOR SCREENING MAMMOGRAM FOR MALIGNANT NEOPLASM OF BREAST: Primary | ICD-10-CM

## 2018-11-16 ENCOUNTER — ANESTHESIA (OUTPATIENT)
Dept: GASTROENTEROLOGY | Facility: HOSPITAL | Age: 68
End: 2018-11-16

## 2018-11-16 ENCOUNTER — HOSPITAL ENCOUNTER (OUTPATIENT)
Facility: HOSPITAL | Age: 68
Setting detail: HOSPITAL OUTPATIENT SURGERY
Discharge: HOME | End: 2018-11-16
Attending: SURGERY | Admitting: SURGERY

## 2018-11-16 ENCOUNTER — ANESTHESIA EVENT (OUTPATIENT)
Dept: GASTROENTEROLOGY | Facility: HOSPITAL | Age: 68
End: 2018-11-16

## 2018-11-16 VITALS
HEIGHT: 63 IN | TEMPERATURE: 96.9 F | DIASTOLIC BLOOD PRESSURE: 54 MMHG | HEART RATE: 59 BPM | SYSTOLIC BLOOD PRESSURE: 106 MMHG | WEIGHT: 115 LBS | BODY MASS INDEX: 20.38 KG/M2 | RESPIRATION RATE: 20 BRPM | OXYGEN SATURATION: 98 %

## 2018-11-16 DIAGNOSIS — Z12.11 SCREEN FOR COLON CANCER: ICD-10-CM

## 2018-11-16 PROCEDURE — G0121 COLON CA SCRN NOT HI RSK IND: HCPCS | Performed by: SURGERY

## 2018-11-16 PROCEDURE — 25010000002 MIDAZOLAM PER 1 MG: Performed by: NURSE ANESTHETIST, CERTIFIED REGISTERED

## 2018-11-16 PROCEDURE — 25010000002 FENTANYL CITRATE (PF) 100 MCG/2ML SOLUTION: Performed by: NURSE ANESTHETIST, CERTIFIED REGISTERED

## 2018-11-16 PROCEDURE — 25010000002 PROPOFOL 10 MG/ML EMULSION: Performed by: NURSE ANESTHETIST, CERTIFIED REGISTERED

## 2018-11-16 RX ORDER — FENTANYL CITRATE 50 UG/ML
INJECTION, SOLUTION INTRAMUSCULAR; INTRAVENOUS AS NEEDED
Status: DISCONTINUED | OUTPATIENT
Start: 2018-11-16 | End: 2018-11-16 | Stop reason: SURG

## 2018-11-16 RX ORDER — PROPOFOL 10 MG/ML
VIAL (ML) INTRAVENOUS AS NEEDED
Status: DISCONTINUED | OUTPATIENT
Start: 2018-11-16 | End: 2018-11-16 | Stop reason: SURG

## 2018-11-16 RX ORDER — DEXTROSE AND SODIUM CHLORIDE 5; .45 G/100ML; G/100ML
100 INJECTION, SOLUTION INTRAVENOUS CONTINUOUS
Status: DISCONTINUED | OUTPATIENT
Start: 2018-11-16 | End: 2018-11-16 | Stop reason: HOSPADM

## 2018-11-16 RX ORDER — MIDAZOLAM HYDROCHLORIDE 1 MG/ML
INJECTION INTRAMUSCULAR; INTRAVENOUS AS NEEDED
Status: DISCONTINUED | OUTPATIENT
Start: 2018-11-16 | End: 2018-11-16 | Stop reason: SURG

## 2018-11-16 RX ADMIN — DEXTROSE AND SODIUM CHLORIDE 100 ML/HR: 5; 450 INJECTION, SOLUTION INTRAVENOUS at 08:17

## 2018-11-16 RX ADMIN — PROPOFOL 30 MG: 10 INJECTION, EMULSION INTRAVENOUS at 08:55

## 2018-11-16 RX ADMIN — MIDAZOLAM HYDROCHLORIDE 1 MG: 2 INJECTION, SOLUTION INTRAMUSCULAR; INTRAVENOUS at 08:49

## 2018-11-16 RX ADMIN — PROPOFOL 30 MG: 10 INJECTION, EMULSION INTRAVENOUS at 08:51

## 2018-11-16 RX ADMIN — FENTANYL CITRATE 25 MCG: 50 INJECTION, SOLUTION INTRAMUSCULAR; INTRAVENOUS at 08:49

## 2018-11-16 RX ADMIN — PROPOFOL 30 MG: 10 INJECTION, EMULSION INTRAVENOUS at 09:00

## 2018-11-16 RX ADMIN — PROPOFOL 30 MG: 10 INJECTION, EMULSION INTRAVENOUS at 09:05

## 2018-11-16 RX ADMIN — PROPOFOL 30 MG: 10 INJECTION, EMULSION INTRAVENOUS at 08:52

## 2018-11-16 RX ADMIN — PROPOFOL 30 MG: 10 INJECTION, EMULSION INTRAVENOUS at 09:10

## 2018-11-16 NOTE — ANESTHESIA PREPROCEDURE EVALUATION
Anesthesia Evaluation     NPO Solid Status: > 8 hours  NPO Liquid Status: > 2 hours           Airway   Mallampati: II  TM distance: <3 FB  Possible difficult intubation  Dental - normal exam     Pulmonary     breath sounds clear to auscultation  Cardiovascular   Exercise tolerance: good (4-7 METS)    Rhythm: regular  Rate: normal        Neuro/Psych  (+) dizziness/light headedness,     GI/Hepatic/Renal/Endo    (+)  GERD,      Musculoskeletal     Abdominal    Substance History      OB/GYN          Other   (+) arthritis                   Anesthesia Plan    ASA 2     MAC     Anesthetic plan, all risks, benefits, and alternatives have been provided, discussed and informed consent has been obtained with: patient.    Plan discussed with CRNA.

## 2018-11-16 NOTE — ANESTHESIA POSTPROCEDURE EVALUATION
Patient: Kalli Lowe    Procedure Summary     Date:  11/16/18 Room / Location:  [facility] ENDOSCOPY 2 / [facility] ENDOSCOPY    Anesthesia Start:  0849 Anesthesia Stop:  0921    Procedure:  COLONOSCOPY (N/A ) Diagnosis:       Screen for colon cancer      (Screen for colon cancer [Z12.11])    Surgeon:  Claudio Martell MD Provider:  Anni Bain CRNA    Anesthesia Type:  MAC ASA Status:  2          Anesthesia Type: MAC  Last vitals  BP   141/74 (11/16/18 0803)   Temp   97.9 °F (36.6 °C) (11/16/18 0803)   Pulse   89 (11/16/18 0803)   Resp   18 (11/16/18 0803)     SpO2   100 % (11/16/18 0803)     Post Anesthesia Care and Evaluation    Patient location during evaluation: bedside  Patient participation: complete - patient participated  Level of consciousness: awake  Pain score: 0  Pain management: adequate  Airway patency: patent  Anesthetic complications: No anesthetic complications  PONV Status: none  Cardiovascular status: acceptable and hemodynamically stable  Respiratory status: acceptable and spontaneous ventilation  Hydration status: acceptable

## 2018-11-16 NOTE — H&P
No chief complaint on file.      Kalli Lowe is a 68 y.o. female referred today for evaluation for colonoscopy.  She notes no change in bowel habits, no blood in the stool.   Mother had colon cancer in her 80's    Prior Colonoscopy:yes  Prior Polyps:no  Family History of Colon Cancer:yes  On anticoagulation:no    Past Surgical History:   Procedure Laterality Date   • BREAST BIOPSY  09/08/2014    Ultrasound guided left breast mammotome biopsy with 8 gauge needle and also left a clip.   • COLONOSCOPY  11/12/2013    Hemorrhoids found.   • HIP SURGERY Right 01/24/2017    Total hip-lateral approach   • INJECTION OF MEDICATION  08/31/2015    PNEUMOC VAC/ADMIN/RCVD 4040F (1)      • JOINT REPLACEMENT Right     HIP   • PAP SMEAR  08/31/2015    WNL, CARD SENT, DR. CARLSON'S OFFICE     Past Medical History:   Diagnosis Date   • Benign paroxysmal positional vertigo    • Breast lump     left breast benign on u/s guided mammotone bx      • Contact dermatitis     olecranon area from chair material?     • Cough    • Encounter for long-term (current) use of medications    • GERD (gastroesophageal reflux disease)    • H/O screening mammography    • Hip pain    • History of Holter monitoring 11/19/2013    Overall rhythm sinus rhythm with average heart rate of 73 bpm, min 49 , max 111. Frequent isolated with frequent bigeminal cycles, total of 491 bigeminal cycles. No couplets of runs of ventricular tachycardia. Rare isolated PACs, total of 25.   • History of urinary disorder    • Large ovary    • Myalgia    • Osteoarthritis    • Osteopenia    • Otalgia of left ear     resolved spontaneously      • Palpitations    • Parkinsonism (CMS/HCC)    • Postmenopausal state    • Screening for malignant neoplasm of breast      left breast benign mammotone bx      • Screening for osteoporosis    • Vertigo     resolved     Social History     Socioeconomic History   • Marital status:      Spouse name: Not on file   • Number of  children: Not on file   • Years of education: Not on file   • Highest education level: Not on file   Social Needs   • Financial resource strain: Not on file   • Food insecurity - worry: Not on file   • Food insecurity - inability: Not on file   • Transportation needs - medical: Not on file   • Transportation needs - non-medical: Not on file   Occupational History   • Not on file   Tobacco Use   • Smoking status: Never Smoker   • Smokeless tobacco: Never Used   Substance and Sexual Activity   • Alcohol use: No   • Drug use: No   • Sexual activity: Defer   Other Topics Concern   • Not on file   Social History Narrative   • Not on file     Allergies   Allergen Reactions   • Contrast Dye Nausea And Vomiting       Home Medications:  Prior to Admission medications    Medication Sig Start Date End Date Taking? Authorizing Provider   doxycycline (VIBRAMYCIN) 100 MG capsule Take 1 capsule by mouth Daily. 7/11/18  Yes Provider, MD Aurelio   Denosumab (PROLIA SC) Inject  under the skin into the appropriate area as directed Every 6 (Six) Months.    Provider, MD Aurelio       Review of Systems   Constitutional: Negative.    HENT: Negative for hearing loss, nosebleeds and trouble swallowing.    Respiratory: Negative for apnea, chest tightness and shortness of breath.    Cardiovascular: Negative for chest pain and palpitations.   Gastrointestinal: Negative for abdominal distention, abdominal pain, blood in stool, constipation, diarrhea, nausea and vomiting.   Genitourinary: Negative for difficulty urinating, dysuria, frequency and urgency.   Musculoskeletal: Negative for back pain, joint swelling and neck pain.   Skin: Negative for rash.   Neurological: Negative for dizziness, seizures, weakness, light-headedness, numbness and headaches.   Hematological: Negative for adenopathy.   Psychiatric/Behavioral: Negative for agitation. The patient is not nervous/anxious.        Vitals:    11/16/18 0803   BP: 141/74   Pulse: 89    Resp: 18   Temp: 97.9 °F (36.6 °C)   SpO2: 100%       Physical Exam   Constitutional: She is oriented to person, place, and time. She appears well-developed and well-nourished. No distress.   HENT:   Head: Normocephalic and atraumatic.   Nose: Nose normal.   Eyes: Conjunctivae are normal.   Neck: Normal range of motion. No tracheal deviation present. No thyromegaly present.   Cardiovascular: Normal rate, regular rhythm and normal heart sounds.   No murmur heard.  Pulmonary/Chest: Effort normal and breath sounds normal. No respiratory distress. She has no wheezes. She has no rales. She exhibits no tenderness.   Abdominal: Soft. She exhibits no distension. There is no tenderness. There is no rebound and no guarding. No hernia.   Musculoskeletal: She exhibits no tenderness or deformity.   Neurological: She is alert and oriented to person, place, and time.   Skin: Skin is warm and dry. No rash noted.   Psychiatric: She has a normal mood and affect. Her behavior is normal. Judgment and thought content normal.   Vitals reviewed.      Assessment     In need of screening colonoscopy.    Plan     Risks, benefits, rationale and prep for colonoscopy have been discussed with the patient.  The patient indicates understanding of these issues and agrees with the plan.

## 2019-01-07 ENCOUNTER — APPOINTMENT (OUTPATIENT)
Dept: ONCOLOGY | Facility: HOSPITAL | Age: 69
End: 2019-01-07

## 2019-01-07 ENCOUNTER — INFUSION (OUTPATIENT)
Dept: ONCOLOGY | Facility: HOSPITAL | Age: 69
End: 2019-01-07

## 2019-01-07 DIAGNOSIS — M19.90 OSTEOARTHRITIS, UNSPECIFIED OSTEOARTHRITIS TYPE, UNSPECIFIED SITE: Primary | ICD-10-CM

## 2019-01-07 DIAGNOSIS — M85.80 OSTEOPENIA, UNSPECIFIED LOCATION: ICD-10-CM

## 2019-01-07 LAB
CALCIUM SPEC-SCNC: 8.8 MG/DL (ref 8.4–10.2)
MAGNESIUM SERPL-MCNC: 2.2 MG/DL (ref 1.6–2.3)
PHOSPHATE SERPL-MCNC: 3.4 MG/DL (ref 2.4–4.4)

## 2019-01-07 PROCEDURE — 96372 THER/PROPH/DIAG INJ SC/IM: CPT | Performed by: NURSE PRACTITIONER

## 2019-01-07 PROCEDURE — 25010000002 DENOSUMAB 60 MG/ML SOLUTION: Performed by: GENERAL PRACTICE

## 2019-01-07 PROCEDURE — 36415 COLL VENOUS BLD VENIPUNCTURE: CPT | Performed by: NURSE PRACTITIONER

## 2019-01-07 PROCEDURE — 84100 ASSAY OF PHOSPHORUS: CPT | Performed by: NURSE PRACTITIONER

## 2019-01-07 PROCEDURE — 83735 ASSAY OF MAGNESIUM: CPT | Performed by: NURSE PRACTITIONER

## 2019-01-07 PROCEDURE — 82310 ASSAY OF CALCIUM: CPT | Performed by: NURSE PRACTITIONER

## 2019-01-07 RX ADMIN — DENOSUMAB 60 MG: 60 INJECTION SUBCUTANEOUS at 15:06

## 2019-01-07 NOTE — PATIENT INSTRUCTIONS
Denosumab injection  What is this medicine?  DENOSUMAB (den oh jakob mab) slows bone breakdown. Prolia is used to treat osteoporosis in women after menopause and in men. Xgeva is used to treat a high calcium level due to cancer and to prevent bone fractures and other bone problems caused by multiple myeloma or cancer bone metastases. Xgeva is also used to treat giant cell tumor of the bone.  This medicine may be used for other purposes; ask your health care provider or pharmacist if you have questions.  COMMON BRAND NAME(S): Prolia, XGEVA  What should I tell my health care provider before I take this medicine?  They need to know if you have any of these conditions:  -dental disease  -having surgery or tooth extraction  -infection  -kidney disease  -low levels of calcium or Vitamin D in the blood  -malnutrition  -on hemodialysis  -skin conditions or sensitivity  -thyroid or parathyroid disease  -an unusual reaction to denosumab, other medicines, foods, dyes, or preservatives  -pregnant or trying to get pregnant  -breast-feeding  How should I use this medicine?  This medicine is for injection under the skin. It is given by a health care professional in a hospital or clinic setting.  If you are getting Prolia, a special MedGuide will be given to you by the pharmacist with each prescription and refill. Be sure to read this information carefully each time.  For Prolia, talk to your pediatrician regarding the use of this medicine in children. Special care may be needed. For Xgeva, talk to your pediatrician regarding the use of this medicine in children. While this drug may be prescribed for children as young as 13 years for selected conditions, precautions do apply.  Overdosage: If you think you have taken too much of this medicine contact a poison control center or emergency room at once.  NOTE: This medicine is only for you. Do not share this medicine with others.  What if I miss a dose?  It is important not to miss your  dose. Call your doctor or health care professional if you are unable to keep an appointment.  What may interact with this medicine?  Do not take this medicine with any of the following medications:  -other medicines containing denosumab  This medicine may also interact with the following medications:  -medicines that lower your chance of fighting infection  -steroid medicines like prednisone or cortisone  This list may not describe all possible interactions. Give your health care provider a list of all the medicines, herbs, non-prescription drugs, or dietary supplements you use. Also tell them if you smoke, drink alcohol, or use illegal drugs. Some items may interact with your medicine.  What should I watch for while using this medicine?  Visit your doctor or health care professional for regular checks on your progress. Your doctor or health care professional may order blood tests and other tests to see how you are doing.  Call your doctor or health care professional for advice if you get a fever, chills or sore throat, or other symptoms of a cold or flu. Do not treat yourself. This drug may decrease your body's ability to fight infection. Try to avoid being around people who are sick.  You should make sure you get enough calcium and vitamin D while you are taking this medicine, unless your doctor tells you not to. Discuss the foods you eat and the vitamins you take with your health care professional.  See your dentist regularly. Brush and floss your teeth as directed. Before you have any dental work done, tell your dentist you are receiving this medicine.  Do not become pregnant while taking this medicine or for 5 months after stopping it. Talk with your doctor or health care professional about your birth control options while taking this medicine. Women should inform their doctor if they wish to become pregnant or think they might be pregnant. There is a potential for serious side effects to an unborn child. Talk  to your health care professional or pharmacist for more information.  What side effects may I notice from receiving this medicine?  Side effects that you should report to your doctor or health care professional as soon as possible:  -allergic reactions like skin rash, itching or hives, swelling of the face, lips, or tongue  -bone pain  -breathing problems  -dizziness  -jaw pain, especially after dental work  -redness, blistering, peeling of the skin  -signs and symptoms of infection like fever or chills; cough; sore throat; pain or trouble passing urine  -signs of low calcium like fast heartbeat, muscle cramps or muscle pain; pain, tingling, numbness in the hands or feet; seizures  -unusual bleeding or bruising  -unusually weak or tired  Side effects that usually do not require medical attention (report to your doctor or health care professional if they continue or are bothersome):  -constipation  -diarrhea  -headache  -joint pain  -loss of appetite  -muscle pain  -runny nose  -tiredness  -upset stomach  This list may not describe all possible side effects. Call your doctor for medical advice about side effects. You may report side effects to FDA at 9-092-FDA-0826.  Where should I keep my medicine?  This medicine is only given in a clinic, doctor's office, or other health care setting and will not be stored at home.  NOTE: This sheet is a summary. It may not cover all possible information. If you have questions about this medicine, talk to your doctor, pharmacist, or health care provider.  © 2018 [employer]vier/Gold Standard (2018-01-09 19:17:21)

## 2019-01-21 ENCOUNTER — TELEPHONE (OUTPATIENT)
Dept: FAMILY MEDICINE CLINIC | Facility: CLINIC | Age: 69
End: 2019-01-21

## 2019-01-21 NOTE — TELEPHONE ENCOUNTER
Kim called and said she wants to be referred to [facility] for Parkinsons.  She left the phone number to call them regarding info they need.    [phone]

## 2019-03-05 ENCOUNTER — TELEPHONE (OUTPATIENT)
Dept: FAMILY MEDICINE CLINIC | Facility: CLINIC | Age: 69
End: 2019-03-05

## 2019-11-07 ENCOUNTER — CLINICAL SUPPORT (OUTPATIENT)
Dept: AUDIOLOGY | Facility: CLINIC | Age: 69
End: 2019-11-07

## 2019-11-07 ENCOUNTER — OFFICE VISIT (OUTPATIENT)
Dept: OTOLARYNGOLOGY | Facility: CLINIC | Age: 69
End: 2019-11-07

## 2019-11-07 VITALS — HEIGHT: 63 IN | TEMPERATURE: 97.9 F | BODY MASS INDEX: 21.02 KG/M2 | WEIGHT: 118.6 LBS

## 2019-11-07 DIAGNOSIS — H90.3 SENSORINEURAL HEARING LOSS (SNHL) OF BOTH EARS: ICD-10-CM

## 2019-11-07 DIAGNOSIS — H90.3 SENSORINEURAL HEARING LOSS, BILATERAL: Primary | ICD-10-CM

## 2019-11-07 DIAGNOSIS — H93.13 TINNITUS OF BOTH EARS: Primary | ICD-10-CM

## 2019-11-07 PROCEDURE — 99213 OFFICE O/P EST LOW 20 MIN: CPT | Performed by: OTOLARYNGOLOGY

## 2019-11-07 RX ORDER — CITALOPRAM 10 MG/1
10 TABLET ORAL DAILY
COMMUNITY
Start: 2019-08-15 | End: 2019-12-13 | Stop reason: SDUPTHER

## 2019-11-07 NOTE — PROGRESS NOTES
Subjective   Kalli Tricia Lowe is a 69 y.o. female.   Follow-up ears    History of Present Illness   Patient does not have any pain drainage or major change in hearing she hears cricket sounds all the time it does not bother her she is recently diagnosed with Parkinson's not have any major nose or throat problems a minimal effect on her voice      The following portions of the patient's history were reviewed and updated as appropriate: allergies, current medications, past family history, past medical history, past social history, past surgical history and problem list.      Current Outpatient Medications:   •  citalopram (CeleXA) 10 MG tablet, Take 10 mg by mouth Daily., Disp: , Rfl:   •  Denosumab (PROLIA SC), Inject  under the skin into the appropriate area as directed Every 6 (Six) Months., Disp: , Rfl:   •  doxycycline (VIBRAMYCIN) 100 MG capsule, Take 1 capsule by mouth Daily., Disp: , Rfl:     Allergies   Allergen Reactions   • Contrast Dye Nausea And Vomiting             Review of Systems   Constitutional: Negative for fever.   HENT: Positive for hearing loss and tinnitus. Negative for congestion, ear discharge and ear pain.    Neurological: Positive for tremors.   Hematological: Negative for adenopathy.           Objective   Physical Exam   Constitutional: She appears well-developed and well-nourished.   HENT:   Head: Normocephalic.   Right Ear: External ear normal.   Left Ear: External ear normal.   Nose: Nose normal.   Mouth/Throat: Oropharynx is clear and moist.   Pulmonary/Chest: Effort normal.   Musculoskeletal:   No observed tremors   voice is good   Neurological: She is alert.   Nursing note and vitals reviewed.    Audiogram was reviewed with patient similar last year he has bilateral sensorineural hearing loss actual tracings were shown no evidence of middle ear process with mild decrease on the left but not severe      Assessment/Plan   Kalli was seen today for follow-up.    Diagnoses and  all orders for this visit:    Tinnitus of both ears    Sensorineural hearing loss (SNHL) of both ears      Discussed options for follow-up call if worsening  Symptoms will consider repeating hearing test and or MRI she does not want to pursue that at this point  She is tolerating tinnitus well so no need for medication    Call if problems with voice with her Parkinson's    Recheck 1 year unless having difficulty

## 2019-11-07 NOTE — PROGRESS NOTES
STANDARD AUDIOMETRIC EVALUATION      Name:  Kalli Lowe  :  1950  Age:  69 y.o.  Date of Evaluation:  2019      HISTORY    Reason for visit:  Kalli Lowe is seen today for a hearing test at the request of Dr. Mesfin Kline.  Patient reports she thinks she is hearing less in both ears.      EVALUATION    See Audiogram    RESULTS        Otoscopy and Tympanometry 226 Hz :  Right Ear:  Otoscopy:  Testing completed after ears were examined by the ENT physician          Tympanometry:  Middle ear function within normal limits    Left Ear:   Otoscopy:  Testing completed after ears were examined by the ENT physician        Tympanometry:  Middle ear function within normal limits    Test technique:  Standard Audiometry     Pure Tone Audiometry:   Patient responded to pure tones at 25-60 dB for 250-8000 Hz in right ear, and at 25-75 dB for 250-8000 Hz in left ear.       Speech Audiometry:        Right Ear:  Speech Reception Threshold (SRT) was obtained at 25 dBHL                 Speech Discrimination scores were 100% in quiet when words were presented at 65 dBHL       Left Ear:  Speech Reception Threshold (SRT) was obtained at 30 dBHL                 Speech Discrimination scores were 100% in quiet when words were presented at 70 dBHL    Reliability:   good    IMPRESSIONS:  1.  Tympanometry results are consistent with Middle ear function within normal limits in both ears.  2.  Pure tone results are consistent with mild to moderately severe sloping sensorineural hearing loss  for both ears.       RECOMMENDATIONS:  Patient is seeing the Ear Nose and Throat physician immediately following this examination.  It was a pleasure seeing Kalli Lowe in Audiology today.  We would be happy to do further testing or discuss these test as necessary.          This document has been electronically signed by July Madrid MS CCC-ZAYRA on 2019 11:23 AM        July Madrid MS [facility]  Licensed Audiologist

## 2019-11-07 NOTE — PATIENT INSTRUCTIONS

## 2019-12-13 ENCOUNTER — OFFICE VISIT (OUTPATIENT)
Dept: FAMILY MEDICINE CLINIC | Facility: CLINIC | Age: 69
End: 2019-12-13

## 2019-12-13 ENCOUNTER — LAB (OUTPATIENT)
Dept: LAB | Facility: HOSPITAL | Age: 69
End: 2019-12-13

## 2019-12-13 VITALS
OXYGEN SATURATION: 99 % | HEIGHT: 63 IN | BODY MASS INDEX: 21.09 KG/M2 | DIASTOLIC BLOOD PRESSURE: 60 MMHG | WEIGHT: 119 LBS | HEART RATE: 69 BPM | SYSTOLIC BLOOD PRESSURE: 90 MMHG

## 2019-12-13 DIAGNOSIS — Z00.00 MEDICARE ANNUAL WELLNESS VISIT, SUBSEQUENT: Primary | ICD-10-CM

## 2019-12-13 DIAGNOSIS — M81.0 AGE-RELATED OSTEOPOROSIS WITHOUT CURRENT PATHOLOGICAL FRACTURE: Chronic | ICD-10-CM

## 2019-12-13 DIAGNOSIS — G20 PARKINSON DISEASE (HCC): Chronic | ICD-10-CM

## 2019-12-13 DIAGNOSIS — M81.0 AGE-RELATED OSTEOPOROSIS WITHOUT CURRENT PATHOLOGICAL FRACTURE: ICD-10-CM

## 2019-12-13 DIAGNOSIS — Z13.220 SCREENING FOR HYPERLIPIDEMIA: ICD-10-CM

## 2019-12-13 DIAGNOSIS — Z12.31 ENCOUNTER FOR SCREENING MAMMOGRAM FOR BREAST CANCER: ICD-10-CM

## 2019-12-13 DIAGNOSIS — Z78.0 POSTMENOPAUSAL: ICD-10-CM

## 2019-12-13 DIAGNOSIS — F32.A DEPRESSIVE DISORDER: Chronic | ICD-10-CM

## 2019-12-13 LAB
ALBUMIN SERPL-MCNC: 4.4 G/DL (ref 3.5–5.2)
ALBUMIN/GLOB SERPL: 1.6 G/DL
ALP SERPL-CCNC: 80 U/L (ref 39–117)
ALT SERPL W P-5'-P-CCNC: 17 U/L (ref 1–33)
ANION GAP SERPL CALCULATED.3IONS-SCNC: 13.1 MMOL/L (ref 5–15)
AST SERPL-CCNC: 27 U/L (ref 1–32)
BASOPHILS # BLD AUTO: 0.04 10*3/MM3 (ref 0–0.2)
BASOPHILS NFR BLD AUTO: 0.8 % (ref 0–1.5)
BILIRUB SERPL-MCNC: 0.4 MG/DL (ref 0.2–1.2)
BUN BLD-MCNC: 14 MG/DL (ref 8–23)
BUN/CREAT SERPL: 14.7 (ref 7–25)
CALCIUM SPEC-SCNC: 9.4 MG/DL (ref 8.6–10.5)
CHLORIDE SERPL-SCNC: 100 MMOL/L (ref 98–107)
CHOLEST SERPL-MCNC: 182 MG/DL (ref 0–200)
CO2 SERPL-SCNC: 27.9 MMOL/L (ref 22–29)
CREAT BLD-MCNC: 0.95 MG/DL (ref 0.57–1)
DEPRECATED RDW RBC AUTO: 42.5 FL (ref 37–54)
EOSINOPHIL # BLD AUTO: 0.12 10*3/MM3 (ref 0–0.4)
EOSINOPHIL NFR BLD AUTO: 2.3 % (ref 0.3–6.2)
ERYTHROCYTE [DISTWIDTH] IN BLOOD BY AUTOMATED COUNT: 13 % (ref 12.3–15.4)
GFR SERPL CREATININE-BSD FRML MDRD: 58 ML/MIN/1.73
GLOBULIN UR ELPH-MCNC: 2.8 GM/DL
GLUCOSE BLD-MCNC: 79 MG/DL (ref 65–99)
HCT VFR BLD AUTO: 36.7 % (ref 34–46.6)
HDLC SERPL-MCNC: 81 MG/DL (ref 40–60)
HGB BLD-MCNC: 12.7 G/DL (ref 12–15.9)
IMM GRANULOCYTES # BLD AUTO: 0.01 10*3/MM3 (ref 0–0.05)
IMM GRANULOCYTES NFR BLD AUTO: 0.2 % (ref 0–0.5)
LDLC SERPL CALC-MCNC: 71 MG/DL (ref 0–100)
LDLC/HDLC SERPL: 0.88 {RATIO}
LYMPHOCYTES # BLD AUTO: 2.05 10*3/MM3 (ref 0.7–3.1)
LYMPHOCYTES NFR BLD AUTO: 39.6 % (ref 19.6–45.3)
MAGNESIUM SERPL-MCNC: 2.1 MG/DL (ref 1.6–2.4)
MCH RBC QN AUTO: 31.4 PG (ref 26.6–33)
MCHC RBC AUTO-ENTMCNC: 34.6 G/DL (ref 31.5–35.7)
MCV RBC AUTO: 90.6 FL (ref 79–97)
MONOCYTES # BLD AUTO: 0.4 10*3/MM3 (ref 0.1–0.9)
MONOCYTES NFR BLD AUTO: 7.7 % (ref 5–12)
NEUTROPHILS # BLD AUTO: 2.56 10*3/MM3 (ref 1.7–7)
NEUTROPHILS NFR BLD AUTO: 49.4 % (ref 42.7–76)
NRBC BLD AUTO-RTO: 0 /100 WBC (ref 0–0.2)
PHOSPHATE SERPL-MCNC: 4.1 MG/DL (ref 2.5–4.5)
PLATELET # BLD AUTO: 201 10*3/MM3 (ref 140–450)
PMV BLD AUTO: 10.7 FL (ref 6–12)
POTASSIUM BLD-SCNC: 4 MMOL/L (ref 3.5–5.2)
PROT SERPL-MCNC: 7.2 G/DL (ref 6–8.5)
RBC # BLD AUTO: 4.05 10*6/MM3 (ref 3.77–5.28)
SODIUM BLD-SCNC: 141 MMOL/L (ref 136–145)
TRIGL SERPL-MCNC: 148 MG/DL (ref 0–150)
VLDLC SERPL-MCNC: 29.6 MG/DL (ref 5–40)
WBC NRBC COR # BLD: 5.18 10*3/MM3 (ref 3.4–10.8)

## 2019-12-13 PROCEDURE — 80053 COMPREHEN METABOLIC PANEL: CPT | Performed by: GENERAL PRACTICE

## 2019-12-13 PROCEDURE — 80061 LIPID PANEL: CPT | Performed by: GENERAL PRACTICE

## 2019-12-13 PROCEDURE — 99214 OFFICE O/P EST MOD 30 MIN: CPT | Performed by: GENERAL PRACTICE

## 2019-12-13 PROCEDURE — G0439 PPPS, SUBSEQ VISIT: HCPCS | Performed by: GENERAL PRACTICE

## 2019-12-13 PROCEDURE — 83735 ASSAY OF MAGNESIUM: CPT

## 2019-12-13 PROCEDURE — 36415 COLL VENOUS BLD VENIPUNCTURE: CPT | Performed by: GENERAL PRACTICE

## 2019-12-13 PROCEDURE — 85025 COMPLETE CBC W/AUTO DIFF WBC: CPT | Performed by: GENERAL PRACTICE

## 2019-12-13 PROCEDURE — 84100 ASSAY OF PHOSPHORUS: CPT

## 2019-12-13 RX ORDER — CITALOPRAM 10 MG/1
10 TABLET ORAL DAILY
Qty: 90 TABLET | Refills: 3 | Status: SHIPPED | OUTPATIENT
Start: 2019-12-13 | End: 2021-02-08

## 2019-12-16 ENCOUNTER — TELEPHONE (OUTPATIENT)
Dept: FAMILY MEDICINE CLINIC | Facility: CLINIC | Age: 69
End: 2019-12-16

## 2019-12-16 NOTE — TELEPHONE ENCOUNTER
Per Dr. Hidalgo, Ms. Lowe has been called with recent DEXA Bone Density Study results & recommendations.  Continue current medications and follow-up as planned or sooner if any problems.    Soledad or Krysten  She is asking if we set the appt up of the Prolia injection or does she call.   Is said her last one was in May of 2019 she is behind.   I assume the first step is to send a refill to her Pharmacy?    Please Advise  KAREN Juarez      ----- Message from Tiffanie Hidalgo MD sent at 12/16/2019 12:28 PM CST -----  Call and tell bone density is improving, plan to continue prolia

## 2019-12-30 DIAGNOSIS — Z12.31 ENCOUNTER FOR SCREENING MAMMOGRAM FOR MALIGNANT NEOPLASM OF BREAST: Primary | ICD-10-CM

## 2020-01-01 PROBLEM — F32.A DEPRESSIVE DISORDER: Chronic | Status: ACTIVE | Noted: 2020-01-01

## 2020-01-01 PROBLEM — G20.A1 PARKINSON DISEASE: Chronic | Status: ACTIVE | Noted: 2020-01-01

## 2020-01-01 PROBLEM — G20 PARKINSON DISEASE (HCC): Chronic | Status: ACTIVE | Noted: 2020-01-01

## 2020-01-28 ENCOUNTER — APPOINTMENT (OUTPATIENT)
Dept: INFUSION THERAPY | Facility: HOSPITAL | Age: 70
End: 2020-01-28

## 2020-01-30 ENCOUNTER — OFFICE VISIT (OUTPATIENT)
Dept: FAMILY MEDICINE CLINIC | Facility: CLINIC | Age: 70
End: 2020-01-30

## 2020-01-30 VITALS
HEIGHT: 63 IN | BODY MASS INDEX: 21.4 KG/M2 | OXYGEN SATURATION: 98 % | SYSTOLIC BLOOD PRESSURE: 100 MMHG | WEIGHT: 120.8 LBS | DIASTOLIC BLOOD PRESSURE: 60 MMHG

## 2020-01-30 DIAGNOSIS — L98.9 SKIN LESION OF CHEST WALL: Primary | ICD-10-CM

## 2020-01-30 PROCEDURE — 99213 OFFICE O/P EST LOW 20 MIN: CPT | Performed by: GENERAL PRACTICE

## 2020-01-30 RX ORDER — TRIAMCINOLONE ACETONIDE 0.25 MG/G
CREAM TOPICAL 2 TIMES DAILY
Qty: 15 G | Refills: 0 | Status: SHIPPED | OUTPATIENT
Start: 2020-01-30 | End: 2021-04-02

## 2020-02-18 ENCOUNTER — HOSPITAL ENCOUNTER (OUTPATIENT)
Dept: INFUSION THERAPY | Facility: HOSPITAL | Age: 70
Discharge: HOME | End: 2020-02-18

## 2020-02-18 ENCOUNTER — HOSPITAL ENCOUNTER (OUTPATIENT)
Dept: INFUSION THERAPY | Facility: HOSPITAL | Age: 70
Discharge: HOME | End: 2020-02-18
Admitting: GENERAL PRACTICE

## 2020-02-18 VITALS
SYSTOLIC BLOOD PRESSURE: 114 MMHG | HEART RATE: 90 BPM | RESPIRATION RATE: 18 BRPM | DIASTOLIC BLOOD PRESSURE: 62 MMHG | OXYGEN SATURATION: 97 % | TEMPERATURE: 97.8 F

## 2020-02-18 DIAGNOSIS — M19.90 OSTEOARTHRITIS, UNSPECIFIED OSTEOARTHRITIS TYPE, UNSPECIFIED SITE: ICD-10-CM

## 2020-02-18 DIAGNOSIS — M19.90 OSTEOARTHRITIS, UNSPECIFIED OSTEOARTHRITIS TYPE, UNSPECIFIED SITE: Primary | ICD-10-CM

## 2020-02-18 DIAGNOSIS — M85.80 OSTEOPENIA, UNSPECIFIED LOCATION: ICD-10-CM

## 2020-02-18 LAB
CALCIUM SPEC-SCNC: 9.4 MG/DL (ref 8.6–10.5)
MAGNESIUM SERPL-MCNC: 2 MG/DL (ref 1.6–2.4)
PHOSPHATE SERPL-MCNC: 3.4 MG/DL (ref 2.5–4.5)
POTASSIUM BLD-SCNC: 4.1 MMOL/L (ref 3.5–5.2)

## 2020-02-18 PROCEDURE — 36415 COLL VENOUS BLD VENIPUNCTURE: CPT | Performed by: GENERAL PRACTICE

## 2020-02-18 PROCEDURE — 83735 ASSAY OF MAGNESIUM: CPT | Performed by: GENERAL PRACTICE

## 2020-02-18 PROCEDURE — 82310 ASSAY OF CALCIUM: CPT | Performed by: GENERAL PRACTICE

## 2020-02-18 PROCEDURE — 96372 THER/PROPH/DIAG INJ SC/IM: CPT | Performed by: GENERAL PRACTICE

## 2020-02-18 PROCEDURE — 84132 ASSAY OF SERUM POTASSIUM: CPT | Performed by: GENERAL PRACTICE

## 2020-02-18 PROCEDURE — 84100 ASSAY OF PHOSPHORUS: CPT | Performed by: GENERAL PRACTICE

## 2020-02-18 PROCEDURE — 25010000002 DENOSUMAB 60 MG/ML SOLUTION PREFILLED SYRINGE: Performed by: GENERAL PRACTICE

## 2020-02-18 RX ADMIN — DENOSUMAB 60 MG: 60 INJECTION SUBCUTANEOUS at 11:29

## 2020-08-11 ENCOUNTER — LAB (OUTPATIENT)
Dept: LAB | Facility: HOSPITAL | Age: 70
End: 2020-08-11

## 2020-08-11 DIAGNOSIS — M85.80 OSTEOPENIA, UNSPECIFIED LOCATION: ICD-10-CM

## 2020-08-11 DIAGNOSIS — M85.80 OSTEOPENIA, UNSPECIFIED LOCATION: Primary | ICD-10-CM

## 2020-08-11 DIAGNOSIS — Z13.220 SCREENING FOR HYPERLIPIDEMIA: ICD-10-CM

## 2020-08-11 PROCEDURE — 84100 ASSAY OF PHOSPHORUS: CPT

## 2020-08-11 PROCEDURE — 36415 COLL VENOUS BLD VENIPUNCTURE: CPT

## 2020-08-11 PROCEDURE — 80061 LIPID PANEL: CPT

## 2020-08-11 PROCEDURE — 80053 COMPREHEN METABOLIC PANEL: CPT

## 2020-08-11 PROCEDURE — 83735 ASSAY OF MAGNESIUM: CPT

## 2020-08-12 DIAGNOSIS — M85.80 OSTEOPENIA, UNSPECIFIED LOCATION: Primary | ICD-10-CM

## 2020-08-12 LAB
ALBUMIN SERPL-MCNC: 4.4 G/DL (ref 3.5–5.2)
ALBUMIN/GLOB SERPL: 1.7 G/DL
ALP SERPL-CCNC: 72 U/L (ref 39–117)
ALT SERPL W P-5'-P-CCNC: 11 U/L (ref 1–33)
ANION GAP SERPL CALCULATED.3IONS-SCNC: 10.8 MMOL/L (ref 5–15)
AST SERPL-CCNC: 23 U/L (ref 1–32)
BILIRUB SERPL-MCNC: 0.5 MG/DL (ref 0–1.2)
BUN SERPL-MCNC: 21 MG/DL (ref 8–23)
BUN/CREAT SERPL: 22.8 (ref 7–25)
CALCIUM SPEC-SCNC: 9.3 MG/DL (ref 8.6–10.5)
CHLORIDE SERPL-SCNC: 102 MMOL/L (ref 98–107)
CHOLEST SERPL-MCNC: 188 MG/DL (ref 0–200)
CO2 SERPL-SCNC: 28.2 MMOL/L (ref 22–29)
CREAT SERPL-MCNC: 0.92 MG/DL (ref 0.57–1)
GFR SERPL CREATININE-BSD FRML MDRD: 60 ML/MIN/1.73
GLOBULIN UR ELPH-MCNC: 2.6 GM/DL
GLUCOSE SERPL-MCNC: 138 MG/DL (ref 65–99)
HDLC SERPL-MCNC: 64 MG/DL (ref 40–60)
LDLC SERPL CALC-MCNC: 88 MG/DL (ref 0–100)
LDLC/HDLC SERPL: 1.37 {RATIO}
MAGNESIUM SERPL-MCNC: 2.1 MG/DL (ref 1.6–2.4)
PHOSPHATE SERPL-MCNC: 5.4 MG/DL (ref 2.5–4.5)
POTASSIUM SERPL-SCNC: 4.2 MMOL/L (ref 3.5–5.2)
PROT SERPL-MCNC: 7 G/DL (ref 6–8.5)
SODIUM SERPL-SCNC: 141 MMOL/L (ref 136–145)
TRIGL SERPL-MCNC: 182 MG/DL (ref 0–150)
VLDLC SERPL-MCNC: 36.4 MG/DL (ref 5–40)

## 2020-08-18 ENCOUNTER — APPOINTMENT (OUTPATIENT)
Dept: ONCOLOGY | Facility: HOSPITAL | Age: 70
End: 2020-08-18

## 2020-08-18 ENCOUNTER — APPOINTMENT (OUTPATIENT)
Dept: INFUSION THERAPY | Facility: HOSPITAL | Age: 70
End: 2020-08-18

## 2020-08-18 ENCOUNTER — INFUSION (OUTPATIENT)
Dept: ONCOLOGY | Facility: HOSPITAL | Age: 70
End: 2020-08-18

## 2020-08-18 VITALS — SYSTOLIC BLOOD PRESSURE: 130 MMHG | DIASTOLIC BLOOD PRESSURE: 80 MMHG | TEMPERATURE: 97.3 F | HEART RATE: 88 BPM

## 2020-08-18 DIAGNOSIS — M19.90 OSTEOARTHRITIS, UNSPECIFIED OSTEOARTHRITIS TYPE, UNSPECIFIED SITE: Primary | ICD-10-CM

## 2020-08-18 DIAGNOSIS — M85.80 OSTEOPENIA, UNSPECIFIED LOCATION: ICD-10-CM

## 2020-08-18 PROCEDURE — 96372 THER/PROPH/DIAG INJ SC/IM: CPT | Performed by: NURSE PRACTITIONER

## 2020-08-18 PROCEDURE — 25010000002 DENOSUMAB 60 MG/ML SOLUTION PREFILLED SYRINGE: Performed by: GENERAL PRACTICE

## 2020-08-18 RX ADMIN — DENOSUMAB 60 MG: 60 INJECTION SUBCUTANEOUS at 11:21

## 2020-09-14 ENCOUNTER — LAB (OUTPATIENT)
Dept: LAB | Facility: HOSPITAL | Age: 70
End: 2020-09-14

## 2020-09-14 ENCOUNTER — OFFICE VISIT (OUTPATIENT)
Dept: FAMILY MEDICINE CLINIC | Facility: CLINIC | Age: 70
End: 2020-09-14

## 2020-09-14 VITALS
WEIGHT: 122.2 LBS | BODY MASS INDEX: 21.65 KG/M2 | HEIGHT: 63 IN | SYSTOLIC BLOOD PRESSURE: 100 MMHG | OXYGEN SATURATION: 98 % | HEART RATE: 100 BPM | DIASTOLIC BLOOD PRESSURE: 70 MMHG

## 2020-09-14 DIAGNOSIS — N90.89 VULVAR LESION: Primary | ICD-10-CM

## 2020-09-14 DIAGNOSIS — N89.8 VAGINAL ODOR: ICD-10-CM

## 2020-09-14 LAB
CANDIDA ALBICANS: NEGATIVE
GARDNERELLA VAGINALIS: NEGATIVE
T VAGINALIS DNA VAG QL PROBE+SIG AMP: NEGATIVE

## 2020-09-14 PROCEDURE — 87660 TRICHOMONAS VAGIN DIR PROBE: CPT | Performed by: GENERAL PRACTICE

## 2020-09-14 PROCEDURE — 87510 GARDNER VAG DNA DIR PROBE: CPT | Performed by: GENERAL PRACTICE

## 2020-09-14 PROCEDURE — 99213 OFFICE O/P EST LOW 20 MIN: CPT | Performed by: GENERAL PRACTICE

## 2020-09-14 PROCEDURE — 87480 CANDIDA DNA DIR PROBE: CPT | Performed by: GENERAL PRACTICE

## 2020-09-14 NOTE — PROGRESS NOTES
Subjective   Kalli Lowe is a 70 y.o. female.   Chief Complaint   Patient presents with   • Follow-up     eval place on vagina       History of Present Illness     Noticed a small black dot on her right labia 1 month ago. Not changing in size. Has bled a little. Not painful. Also has a vaginal odor, no discharge.    The following portions of the patient's history were reviewed and updated as appropriate: allergies, current medications, past social history and problem list.    Outpatient Medications Prior to Visit   Medication Sig Dispense Refill   • carbidopa-levodopa (SINEMET)  MG per tablet Take 1 tablet by mouth 3 (Three) Times a Day.     • citalopram (CeleXA) 10 MG tablet Take 1 tablet by mouth Daily. 90 tablet 3   • Denosumab (PROLIA SC) Inject  under the skin into the appropriate area as directed Every 6 (Six) Months.     • doxycycline (VIBRAMYCIN) 100 MG capsule Take 1 capsule by mouth Daily.     • triamcinolone (KENALOG) 0.025 % cream Apply  topically to the appropriate area as directed 2 (Two) Times a Day. 15 g 0     No facility-administered medications prior to visit.        Review of Systems   Constitutional: Negative.  Negative for chills, fatigue, fever and unexpected weight change.   HENT: Negative.  Negative for congestion, ear pain, hearing loss, nosebleeds, rhinorrhea, sneezing, sore throat and tinnitus.    Eyes: Negative.  Negative for discharge.   Respiratory: Negative.  Negative for cough, shortness of breath and wheezing.    Cardiovascular: Negative.  Negative for chest pain and palpitations.   Gastrointestinal: Negative.  Negative for abdominal pain, constipation, diarrhea, nausea and vomiting.   Endocrine: Negative.    Genitourinary: Negative.  Negative for dysuria, frequency and urgency.   Musculoskeletal: Negative.  Negative for arthralgias, back pain, joint swelling, myalgias and neck pain.   Skin: Negative.  Negative for rash.   Allergic/Immunologic: Negative.   "  Neurological: Negative.  Negative for dizziness, weakness, numbness and headaches.   Hematological: Negative.  Negative for adenopathy.   Psychiatric/Behavioral: Negative.  Negative for dysphoric mood and sleep disturbance. The patient is not nervous/anxious.        Objective   Visit Vitals  /70   Pulse 100   Ht 158.8 cm (62.5\")   Wt 55.4 kg (122 lb 3.2 oz)   LMP  (LMP Unknown)   SpO2 98%   BMI 21.99 kg/m²     Physical Exam  Vitals signs and nursing note reviewed.   Constitutional:       General: She is not in acute distress.     Appearance: She is well-developed.   HENT:      Head: Normocephalic and atraumatic.      Nose: Nose normal.   Eyes:      General:         Right eye: No discharge.         Left eye: No discharge.      Conjunctiva/sclera: Conjunctivae normal.      Pupils: Pupils are equal, round, and reactive to light.   Neck:      Thyroid: No thyromegaly.   Cardiovascular:      Rate and Rhythm: Normal rate and regular rhythm.      Heart sounds: Normal heart sounds.   Pulmonary:      Effort: Pulmonary effort is normal.      Breath sounds: Normal breath sounds.   Genitourinary:      Lymphadenopathy:      Cervical: No cervical adenopathy.   Skin:     General: Skin is warm and dry.   Neurological:      Mental Status: She is alert and oriented to person, place, and time.         Notes brought forward are reviewed and updated if indicated.     Assessment/Plan   Problem List Items Addressed This Visit     None      Visit Diagnoses     Vulvar lesion    -  Primary    Vaginal odor        Relevant Orders    Gardnerella vaginalis, Trichomonas vaginalis, Candida albicans, DNA - Swab, Vagina          Will notify regarding results. Reassured, will recheck lesion at next visit in 3 months, sooner if changing in any way.     No orders of the defined types were placed in this encounter.    Return if symptoms worsen or fail to improve, for Next scheduled follow up.  "

## 2020-09-15 ENCOUNTER — TELEPHONE (OUTPATIENT)
Dept: FAMILY MEDICINE CLINIC | Facility: CLINIC | Age: 70
End: 2020-09-15

## 2020-09-15 NOTE — TELEPHONE ENCOUNTER
Per , Ms. Lowe has been called with recent lab results & recommendations.  Continue current medications and follow-up as planned or sooner if any problems.      ----- Message from Tiffanie Hidalgo MD sent at 9/14/2020  9:02 PM CDT -----  Call and tell negative

## 2020-09-15 NOTE — PROGRESS NOTES
Per , Ms. Lowe has been called with recent lab results & recommendations.  Continue current medications and follow-up as planned or sooner if any problems.

## 2021-02-06 DIAGNOSIS — G20 PARKINSON DISEASE (HCC): Chronic | ICD-10-CM

## 2021-02-08 RX ORDER — CITALOPRAM 10 MG/1
10 TABLET ORAL DAILY
Qty: 90 TABLET | Refills: 0 | Status: SHIPPED | OUTPATIENT
Start: 2021-02-08 | End: 2021-05-10

## 2021-02-17 ENCOUNTER — TELEPHONE (OUTPATIENT)
Dept: ONCOLOGY | Facility: CLINIC | Age: 71
End: 2021-02-17

## 2021-02-17 NOTE — TELEPHONE ENCOUNTER
Caller: MAREN HAM    Relationship to patient: SELF    Best call back number: [phone]    PT IS SCHEDULED FOR A PROLIA INJECTION ON 02/19, BUT STATES SHE FORGOT TO GET THE LAB WORK FOR THIS DONE LAST WEEK. PT IS ASKING IF IT IS STILL OKAY TO COME IN FOR THIS APPT OR WILL SHE NEED TO RESCHEDULE. PLEASE ADVISE

## 2021-02-19 ENCOUNTER — APPOINTMENT (OUTPATIENT)
Dept: ONCOLOGY | Facility: HOSPITAL | Age: 71
End: 2021-02-19

## 2021-02-22 ENCOUNTER — LAB (OUTPATIENT)
Dept: ONCOLOGY | Facility: HOSPITAL | Age: 71
End: 2021-02-22

## 2021-02-22 DIAGNOSIS — M85.80 OSTEOPENIA, UNSPECIFIED LOCATION: ICD-10-CM

## 2021-02-22 LAB — CALCIUM SPEC-SCNC: 9.6 MG/DL (ref 8.6–10.5)

## 2021-02-22 PROCEDURE — 36415 COLL VENOUS BLD VENIPUNCTURE: CPT | Performed by: NURSE PRACTITIONER

## 2021-02-22 PROCEDURE — 82310 ASSAY OF CALCIUM: CPT

## 2021-02-24 ENCOUNTER — INFUSION (OUTPATIENT)
Dept: ONCOLOGY | Facility: HOSPITAL | Age: 71
End: 2021-02-24

## 2021-02-24 VITALS — TEMPERATURE: 97.5 F | HEART RATE: 94 BPM | DIASTOLIC BLOOD PRESSURE: 50 MMHG | SYSTOLIC BLOOD PRESSURE: 99 MMHG

## 2021-02-24 DIAGNOSIS — M85.80 OSTEOPENIA, UNSPECIFIED LOCATION: ICD-10-CM

## 2021-02-24 DIAGNOSIS — M19.90 OSTEOARTHRITIS, UNSPECIFIED OSTEOARTHRITIS TYPE, UNSPECIFIED SITE: Primary | ICD-10-CM

## 2021-02-24 PROCEDURE — 96372 THER/PROPH/DIAG INJ SC/IM: CPT

## 2021-02-24 PROCEDURE — 36415 COLL VENOUS BLD VENIPUNCTURE: CPT | Performed by: NURSE PRACTITIONER

## 2021-02-24 PROCEDURE — 25010000002 DENOSUMAB 60 MG/ML SOLUTION PREFILLED SYRINGE: Performed by: GENERAL PRACTICE

## 2021-02-24 RX ADMIN — DENOSUMAB 60 MG: 60 INJECTION SUBCUTANEOUS at 15:37

## 2021-03-30 ENCOUNTER — OFFICE VISIT (OUTPATIENT)
Dept: OTOLARYNGOLOGY | Facility: CLINIC | Age: 71
End: 2021-03-30

## 2021-03-30 ENCOUNTER — CLINICAL SUPPORT (OUTPATIENT)
Dept: AUDIOLOGY | Facility: CLINIC | Age: 71
End: 2021-03-30

## 2021-03-30 VITALS — BODY MASS INDEX: 21.93 KG/M2 | WEIGHT: 123.8 LBS | OXYGEN SATURATION: 98 % | TEMPERATURE: 98 F | HEIGHT: 63 IN

## 2021-03-30 DIAGNOSIS — H93.13 TINNITUS OF BOTH EARS: ICD-10-CM

## 2021-03-30 DIAGNOSIS — H90.3 SENSORINEURAL HEARING LOSS OF BOTH EARS: Primary | ICD-10-CM

## 2021-03-30 DIAGNOSIS — H90.3 SENSORINEURAL HEARING LOSS, BILATERAL: Primary | ICD-10-CM

## 2021-03-30 PROCEDURE — 92557 COMPREHENSIVE HEARING TEST: CPT | Performed by: AUDIOLOGIST

## 2021-03-30 PROCEDURE — 99213 OFFICE O/P EST LOW 20 MIN: CPT | Performed by: OTOLARYNGOLOGY

## 2021-03-30 PROCEDURE — 92567 TYMPANOMETRY: CPT | Performed by: AUDIOLOGIST

## 2021-03-30 NOTE — PROGRESS NOTES
STANDARD AUDIOMETRIC EVALUATION      Name:  Kalli Lowe  :  1950  Age:  71 y.o.  Date of Evaluation:  3/30/2021      HISTORY    Reason for visit:  Kalli Lowe is seen today for a yearly hearing test at the request of Dr. Dimas Corcoran.  Patient reports she has not had any changes in her hearing.  She states she hears sounds like crickets in her ears.       EVALUATION    See Audiogram    RESULTS        Otoscopy and Tympanometry 226 Hz :  Right Ear:  Otoscopy:  Testing completed after ears were examined by the ENT physician          Tympanometry:  Middle ear function within normal limits    Left Ear:   Otoscopy:  Testing completed after ears were examined by the ENT physician        Tympanometry:  Reduced pressure and compliance consistent with outer/middle ear involvement    Test technique:  Standard Audiometry     Pure Tone Audiometry:   Patient responded to pure tones at 30-55 dB for 250-8000 Hz in right ear, and at 30-75 dB for 250-8000 Hz in left ear.       Speech Audiometry:        Right Ear:  Speech Reception Threshold (SRT) was obtained at 35 dBHL                 Speech Discrimination scores were 100% in quiet when words were presented at 75 dBHL       Left Ear:  Speech Reception Threshold (SRT) was obtained at 35 dBHL                 Speech Discrimination scores were 100% in quiet when words were presented at 75 dBHL    Reliability:   good    IMPRESSIONS:  1.  Tympanometry results are consistent with Middle ear function within normal limits in right ear, and Reduced pressure and compliance consistent with outer/middle ear involvement in left ear.  2.  Pure tone results are consistent with mild to moderate sloping sensorineural hearing loss  for right ear, and mild to moderately severe sloping sensorineural hearing loss  in left ear.       RECOMMENDATIONS:  Patient is seeing the Ear Nose and Throat physician immediately following this examination.  It was a pleasure  seeing Kalli Lowe in Audiology today.  We would be happy to do further testing or discuss these test as necessary.          This document has been electronically signed by July Madrid MS CCC-A on March 30, 2021 11:45 CDT       July Madrid MS CCC-A  Licensed Audiologist

## 2021-04-02 NOTE — PROGRESS NOTES
Subjective   Kalli Lowe is a 71 y.o. female.       History of Present Illness   Former patient of Dr. Kline is followed with binaural hearing loss.  States her hearing has not changed that she can tell but she is having more trouble understanding people since the pandemic with widespread mask use.  No otorrhea, no previous otologic surgery.  Has binaural tinnitus that sounds like crickets.      The following portions of the patient's history were reviewed and updated as appropriate: allergies, current medications, past family history, past medical history, past social history, past surgical history and problem list.     reports that she has never smoked. She has never used smokeless tobacco. She reports that she does not drink alcohol and does not use drugs.   Patient is not a tobacco user and has not been counseled for use of tobacco products      Review of Systems        Objective   Physical Exam  Ears: External ears no deformity, right ear canal shows some partially obstructing wax is removed.  Beyond this tympanic membrane is intact and clear.  Left ear no discharge.  Tympanic membrane intact and clear    Audiogram is obtained and reviewed and shows mild to moderate sensorineural hearing loss bilaterally.  Slightly worse on the left than the right in a few frequencies.  Tympanograms type a on the right flat on the left but this does not correspond to clinical exam.  Compared to her last hearing test in November 2019 her hearing is slightly decreased.      Assessment/Plan   Diagnoses and all orders for this visit:    1. Sensorineural hearing loss of both ears (Primary)        Plan: Explained findings to patient.  Told her she could consider amplification if she so desires.  Use masking noise as needed for the tinnitus.  Return in 1 year with another hearing test, call sooner for problems.

## 2021-05-08 DIAGNOSIS — G20 PARKINSON DISEASE (HCC): Chronic | ICD-10-CM

## 2021-05-10 ENCOUNTER — TELEPHONE (OUTPATIENT)
Dept: FAMILY MEDICINE CLINIC | Facility: CLINIC | Age: 71
End: 2021-05-10

## 2021-05-10 DIAGNOSIS — G20 PARKINSON DISEASE (HCC): Primary | ICD-10-CM

## 2021-05-10 DIAGNOSIS — Z00.00 MEDICARE ANNUAL WELLNESS VISIT, SUBSEQUENT: ICD-10-CM

## 2021-05-10 DIAGNOSIS — E78.5 HYPERLIPIDEMIA, UNSPECIFIED HYPERLIPIDEMIA TYPE: ICD-10-CM

## 2021-05-10 DIAGNOSIS — Z13.220 SCREENING FOR HYPERLIPIDEMIA: ICD-10-CM

## 2021-05-10 RX ORDER — CITALOPRAM 10 MG/1
10 TABLET ORAL DAILY
Qty: 90 TABLET | Refills: 0 | Status: SHIPPED | OUTPATIENT
Start: 2021-05-10 | End: 2021-06-24 | Stop reason: SDUPTHER

## 2021-05-17 DIAGNOSIS — Z12.31 ENCOUNTER FOR SCREENING MAMMOGRAM FOR MALIGNANT NEOPLASM OF BREAST: Primary | ICD-10-CM

## 2021-06-10 NOTE — TELEPHONE ENCOUNTER
Can you take care of this?  
Do you know anything about this?  
I will fax last note and any xrays.   
MAREN FATOUMATA HAD ASKED FOR REF TO BE SENT TO [facility] ON 1/21/19 WHICH LOOKS LIKE REF WAS FX BUT THEY ARE STILL NEEDING RECORDS PERTAINING TO HER PROBLEM TO BE FX  [phone] ATTN: RADHIKA  MRS HAM'S NUMBER   
[phone]

## 2021-06-22 ENCOUNTER — LAB (OUTPATIENT)
Dept: LAB | Facility: HOSPITAL | Age: 71
End: 2021-06-22

## 2021-06-22 DIAGNOSIS — Z00.00 MEDICARE ANNUAL WELLNESS VISIT, SUBSEQUENT: ICD-10-CM

## 2021-06-22 DIAGNOSIS — G20 PARKINSON DISEASE (HCC): ICD-10-CM

## 2021-06-22 DIAGNOSIS — Z13.220 SCREENING FOR HYPERLIPIDEMIA: ICD-10-CM

## 2021-06-22 DIAGNOSIS — E78.5 HYPERLIPIDEMIA, UNSPECIFIED HYPERLIPIDEMIA TYPE: ICD-10-CM

## 2021-06-22 LAB
ALBUMIN SERPL-MCNC: 4.4 G/DL (ref 3.5–5.2)
ALBUMIN/GLOB SERPL: 1.8 G/DL
ALP SERPL-CCNC: 77 U/L (ref 39–117)
ALT SERPL W P-5'-P-CCNC: 9 U/L (ref 1–33)
ANION GAP SERPL CALCULATED.3IONS-SCNC: 7.7 MMOL/L (ref 5–15)
AST SERPL-CCNC: 23 U/L (ref 1–32)
BILIRUB SERPL-MCNC: 0.5 MG/DL (ref 0–1.2)
BUN SERPL-MCNC: 16 MG/DL (ref 8–23)
BUN/CREAT SERPL: 17.2 (ref 7–25)
CALCIUM SPEC-SCNC: 9.1 MG/DL (ref 8.6–10.5)
CHLORIDE SERPL-SCNC: 103 MMOL/L (ref 98–107)
CHOLEST SERPL-MCNC: 193 MG/DL (ref 0–200)
CO2 SERPL-SCNC: 27.3 MMOL/L (ref 22–29)
CREAT SERPL-MCNC: 0.93 MG/DL (ref 0.57–1)
GFR SERPL CREATININE-BSD FRML MDRD: 59 ML/MIN/1.73
GLOBULIN UR ELPH-MCNC: 2.4 GM/DL
GLUCOSE SERPL-MCNC: 84 MG/DL (ref 65–99)
HDLC SERPL-MCNC: 65 MG/DL (ref 40–60)
LDLC SERPL CALC-MCNC: 87 MG/DL (ref 0–100)
LDLC/HDLC SERPL: 1.2 {RATIO}
POTASSIUM SERPL-SCNC: 4.6 MMOL/L (ref 3.5–5.2)
PROT SERPL-MCNC: 6.8 G/DL (ref 6–8.5)
SODIUM SERPL-SCNC: 138 MMOL/L (ref 136–145)
TRIGL SERPL-MCNC: 249 MG/DL (ref 0–150)
VLDLC SERPL-MCNC: 41 MG/DL (ref 5–40)

## 2021-06-22 PROCEDURE — 81001 URINALYSIS AUTO W/SCOPE: CPT

## 2021-06-22 PROCEDURE — 80061 LIPID PANEL: CPT

## 2021-06-22 PROCEDURE — 80053 COMPREHEN METABOLIC PANEL: CPT

## 2021-06-22 PROCEDURE — 36415 COLL VENOUS BLD VENIPUNCTURE: CPT

## 2021-06-23 LAB
BACTERIA UR QL AUTO: ABNORMAL /HPF
BILIRUB UR QL STRIP: NEGATIVE
CLARITY UR: CLEAR
COLOR UR: YELLOW
GLUCOSE UR STRIP-MCNC: NEGATIVE MG/DL
HGB UR QL STRIP.AUTO: NEGATIVE
HYALINE CASTS UR QL AUTO: ABNORMAL /LPF
KETONES UR QL STRIP: NEGATIVE
LEUKOCYTE ESTERASE UR QL STRIP.AUTO: ABNORMAL
NITRITE UR QL STRIP: NEGATIVE
PH UR STRIP.AUTO: 7 [PH] (ref 5–8)
PROT UR QL STRIP: NEGATIVE
RBC # UR: ABNORMAL /HPF
REF LAB TEST METHOD: ABNORMAL
SP GR UR STRIP: 1.01 (ref 1–1.03)
SQUAMOUS #/AREA URNS HPF: ABNORMAL /HPF
UROBILINOGEN UR QL STRIP: ABNORMAL
WBC UR QL AUTO: ABNORMAL /HPF

## 2021-06-24 ENCOUNTER — OFFICE VISIT (OUTPATIENT)
Dept: FAMILY MEDICINE CLINIC | Facility: CLINIC | Age: 71
End: 2021-06-24

## 2021-06-24 VITALS
WEIGHT: 120.1 LBS | SYSTOLIC BLOOD PRESSURE: 124 MMHG | OXYGEN SATURATION: 98 % | BODY MASS INDEX: 21.28 KG/M2 | HEIGHT: 63 IN | DIASTOLIC BLOOD PRESSURE: 80 MMHG | HEART RATE: 81 BPM

## 2021-06-24 DIAGNOSIS — Z00.00 MEDICARE ANNUAL WELLNESS VISIT, SUBSEQUENT: Primary | ICD-10-CM

## 2021-06-24 DIAGNOSIS — G20 PARKINSON DISEASE (HCC): Chronic | ICD-10-CM

## 2021-06-24 DIAGNOSIS — Z12.31 ENCOUNTER FOR SCREENING MAMMOGRAM FOR MALIGNANT NEOPLASM OF BREAST: ICD-10-CM

## 2021-06-24 PROCEDURE — 1160F RVW MEDS BY RX/DR IN RCRD: CPT | Performed by: GENERAL PRACTICE

## 2021-06-24 PROCEDURE — G0439 PPPS, SUBSEQ VISIT: HCPCS | Performed by: GENERAL PRACTICE

## 2021-06-24 PROCEDURE — 1170F FXNL STATUS ASSESSED: CPT | Performed by: GENERAL PRACTICE

## 2021-06-24 RX ORDER — CITALOPRAM 10 MG/1
10 TABLET ORAL DAILY
Qty: 90 TABLET | Refills: 3 | Status: SHIPPED | OUTPATIENT
Start: 2021-06-24 | End: 2022-07-05 | Stop reason: SDUPTHER

## 2021-06-24 NOTE — PATIENT INSTRUCTIONS
Check on tetanus/pertussis vaccine at pharmacy or with insurance.      Medicare Wellness  Personal Prevention Plan of Service     Date of Office Visit:  2021  Encounter Provider:  Tiffanie Hidalgo MD  Place of Service:  [facility]  Patient Name: Kalli Lowe  :  1950    As part of the Medicare Wellness portion of your visit today, we are providing you with this personalized preventive plan of services (PPPS). This plan is based upon recommendations of the [place] Preventive Services Task Force (USPSTF) and the Advisory Committee on Immunization Practices (ACIP).    This lists the preventive care services that should be considered, and provides dates of when you are due. Items listed as completed are up-to-date and do not require any further intervention.    Health Maintenance   Topic Date Due   • TDAP/TD VACCINES (2 - Td or Tdap) 2019   • ANNUAL WELLNESS VISIT  2020   • INFLUENZA VACCINE  2021   • MAMMOGRAM  2021   • DXA SCAN  2021   • LIPID PANEL  2022   • COLORECTAL CANCER SCREENING  2023   • HEPATITIS C SCREENING  Completed   • COVID-19 Vaccine  Completed   • Pneumococcal Vaccine 65+  Completed   • ZOSTER VACCINE  Addressed       No orders of the defined types were placed in this encounter.      No follow-ups on file.

## 2021-06-24 NOTE — PROGRESS NOTES
The ABCs of the Annual Wellness Visit  Subsequent Medicare Wellness Visit    Chief Complaint   Patient presents with   • Annual Exam     rufino   • Medicare Wellness-subsequent       Subjective   History of Present Illness:  Kalli Lowe is a 71 y.o. female who presents for a Subsequent Medicare Wellness Visit. Due for mammogram.      HEALTH RISK ASSESSMENT    Recent Hospitalizations:  No hospitalization(s) within the last year.    Current Medical Providers:  Patient Care Team:  Tiffanie Hidalgo MD as PCP - General  [facility], Miguel LEBRON MD as Surgeon (General Surgery)  [place], Mckinley MICHAUD MD as Consulting Physician (Urology)    Smoking Status:  Social History     Tobacco Use   Smoking Status Never Smoker   Smokeless Tobacco Never Used       Alcohol Consumption:  Social History     Substance and Sexual Activity   Alcohol Use No       Depression Screen:   PHQ-2/PHQ-9 Depression Screening 6/24/2021   Little interest or pleasure in doing things 0   Feeling down, depressed, or hopeless 0   Trouble falling or staying asleep, or sleeping too much 0   Feeling tired or having little energy 1   Poor appetite or overeating 0   Feeling bad about yourself - or that you are a failure or have let yourself or your family down 0   Trouble concentrating on things, such as reading the newspaper or watching television 0   Moving or speaking so slowly that other people could have noticed. Or the opposite - being so fidgety or restless that you have been moving around a lot more than usual 0   Thoughts that you would be better off dead, or of hurting yourself in some way 0   Total Score 1   If you checked off any problems, how difficult have these problems made it for you to do your work, take care of things at home, or get along with other people? Not difficult at all       Fall Risk Screen:  STEADI Fall Risk Assessment was completed, and patient is at LOW risk for falls.Assessment completed on:6/24/2021    Health Habits and  Functional and Cognitive Screening:  Functional & Cognitive Status 6/24/2021   Do you have difficulty preparing food and eating? No   Do you have difficulty bathing yourself, getting dressed or grooming yourself? No   Do you have difficulty using the toilet? No   Do you have difficulty moving around from place to place? No   Do you have trouble with steps or getting out of a bed or a chair? No   Current Diet Well Balanced Diet   Dental Exam Up to date   Eye Exam Up to date   Exercise (times per week) 0 times per week   Current Exercises Include No Regular Exercise   Current Exercise Activities Include -   Do you need help using the phone?  No   Are you deaf or do you have serious difficulty hearing?  No   Do you need help with transportation? No   Do you need help shopping? No   Do you need help preparing meals?  No   Do you need help with housework?  No   Do you need help with laundry? No   Do you need help taking your medications? No   Do you need help managing money? No   Do you ever drive or ride in a car without wearing a seat belt? No   Have you felt unusual stress, anger or loneliness in the last month? No   Who do you live with? Spouse   If you need help, do you have trouble finding someone available to you? No   Have you been bothered in the last four weeks by sexual problems? No   Do you have difficulty concentrating, remembering or making decisions? Yes         Does the patient have evidence of cognitive impairment? No    Asprin use counseling:Does not need ASA (and currently is not on it)    Age-appropriate Screening Schedule:  Refer to the list below for future screening recommendations based on patient's age, sex and/or medical conditions. Orders for these recommended tests are listed in the plan section. The patient has been provided with a written plan.    Health Maintenance   Topic Date Due   • TDAP/TD VACCINES (2 - Td or Tdap) 04/02/2019   • INFLUENZA VACCINE  08/01/2021   • MAMMOGRAM  12/13/2021    • DXA SCAN  12/13/2021   • LIPID PANEL  06/22/2022   • ZOSTER VACCINE  Addressed          The following portions of the patient's history were reviewed and updated as appropriate: allergies, current medications, past family history, past medical history, past social history, past surgical history and problem list.    Outpatient Medications Prior to Visit   Medication Sig Dispense Refill   • carbidopa-levodopa (SINEMET)  MG per tablet Take 1 tablet by mouth 3 (Three) Times a Day.     • Denosumab (PROLIA SC) Inject  under the skin into the appropriate area as directed Every 6 (Six) Months.     • doxycycline (VIBRAMYCIN) 100 MG capsule Take 1 capsule by mouth Daily.     • citalopram (CeleXA) 10 MG tablet TAKE 1 TABLET BY MOUTH DAILY. 90 tablet 0     No facility-administered medications prior to visit.       Patient Active Problem List   Diagnosis   • Osteoarthritis   • Osteopenia   • Screen for colon cancer   • Depressive disorder   • Parkinson disease (CMS/[facility])       Advanced Care Planning:  ACP discussion was held with the patient during this visit. Patient has an advance directive in EMR which is still valid.     Review of Systems   Constitutional: Negative.  Negative for chills, fatigue, fever and unexpected weight change.   HENT: Negative.  Negative for congestion, ear pain, hearing loss, nosebleeds, rhinorrhea, sneezing, sore throat and tinnitus.    Eyes: Negative.  Negative for discharge.   Respiratory: Negative.  Negative for cough, shortness of breath and wheezing.    Cardiovascular: Negative.  Negative for chest pain and palpitations.   Gastrointestinal: Negative.  Negative for abdominal pain, constipation, diarrhea, nausea and vomiting.   Endocrine: Negative.    Genitourinary: Negative.  Negative for dysuria, frequency and urgency.   Musculoskeletal: Negative.  Negative for arthralgias, back pain, joint swelling, myalgias and neck pain.   Skin: Negative.  Negative for rash.   Allergic/Immunologic:  "Negative.    Neurological: Negative.  Negative for dizziness, weakness, numbness and headaches.   Hematological: Negative.  Negative for adenopathy.   Psychiatric/Behavioral: Negative.  Negative for dysphoric mood and sleep disturbance. The patient is not nervous/anxious.        Compared to one year ago, the patient feels her physical health is the same.  Compared to one year ago, the patient feels her mental health is the same.    Reviewed chart for potential of high risk medication in the elderly: not applicable  Reviewed chart for potential of harmful drug interactions in the elderly:not applicable    Objective         Vitals:    06/24/21 1510   BP: 124/80   Pulse: 81   SpO2: 98%   Weight: 54.5 kg (120 lb 1.6 oz)   Height: 158.8 cm (62.5\")   PainSc: 0-No pain       Body mass index is 21.62 kg/m².  Discussed the patient's BMI with her. The BMI is in the acceptable range.    Physical Exam  Vitals and nursing note reviewed.   Constitutional:       General: She is not in acute distress.     Appearance: She is well-developed.   HENT:      Head: Normocephalic and atraumatic.      Nose: Nose normal.   Eyes:      General:         Right eye: No discharge.         Left eye: No discharge.      Conjunctiva/sclera: Conjunctivae normal.      Pupils: Pupils are equal, round, and reactive to light.   Neck:      Thyroid: No thyromegaly.      Trachea: No tracheal deviation.   Cardiovascular:      Rate and Rhythm: Normal rate and regular rhythm.      Heart sounds: Normal heart sounds. No murmur heard.     Pulmonary:      Effort: Pulmonary effort is normal. No respiratory distress.      Breath sounds: Normal breath sounds. No wheezing or rales.   Chest:      Chest wall: No tenderness.      Breasts:         Right: No inverted nipple, mass, nipple discharge, skin change or tenderness.         Left: No inverted nipple, mass, nipple discharge, skin change or tenderness.   Abdominal:      General: Bowel sounds are normal. There is no " distension.      Palpations: Abdomen is soft. There is no mass.      Tenderness: There is no abdominal tenderness.      Hernia: No hernia is present.   Musculoskeletal:         General: No deformity. Normal range of motion.   Lymphadenopathy:      Cervical: No cervical adenopathy.   Skin:     General: Skin is warm and dry.   Neurological:      Mental Status: She is alert and oriented to person, place, and time.      Motor: Tremor (right hand) present.      Deep Tendon Reflexes: Reflexes are normal and symmetric.   Psychiatric:         Behavior: Behavior normal.         Thought Content: Thought content normal.         Judgment: Judgment normal.         Lab Results   Component Value Date    TRIG 249 (H) 06/22/2021    HDL 65 (H) 06/22/2021    LDL 87 06/22/2021    VLDL 41 (H) 06/22/2021        Assessment/Plan   Medicare Risks and Personalized Health Plan  CMS Preventative Services Quick Reference  Advance Directive Discussion  Breast Cancer/Mammogram Screening  Fall Risk  Immunizations Discussed/Encouraged (specific immunizations; Tdap )    The above risks/problems have been discussed with the patient.  Pertinent information has been shared with the patient in the After Visit Summary.  Follow up plans and orders are seen below in the Assessment/Plan Section.    Diagnoses and all orders for this visit:    1. Medicare annual wellness visit, subsequent (Primary)    2. Parkinson disease (CMS/[facility])  -     citalopram (CeleXA) 10 MG tablet; Take 1 tablet by mouth Daily.  Dispense: 90 tablet; Refill: 3    3. Encounter for screening mammogram for malignant neoplasm of breast      Follow Up:  Return in about 1 year (around 6/24/2022) for Annual physical, medicare wellness visit.     An After Visit Summary and PPPS were given to the patient.    Information has been scanned into chart. Discussed importance of taking medications as prescribed. Encouraged healthy eating habits with low fat, low salt choices and working towards  maintaining a healthy weight. Recommended regular exercise if able as well as care to prevent falls including avoiding anything on the floor that they could slip or trip on such as throw rugs, making sure they have a bathmat to step onto when their feet are wet and having grab bars and railings where needed.

## 2021-08-18 ENCOUNTER — APPOINTMENT (OUTPATIENT)
Dept: ONCOLOGY | Facility: HOSPITAL | Age: 71
End: 2021-08-18

## 2021-08-18 ENCOUNTER — LAB (OUTPATIENT)
Dept: LAB | Facility: HOSPITAL | Age: 71
End: 2021-08-18

## 2021-08-18 DIAGNOSIS — M85.80 OSTEOPENIA, UNSPECIFIED LOCATION: ICD-10-CM

## 2021-08-18 DIAGNOSIS — M85.80 OSTEOPENIA, UNSPECIFIED LOCATION: Primary | ICD-10-CM

## 2021-08-18 PROBLEM — M81.0 AGE-RELATED OSTEOPOROSIS WITHOUT CURRENT PATHOLOGICAL FRACTURE: Status: ACTIVE | Noted: 2021-08-18

## 2021-08-18 LAB
CALCIUM SPEC-SCNC: 9.1 MG/DL (ref 8.6–10.5)
MAGNESIUM SERPL-MCNC: 2.1 MG/DL (ref 1.6–2.4)
PHOSPHATE SERPL-MCNC: 3.3 MG/DL (ref 2.5–4.5)

## 2021-08-18 PROCEDURE — 36415 COLL VENOUS BLD VENIPUNCTURE: CPT

## 2021-08-18 PROCEDURE — 84100 ASSAY OF PHOSPHORUS: CPT

## 2021-08-18 PROCEDURE — 82310 ASSAY OF CALCIUM: CPT

## 2021-08-18 PROCEDURE — 83735 ASSAY OF MAGNESIUM: CPT

## 2021-08-25 ENCOUNTER — INFUSION (OUTPATIENT)
Dept: ONCOLOGY | Facility: HOSPITAL | Age: 71
End: 2021-08-25

## 2021-08-25 VITALS
SYSTOLIC BLOOD PRESSURE: 100 MMHG | TEMPERATURE: 96.5 F | HEART RATE: 76 BPM | RESPIRATION RATE: 16 BRPM | DIASTOLIC BLOOD PRESSURE: 58 MMHG

## 2021-08-25 DIAGNOSIS — M85.80 OSTEOPENIA, UNSPECIFIED LOCATION: ICD-10-CM

## 2021-08-25 DIAGNOSIS — M81.0 AGE-RELATED OSTEOPOROSIS WITHOUT CURRENT PATHOLOGICAL FRACTURE: Primary | ICD-10-CM

## 2021-08-25 PROCEDURE — 25010000002 DENOSUMAB 60 MG/ML SOLUTION PREFILLED SYRINGE: Performed by: GENERAL PRACTICE

## 2021-08-25 PROCEDURE — 96372 THER/PROPH/DIAG INJ SC/IM: CPT | Performed by: NURSE PRACTITIONER

## 2021-08-25 RX ADMIN — DENOSUMAB 60 MG: 60 INJECTION SUBCUTANEOUS at 14:58

## 2022-01-01 NOTE — TELEPHONE ENCOUNTER
Last OV 06/14/2020    No Future OV Esther   To Dr. Sheffield  Please advise. Thank you!    Mom called and stated patient has been dealing with jaundice and Dr. Cheng recommended mom supplement with formula, but patient hasn't been tolerating it well.    Mom states initially after birth, she supplemented with similac sensitive and patient tolerated it fine. Then for the last 2 weeks patient took breastmilk only (through breastfeeding and through bottle) and patient tolerated without problem.  Mom recently tried supplementing with similac sensitive again and patient gets very fussy when offered the formula, and mom states she will take about an ounce, then throw it all right back up. Mom states this happens every time she takes the formula. Mom will then nurse patient, and patient will tolerate the breastmilk without any spit up/ vomiting.  Mom states sometimes she will try to give the formula again after a few minutes, and once patient drinks about an ounce, she will throw it all right back up again. It comes out of her nose and mouth.    Mom states she doesn't know what to do. Should she try a different formula? Mom doesn't think the bottle is the problem because patient takes breastmilk through a bottle without incident.    Mom states patient has no other signs or symptoms of illness. No congestion/ fevers/ runny nose. No blood in her vomit or stool.    Advised I will have Dr. Sheffield review information and will call back with her recommendations. Parent agreeable to plan and verbalized understanding of all information given.

## 2022-02-02 DIAGNOSIS — Z12.31 ENCOUNTER FOR SCREENING MAMMOGRAM FOR MALIGNANT NEOPLASM OF BREAST: ICD-10-CM

## 2022-02-02 DIAGNOSIS — Z78.0 POST-MENOPAUSAL: Primary | ICD-10-CM

## 2022-02-22 ENCOUNTER — LAB (OUTPATIENT)
Dept: LAB | Facility: HOSPITAL | Age: 72
End: 2022-02-22

## 2022-02-22 DIAGNOSIS — M85.80 OSTEOPENIA, UNSPECIFIED LOCATION: ICD-10-CM

## 2022-02-22 DIAGNOSIS — M19.90 OSTEOARTHRITIS, UNSPECIFIED OSTEOARTHRITIS TYPE, UNSPECIFIED SITE: ICD-10-CM

## 2022-02-22 DIAGNOSIS — M19.90 OSTEOARTHRITIS, UNSPECIFIED OSTEOARTHRITIS TYPE, UNSPECIFIED SITE: Primary | ICD-10-CM

## 2022-02-22 LAB
ALBUMIN SERPL-MCNC: 4.5 G/DL (ref 3.5–5.2)
ALBUMIN/GLOB SERPL: 2.1 G/DL
ALP SERPL-CCNC: 92 U/L (ref 39–117)
ALT SERPL W P-5'-P-CCNC: 14 U/L (ref 1–33)
ANION GAP SERPL CALCULATED.3IONS-SCNC: 10 MMOL/L (ref 5–15)
AST SERPL-CCNC: 25 U/L (ref 1–32)
BILIRUB SERPL-MCNC: 0.3 MG/DL (ref 0–1.2)
BUN SERPL-MCNC: 18 MG/DL (ref 8–23)
BUN/CREAT SERPL: 19.1 (ref 7–25)
CALCIUM SPEC-SCNC: 9.2 MG/DL (ref 8.6–10.5)
CHLORIDE SERPL-SCNC: 103 MMOL/L (ref 98–107)
CO2 SERPL-SCNC: 27 MMOL/L (ref 22–29)
CREAT SERPL-MCNC: 0.94 MG/DL (ref 0.57–1)
GFR SERPL CREATININE-BSD FRML MDRD: 58 ML/MIN/1.73
GLOBULIN UR ELPH-MCNC: 2.1 GM/DL
GLUCOSE SERPL-MCNC: 130 MG/DL (ref 65–99)
MAGNESIUM SERPL-MCNC: 2.2 MG/DL (ref 1.6–2.4)
PHOSPHATE SERPL-MCNC: 3.6 MG/DL (ref 2.5–4.5)
POTASSIUM SERPL-SCNC: 4.1 MMOL/L (ref 3.5–5.2)
PROT SERPL-MCNC: 6.6 G/DL (ref 6–8.5)
SODIUM SERPL-SCNC: 140 MMOL/L (ref 136–145)

## 2022-02-22 PROCEDURE — 80053 COMPREHEN METABOLIC PANEL: CPT

## 2022-02-22 PROCEDURE — 83735 ASSAY OF MAGNESIUM: CPT

## 2022-02-22 PROCEDURE — 84100 ASSAY OF PHOSPHORUS: CPT

## 2022-02-23 ENCOUNTER — TELEPHONE (OUTPATIENT)
Dept: FAMILY MEDICINE CLINIC | Facility: CLINIC | Age: 72
End: 2022-02-23

## 2022-02-28 ENCOUNTER — INFUSION (OUTPATIENT)
Dept: ONCOLOGY | Facility: HOSPITAL | Age: 72
End: 2022-02-28

## 2022-02-28 VITALS
TEMPERATURE: 97.1 F | SYSTOLIC BLOOD PRESSURE: 95 MMHG | RESPIRATION RATE: 18 BRPM | HEART RATE: 103 BPM | DIASTOLIC BLOOD PRESSURE: 42 MMHG

## 2022-02-28 DIAGNOSIS — M85.80 OSTEOPENIA, UNSPECIFIED LOCATION: ICD-10-CM

## 2022-02-28 DIAGNOSIS — M19.90 OSTEOARTHRITIS, UNSPECIFIED OSTEOARTHRITIS TYPE, UNSPECIFIED SITE: Primary | ICD-10-CM

## 2022-02-28 PROCEDURE — 96372 THER/PROPH/DIAG INJ SC/IM: CPT | Performed by: NURSE PRACTITIONER

## 2022-02-28 PROCEDURE — 25010000002 DENOSUMAB 60 MG/ML SOLUTION PREFILLED SYRINGE: Performed by: GENERAL PRACTICE

## 2022-02-28 RX ADMIN — DENOSUMAB 60 MG: 60 INJECTION SUBCUTANEOUS at 14:00

## 2022-06-14 ENCOUNTER — CLINICAL SUPPORT (OUTPATIENT)
Dept: AUDIOLOGY | Facility: CLINIC | Age: 72
End: 2022-06-14

## 2022-06-14 ENCOUNTER — OFFICE VISIT (OUTPATIENT)
Dept: OTOLARYNGOLOGY | Facility: CLINIC | Age: 72
End: 2022-06-14

## 2022-06-14 VITALS — OXYGEN SATURATION: 98 % | BODY MASS INDEX: 20.45 KG/M2 | HEIGHT: 63 IN | WEIGHT: 115.4 LBS

## 2022-06-14 DIAGNOSIS — H90.3 SENSORINEURAL HEARING LOSS (SNHL) OF BOTH EARS: Primary | ICD-10-CM

## 2022-06-14 DIAGNOSIS — H90.3 SENSORINEURAL HEARING LOSS OF BOTH EARS: Primary | ICD-10-CM

## 2022-06-14 DIAGNOSIS — H93.13 TINNITUS OF BOTH EARS: ICD-10-CM

## 2022-06-14 PROCEDURE — 92567 TYMPANOMETRY: CPT | Performed by: AUDIOLOGIST

## 2022-06-14 PROCEDURE — 99213 OFFICE O/P EST LOW 20 MIN: CPT | Performed by: OTOLARYNGOLOGY

## 2022-06-14 PROCEDURE — 92557 COMPREHENSIVE HEARING TEST: CPT | Performed by: AUDIOLOGIST

## 2022-06-14 NOTE — PROGRESS NOTES
STANDARD AUDIOMETRIC EVALUATION      Name:  Kalli Lowe  :  1950  Age:  72 y.o.  Date of Evaluation:  2022      HISTORY    Reason for visit:  Kalli Lowe is seen today for a yearly hearing test at the request of Dr. Dimas Corcoran.  Patient has previously reported hearing loss In both ears, and sounds like crickets in both ears.  Today, she reports no changes in her hearing and no change in the cricket sounds in her ears.        EVALUATION    See Audiogram    RESULTS        Otoscopy and Tympanometry 226 Hz :  Right Ear:  Otoscopy:  Clear ear canal          Tympanometry:  Middle ear function within normal limits    Left Ear:   Otoscopy:  Clear ear canal        Tympanometry:  Middle ear function within normal limits    Test technique:  Standard Audiometry     Pure Tone Audiometry:   Patient responded to pure tones at 25-55 dB for 250-8000 Hz in right ear, and at 25-80 dB for 250-8000 Hz in left ear.       Speech Audiometry:        Right Ear:  Speech Reception Threshold (SRT) was obtained at 30 dBHL                 Speech Discrimination scores were 100% in quiet when words were presented at 70 dBHL       Left Ear:  Speech Reception Threshold (SRT) was obtained at 30 dBHL                 Speech Discrimination scores were 96% in quiet when words were presented at 70 dBHL    Reliability:   good    IMPRESSIONS:  1.  Tympanometry results are consistent with Middle ear function within normal limits in both ears.  2.  Pure tone results are consistent with mild to moderate sloping sensorineural hearing loss  for right ear, and mild to severe sloping sensorineural hearing loss  in left ear.       RECOMMENDATIONS:  Patient is seeing the Ear Nose and Throat physician immediately following this examination.  It was a pleasure seeing Kalli Lowe in Audiology today.  We would be happy to do further testing or discuss these test as necessary.          This document has  been electronically signed by July Madrid MS CCC-A on June 14, 2022 14:20 CDT       July Madrid MS CCC-A  Licensed Audiologist

## 2022-06-14 NOTE — PROGRESS NOTES
Subjective   Kalli Lowe is a 72 y.o. female.       History of Present Illness   Patient is followed with binaural sensorineural hearing loss and tinnitus.  Has not yet reached the point where she feels like she needs amplification.  Says she does not think her hearing has changed significantly since last year.  Still has binaural tinnitus.      The following portions of the patient's history were reviewed and updated as appropriate: allergies, current medications, past family history, past medical history, past social history, past surgical history and problem list.     reports that she has never smoked. She has never used smokeless tobacco. She reports that she does not drink alcohol and does not use drugs.   Patient is not a tobacco user and has not been counseled for use of tobacco products      Review of Systems        Objective   Physical Exam  Ears: External ears no deformity, canals no discharge, tympanic membranes intact clear and mobile bilaterally.  Nares: Septum to the left no discharge or purulence  Oral cavity: No masses or lesions  Pharynx: No erythema or exudate    Audiogram is obtained and reviewed and shows a bilateral essentially symmetrical sensorineural hearing loss that is mild to moderate with the exception of 8000 Hz on the left which is severe.  Tympanograms are type a bilaterally.  Discrimination is 100% on the right 96% on the left.  Compared to her hearing test from 1 year ago her hearing is actually slightly improved particularly in the low pitches.         Assessment and Plan   Diagnoses and all orders for this visit:    1. Sensorineural hearing loss of both ears (Primary)    2. Tinnitus of both ears               Plan: Discussed audiometric findings with patient.  She says she is not to the point where she feels like she wants to consider amplification.  Continue masking as needed for tinnitus and return in 1 year with another hearing test, call sooner for problems.

## 2022-07-05 ENCOUNTER — OFFICE VISIT (OUTPATIENT)
Dept: FAMILY MEDICINE CLINIC | Facility: CLINIC | Age: 72
End: 2022-07-05

## 2022-07-05 ENCOUNTER — LAB (OUTPATIENT)
Dept: LAB | Facility: HOSPITAL | Age: 72
End: 2022-07-05

## 2022-07-05 VITALS
SYSTOLIC BLOOD PRESSURE: 110 MMHG | DIASTOLIC BLOOD PRESSURE: 60 MMHG | OXYGEN SATURATION: 98 % | RESPIRATION RATE: 18 BRPM | HEART RATE: 65 BPM | BODY MASS INDEX: 20.29 KG/M2 | WEIGHT: 114.5 LBS | HEIGHT: 63 IN

## 2022-07-05 DIAGNOSIS — G20 PARKINSON DISEASE: ICD-10-CM

## 2022-07-05 DIAGNOSIS — E78.5 HYPERLIPIDEMIA, UNSPECIFIED HYPERLIPIDEMIA TYPE: ICD-10-CM

## 2022-07-05 DIAGNOSIS — Z00.00 MEDICARE ANNUAL WELLNESS VISIT, SUBSEQUENT: ICD-10-CM

## 2022-07-05 DIAGNOSIS — G20 PARKINSON DISEASE: Primary | ICD-10-CM

## 2022-07-05 DIAGNOSIS — Z00.00 MEDICARE ANNUAL WELLNESS VISIT, SUBSEQUENT: Primary | ICD-10-CM

## 2022-07-05 DIAGNOSIS — G20 PARKINSON DISEASE: Chronic | ICD-10-CM

## 2022-07-05 PROCEDURE — 80061 LIPID PANEL: CPT

## 2022-07-05 PROCEDURE — 80053 COMPREHEN METABOLIC PANEL: CPT

## 2022-07-05 PROCEDURE — 85025 COMPLETE CBC W/AUTO DIFF WBC: CPT

## 2022-07-05 PROCEDURE — 81001 URINALYSIS AUTO W/SCOPE: CPT

## 2022-07-05 PROCEDURE — 36415 COLL VENOUS BLD VENIPUNCTURE: CPT

## 2022-07-05 PROCEDURE — 1170F FXNL STATUS ASSESSED: CPT | Performed by: GENERAL PRACTICE

## 2022-07-05 PROCEDURE — G0439 PPPS, SUBSEQ VISIT: HCPCS | Performed by: GENERAL PRACTICE

## 2022-07-05 PROCEDURE — 1159F MED LIST DOCD IN RCRD: CPT | Performed by: GENERAL PRACTICE

## 2022-07-05 PROCEDURE — 1126F AMNT PAIN NOTED NONE PRSNT: CPT | Performed by: GENERAL PRACTICE

## 2022-07-05 RX ORDER — CITALOPRAM 10 MG/1
10 TABLET ORAL DAILY
Qty: 90 TABLET | Refills: 3 | Status: SHIPPED | OUTPATIENT
Start: 2022-07-05

## 2022-07-05 NOTE — PATIENT INSTRUCTIONS
Check on tetanus/pertussis vaccine at pharmacy or with insurance.      Medicare Wellness  Personal Prevention Plan of Service     Date of Office Visit:    Encounter Provider:  Tiffanie Hidalgo MD  Place of Service:  [facility]  Patient Name: Kalli Lowe  :  1950    As part of the Medicare Wellness portion of your visit today, we are providing you with this personalized preventive plan of services (PPPS). This plan is based upon recommendations of the [place] Preventive Services Task Force (USPSTF) and the Advisory Committee on Immunization Practices (ACIP).    This lists the preventive care services that should be considered, and provides dates of when you are due. Items listed as completed are up-to-date and do not require any further intervention.    Health Maintenance   Topic Date Due    TDAP/TD VACCINES (2 - Td or Tdap) 2019    COVID-19 Vaccine (3 - Booster for Moderna series) 2021    DXA SCAN  2021    LIPID PANEL  2022    ANNUAL WELLNESS VISIT  2022    INFLUENZA VACCINE  10/01/2022    MAMMOGRAM  2023    COLORECTAL CANCER SCREENING  2023    HEPATITIS C SCREENING  Completed    Pneumococcal Vaccine 65+  Completed    ZOSTER VACCINE  Addressed       No orders of the defined types were placed in this encounter.      Return in 13 months (on 2023) for Annual physical, medicare wellness visit.

## 2022-07-05 NOTE — PROGRESS NOTES
The ABCs of the Annual Wellness Visit  Subsequent Medicare Wellness Visit    Chief Complaint   Patient presents with   • Medicare Wellness-subsequent     Missed M and D appt.       Subjective    History of Present Illness:  Kalli Lowe is a 72 y.o. female who presents for a Subsequent Medicare Wellness Visit. Due for mammogram and bone density. Labs pending.     The following portions of the patient's history were reviewed and   updated as appropriate: allergies, current medications, past family history, past medical history, past social history, past surgical history and problem list.    Compared to one year ago, the patient feels her physical   health is the same.    Compared to one year ago, the patient feels her mental   health is the same.    Recent Hospitalizations:  She was not admitted to the hospital during the last year.       Current Medical Providers:  Patient Care Team:  Tiffanie Hidalgo MD as PCP -   Kim, Miguel LEBRON MD as Surgeon (General Surgery)  [place], Mckinley MICHAUD MD as Consulting Physician (Urology)    Outpatient Medications Prior to Visit   Medication Sig Dispense Refill   • Denosumab (PROLIA SC) Inject  under the skin into the appropriate area as directed Every 6 (Six) Months.     • doxycycline (VIBRAMYCIN) 100 MG capsule Take 1 capsule by mouth Daily.     • citalopram (CeleXA) 10 MG tablet Take 1 tablet by mouth Daily. 90 tablet 3   • carbidopa-levodopa (SINEMET)  MG per tablet Take 1 tablet by mouth 3 (Three) Times a Day. 1.5 tablets       No facility-administered medications prior to visit.       No opioid medication identified on active medication list. I have reviewed chart for other potential  high risk medication/s and harmful drug interactions in the elderly.          Aspirin is not on active medication list.  Aspirin use is not indicated based on review of current medical condition/s. Risk of harm outweighs potential benefits.  .    Patient Active Problem List  "  Diagnosis   • Osteoarthritis   • Osteopenia   • Screen for colon cancer   • Depressive disorder   • Parkinson disease (HCC)   • Age-related osteoporosis without current pathological fracture     Advance Care Planning  Advance Directive is on file.  ACP discussion was held with the patient during this visit. Patient has an advance directive in EMR which is still valid.           Objective    Vitals:    07/05/22 1454   BP: 110/60   Pulse: 65   Resp: 18   SpO2: 98%   Weight: 51.9 kg (114 lb 8 oz)   Height: 160 cm (63\")   PainSc: 0-No pain     Estimated body mass index is 20.28 kg/m² as calculated from the following:    Height as of this encounter: 160 cm (63\").    Weight as of this encounter: 51.9 kg (114 lb 8 oz).    BMI is within normal parameters. No other follow-up for BMI required.      Does the patient have evidence of cognitive impairment? No    Physical Exam  Vitals and nursing note reviewed.   Constitutional:       General: She is not in acute distress.     Appearance: She is well-developed.   HENT:      Head: Normocephalic and atraumatic.      Nose: Nose normal.   Eyes:      General:         Right eye: No discharge.         Left eye: No discharge.      Conjunctiva/sclera: Conjunctivae normal.      Pupils: Pupils are equal, round, and reactive to light.   Neck:      Thyroid: No thyromegaly.      Trachea: No tracheal deviation.   Cardiovascular:      Rate and Rhythm: Normal rate and regular rhythm.      Heart sounds: Normal heart sounds. No murmur heard.  Pulmonary:      Effort: Pulmonary effort is normal. No respiratory distress.      Breath sounds: Normal breath sounds. No wheezing or rales.   Chest:      Chest wall: No tenderness.   Breasts:      Right: No inverted nipple, mass, nipple discharge, skin change or tenderness.      Left: No inverted nipple, mass, nipple discharge, skin change or tenderness.       Abdominal:      General: Bowel sounds are normal. There is no distension.      Palpations: Abdomen " is soft. There is no mass.      Tenderness: There is no abdominal tenderness.      Hernia: No hernia is present.   Musculoskeletal:         General: No deformity. Normal range of motion.   Lymphadenopathy:      Cervical: No cervical adenopathy.   Skin:     General: Skin is warm and dry.   Neurological:      Mental Status: She is alert and oriented to person, place, and time.      Motor: Tremor (right hand) present.      Deep Tendon Reflexes: Reflexes are normal and symmetric.   Psychiatric:         Behavior: Behavior normal.         Thought Content: Thought content normal.         Judgment: Judgment normal.       HEALTH RISK ASSESSMENT    Smoking Status:  Social History     Tobacco Use   Smoking Status Never Smoker   Smokeless Tobacco Never Used     Alcohol Consumption:  Social History     Substance and Sexual Activity   Alcohol Use No     Fall Risk Screen:    STEADI Fall Risk Assessment was completed, and patient is at LOW risk for falls.Assessment completed on:7/5/2022    Depression Screening:  PHQ-2/PHQ-9 Depression Screening 7/5/2022   Retired PHQ-9 Total Score -   Retired Total Score -   Little Interest or Pleasure in Doing Things 0-->not at all   Feeling Down, Depressed or Hopeless 0-->not at all   Trouble Falling or Staying Asleep, or Sleeping Too Much 1-->several days   Feeling Tired or Having Little Energy 2-->more than half the days   Poor Appetite or Overeating 0-->not at all   Feeling Bad about Yourself - or that You are a Failure or Have Let Yourself or Your Family Down 0-->not at all   Trouble Concentrating on Things, Such as Reading the Newspaper or Watching Television 1-->several days   Moving or Speaking So Slowly that Other People Could Have Noticed? Or the Opposite - Being So Fidgety 1-->several days   Thoughts that You Would be Better Off Dead or of Hurting Yourself in Some Way 0-->not at all   PHQ-9: Brief Depression Severity Measure Score 5   If You Checked Off Any Problems, How Difficult Have  These Problems Made It For You to Do Your Work, Take Care of Things at Home, or Get Along with Other People? not difficult at all       Health Habits and Functional and Cognitive Screening:  Functional & Cognitive Status 7/5/2022   Do you have difficulty preparing food and eating? No   Do you have difficulty bathing yourself, getting dressed or grooming yourself? No   Do you have difficulty using the toilet? No   Do you have difficulty moving around from place to place? No   Do you have trouble with steps or getting out of a bed or a chair? Yes   Current Diet Well Balanced Diet   Dental Exam Up to date   Eye Exam Up to date   Exercise (times per week) 3 times per week   Current Exercises Include Home Exercise Program (TV, Computer, Etc.)   Current Exercise Activities Include -   Do you need help using the phone?  No   Are you deaf or do you have serious difficulty hearing?  No   Do you need help with transportation? No   Do you need help shopping? No   Do you need help preparing meals?  No   Do you need help with housework?  No   Do you need help with laundry? No   Do you need help taking your medications? No   Do you need help managing money? No   Do you ever drive or ride in a car without wearing a seat belt? No   Have you felt unusual stress, anger or loneliness in the last month? No   Who do you live with? Spouse   If you need help, do you have trouble finding someone available to you? No   Have you been bothered in the last four weeks by sexual problems? No   Do you have difficulty concentrating, remembering or making decisions? Yes       Age-appropriate Screening Schedule:  Refer to the list below for future screening recommendations based on patient's age, sex and/or medical conditions. Orders for these recommended tests are listed in the plan section. The patient has been provided with a written plan.    Health Maintenance   Topic Date Due   • TDAP/TD VACCINES (2 - Td or Tdap) 04/02/2019   • DXA SCAN   12/13/2021   • LIPID PANEL  06/22/2022   • INFLUENZA VACCINE  10/01/2022   • MAMMOGRAM  06/24/2023   • ZOSTER VACCINE  Addressed              Assessment & Plan   CMS Preventative Services Quick Reference  Risk Factors Identified During Encounter  Depression/Dysphoria  Immunizations Discussed/Encouraged (specific Immunizations; Tdap and COVID19  The above risks/problems have been discussed with the patient.  Follow up actions/plans if indicated are seen below in the Assessment/Plan Section.  Pertinent information has been shared with the patient in the After Visit Summary.    Diagnoses and all orders for this visit:    1. Medicare annual wellness visit, subsequent (Primary)    2. Parkinson disease (HCC)  -     citalopram (CeleXA) 10 MG tablet; Take 1 tablet by mouth Daily.  Dispense: 90 tablet; Refill: 3      Follow Up:   Return in 13 months (on 7/21/2023) for Annual physical, medicare wellness visit.     An After Visit Summary and PPPS were made available to the patient.  Information has been scanned into chart. Discussed importance of taking medications as prescribed. Encouraged healthy eating habits with low fat, low salt choices and working towards maintaining a healthy weight. Recommended regular exercise if able as well as care to prevent falls including avoiding anything on the floor that they could slip or trip on such as throw rugs, making sure they have a bathmat to step onto when their feet are wet and having grab bars and railings where needed.

## 2022-07-06 LAB
ALBUMIN SERPL-MCNC: 4.3 G/DL (ref 3.5–5.2)
ALBUMIN/GLOB SERPL: 1.9 G/DL
ALP SERPL-CCNC: 74 U/L (ref 39–117)
ALT SERPL W P-5'-P-CCNC: 11 U/L (ref 1–33)
ANION GAP SERPL CALCULATED.3IONS-SCNC: 13 MMOL/L (ref 5–15)
AST SERPL-CCNC: 24 U/L (ref 1–32)
BACTERIA UR QL AUTO: ABNORMAL /HPF
BASOPHILS # BLD AUTO: 0.04 10*3/MM3 (ref 0–0.2)
BASOPHILS NFR BLD AUTO: 0.8 % (ref 0–1.5)
BILIRUB SERPL-MCNC: 0.4 MG/DL (ref 0–1.2)
BILIRUB UR QL STRIP: NEGATIVE
BUN SERPL-MCNC: 20 MG/DL (ref 8–23)
BUN/CREAT SERPL: 24.4 (ref 7–25)
CALCIUM SPEC-SCNC: 9.2 MG/DL (ref 8.6–10.5)
CHLORIDE SERPL-SCNC: 101 MMOL/L (ref 98–107)
CHOLEST SERPL-MCNC: 173 MG/DL (ref 0–200)
CLARITY UR: CLEAR
CO2 SERPL-SCNC: 25 MMOL/L (ref 22–29)
COD CRY URNS QL: ABNORMAL /HPF
COLOR UR: YELLOW
CREAT SERPL-MCNC: 0.82 MG/DL (ref 0.57–1)
DEPRECATED RDW RBC AUTO: 43.8 FL (ref 37–54)
EGFRCR SERPLBLD CKD-EPI 2021: 75.2 ML/MIN/1.73
EOSINOPHIL # BLD AUTO: 0.12 10*3/MM3 (ref 0–0.4)
EOSINOPHIL NFR BLD AUTO: 2.3 % (ref 0.3–6.2)
ERYTHROCYTE [DISTWIDTH] IN BLOOD BY AUTOMATED COUNT: 13 % (ref 12.3–15.4)
GLOBULIN UR ELPH-MCNC: 2.3 GM/DL
GLUCOSE SERPL-MCNC: 105 MG/DL (ref 65–99)
GLUCOSE UR STRIP-MCNC: NEGATIVE MG/DL
HCT VFR BLD AUTO: 37.8 % (ref 34–46.6)
HDLC SERPL-MCNC: 72 MG/DL (ref 40–60)
HGB BLD-MCNC: 13.2 G/DL (ref 12–15.9)
HGB UR QL STRIP.AUTO: NEGATIVE
HYALINE CASTS UR QL AUTO: ABNORMAL /LPF
IMM GRANULOCYTES # BLD AUTO: 0.01 10*3/MM3 (ref 0–0.05)
IMM GRANULOCYTES NFR BLD AUTO: 0.2 % (ref 0–0.5)
KETONES UR QL STRIP: ABNORMAL
LDLC SERPL CALC-MCNC: 65 MG/DL (ref 0–100)
LDLC/HDLC SERPL: 0.78 {RATIO}
LEUKOCYTE ESTERASE UR QL STRIP.AUTO: ABNORMAL
LYMPHOCYTES # BLD AUTO: 2.03 10*3/MM3 (ref 0.7–3.1)
LYMPHOCYTES NFR BLD AUTO: 38.2 % (ref 19.6–45.3)
MCH RBC QN AUTO: 32.9 PG (ref 26.6–33)
MCHC RBC AUTO-ENTMCNC: 34.9 G/DL (ref 31.5–35.7)
MCV RBC AUTO: 94.3 FL (ref 79–97)
MONOCYTES # BLD AUTO: 0.4 10*3/MM3 (ref 0.1–0.9)
MONOCYTES NFR BLD AUTO: 7.5 % (ref 5–12)
NEUTROPHILS NFR BLD AUTO: 2.72 10*3/MM3 (ref 1.7–7)
NEUTROPHILS NFR BLD AUTO: 51 % (ref 42.7–76)
NITRITE UR QL STRIP: NEGATIVE
NRBC BLD AUTO-RTO: 0 /100 WBC (ref 0–0.2)
PH UR STRIP.AUTO: 6 [PH] (ref 5–8)
PLATELET # BLD AUTO: 180 10*3/MM3 (ref 140–450)
PMV BLD AUTO: 10.9 FL (ref 6–12)
POTASSIUM SERPL-SCNC: 4.5 MMOL/L (ref 3.5–5.2)
PROT SERPL-MCNC: 6.6 G/DL (ref 6–8.5)
PROT UR QL STRIP: ABNORMAL
RBC # BLD AUTO: 4.01 10*6/MM3 (ref 3.77–5.28)
RBC # UR STRIP: ABNORMAL /HPF
REF LAB TEST METHOD: ABNORMAL
SODIUM SERPL-SCNC: 139 MMOL/L (ref 136–145)
SP GR UR STRIP: 1.03 (ref 1–1.03)
SQUAMOUS #/AREA URNS HPF: ABNORMAL /HPF
TRIGL SERPL-MCNC: 223 MG/DL (ref 0–150)
UROBILINOGEN UR QL STRIP: ABNORMAL
VLDLC SERPL-MCNC: 36 MG/DL (ref 5–40)
WBC # UR STRIP: ABNORMAL /HPF
WBC NRBC COR # BLD: 5.32 10*3/MM3 (ref 3.4–10.8)

## 2022-07-22 ENCOUNTER — TELEPHONE (OUTPATIENT)
Dept: FAMILY MEDICINE CLINIC | Facility: CLINIC | Age: 72
End: 2022-07-22

## 2022-07-22 NOTE — TELEPHONE ENCOUNTER
Per Dr. Hidalgo, Ms. Lowe has been called with recent DEXA Bone Density Scan results & recommendations.  Continue current medications and follow-up as planned or sooner if any problems.   ----- Message from Tiffanie Hidalgo MD sent at 7/22/2022  9:19 AM CDT -----  Call and tell he is slowly improving, continue Prolia

## 2022-08-04 DIAGNOSIS — Z12.31 ENCOUNTER FOR SCREENING MAMMOGRAM FOR MALIGNANT NEOPLASM OF BREAST: Primary | ICD-10-CM

## 2022-08-22 ENCOUNTER — LAB (OUTPATIENT)
Dept: LAB | Facility: HOSPITAL | Age: 72
End: 2022-08-22

## 2022-08-22 DIAGNOSIS — M81.0 AGE RELATED OSTEOPOROSIS, UNSPECIFIED PATHOLOGICAL FRACTURE PRESENCE: ICD-10-CM

## 2022-08-22 DIAGNOSIS — M81.0 AGE RELATED OSTEOPOROSIS, UNSPECIFIED PATHOLOGICAL FRACTURE PRESENCE: Primary | ICD-10-CM

## 2022-08-22 PROCEDURE — 83735 ASSAY OF MAGNESIUM: CPT

## 2022-08-22 PROCEDURE — 36415 COLL VENOUS BLD VENIPUNCTURE: CPT

## 2022-08-22 PROCEDURE — 80053 COMPREHEN METABOLIC PANEL: CPT

## 2022-08-22 PROCEDURE — 84100 ASSAY OF PHOSPHORUS: CPT

## 2022-08-23 LAB
ALBUMIN SERPL-MCNC: 4.4 G/DL (ref 3.5–5.2)
ALBUMIN/GLOB SERPL: 2 G/DL
ALP SERPL-CCNC: 78 U/L (ref 39–117)
ALT SERPL W P-5'-P-CCNC: 15 U/L (ref 1–33)
ANION GAP SERPL CALCULATED.3IONS-SCNC: 14.5 MMOL/L (ref 5–15)
AST SERPL-CCNC: 26 U/L (ref 1–32)
BILIRUB SERPL-MCNC: 0.4 MG/DL (ref 0–1.2)
BUN SERPL-MCNC: 17 MG/DL (ref 8–23)
BUN/CREAT SERPL: 20.2 (ref 7–25)
CALCIUM SPEC-SCNC: 9.2 MG/DL (ref 8.6–10.5)
CHLORIDE SERPL-SCNC: 102 MMOL/L (ref 98–107)
CO2 SERPL-SCNC: 26.5 MMOL/L (ref 22–29)
CREAT SERPL-MCNC: 0.84 MG/DL (ref 0.57–1)
EGFRCR SERPLBLD CKD-EPI 2021: 73 ML/MIN/1.73
GLOBULIN UR ELPH-MCNC: 2.2 GM/DL
GLUCOSE SERPL-MCNC: 144 MG/DL (ref 65–99)
MAGNESIUM SERPL-MCNC: 2.1 MG/DL (ref 1.6–2.4)
PHOSPHATE SERPL-MCNC: 4.2 MG/DL (ref 2.5–4.5)
POTASSIUM SERPL-SCNC: 4.7 MMOL/L (ref 3.5–5.2)
PROT SERPL-MCNC: 6.6 G/DL (ref 6–8.5)
SODIUM SERPL-SCNC: 143 MMOL/L (ref 136–145)

## 2022-08-29 ENCOUNTER — INFUSION (OUTPATIENT)
Dept: ONCOLOGY | Facility: HOSPITAL | Age: 72
End: 2022-08-29

## 2022-08-29 VITALS — SYSTOLIC BLOOD PRESSURE: 116 MMHG | HEART RATE: 72 BPM | DIASTOLIC BLOOD PRESSURE: 65 MMHG | RESPIRATION RATE: 18 BRPM

## 2022-08-29 DIAGNOSIS — M19.90 OSTEOARTHRITIS, UNSPECIFIED OSTEOARTHRITIS TYPE, UNSPECIFIED SITE: Primary | ICD-10-CM

## 2022-08-29 DIAGNOSIS — M85.80 OSTEOPENIA, UNSPECIFIED LOCATION: ICD-10-CM

## 2022-08-29 PROCEDURE — 25010000002 DENOSUMAB 60 MG/ML SOLUTION PREFILLED SYRINGE: Performed by: GENERAL PRACTICE

## 2022-08-29 PROCEDURE — 96372 THER/PROPH/DIAG INJ SC/IM: CPT | Performed by: NURSE PRACTITIONER

## 2022-08-29 RX ADMIN — DENOSUMAB 60 MG: 60 INJECTION SUBCUTANEOUS at 15:04

## 2022-09-01 ENCOUNTER — TELEPHONE (OUTPATIENT)
Dept: FAMILY MEDICINE CLINIC | Facility: CLINIC | Age: 72
End: 2022-09-01

## 2022-09-01 NOTE — TELEPHONE ENCOUNTER
Ms Lowe called about her recent labs that were drawn for her Prolia Injection.     Her Glucose was elevated at 144 but she said she had eaten before having her labs drawn.  I did tell her that is probably why it was elevated and that I would send a message to you asking if she needs to make any changes or be concerned.     The she ask about her labs from July, I did tell her that you had looked at them and we sent a letter stating that her labs were stable.    She was asking about her Triglycerides and HDL being elevated I did tell her to follow a diet that is low in saturated fats and increase her exercise such as walking daily.     Please advise if any further information needs to be relayed.      KAREN Juarez

## 2022-09-03 DIAGNOSIS — U07.1 COVID-19: Primary | ICD-10-CM

## 2022-09-03 NOTE — PROGRESS NOTES
Patient called [facility] 9[facility] requesting prescription for Paxlovid. Chart, labs and meds reviewed. No contraindications. Patient acknowledged that she is requesting and experimental medication and accepts risk of such. Instructed patient to present to the ER should she have any adverse effects from medication or her covid symptoms fail to improve and become severe. Patient verbalized understanding of instruction.

## 2022-11-28 ENCOUNTER — OFFICE VISIT (OUTPATIENT)
Dept: FAMILY MEDICINE CLINIC | Facility: CLINIC | Age: 72
End: 2022-11-28

## 2022-11-28 VITALS
HEART RATE: 97 BPM | HEIGHT: 63 IN | OXYGEN SATURATION: 95 % | SYSTOLIC BLOOD PRESSURE: 108 MMHG | DIASTOLIC BLOOD PRESSURE: 80 MMHG | BODY MASS INDEX: 19.49 KG/M2 | WEIGHT: 110 LBS

## 2022-11-28 DIAGNOSIS — M84.375A STRESS FRACTURE, LEFT FOOT, INITIAL ENCOUNTER FOR FRACTURE: Primary | ICD-10-CM

## 2022-11-28 DIAGNOSIS — M79.672 LEFT FOOT PAIN: Primary | ICD-10-CM

## 2022-11-28 PROCEDURE — 99213 OFFICE O/P EST LOW 20 MIN: CPT | Performed by: GENERAL PRACTICE

## 2022-11-28 NOTE — PROGRESS NOTES
"Subjective   Kalil Tricia Lowe is a 72 y.o. female.   Chief Complaint   Patient presents with   • left foot pain     X 2 weeks     Foot Injury   The incident occurred more than 1 week ago (no known injury). There was no injury mechanism. The pain is present in the left foot. The pain is at a severity of 2/10. The pain is mild. The pain has been intermittent since onset. Pertinent negatives include no inability to bear weight. The symptoms are aggravated by weight bearing. She has tried nothing for the symptoms.      The following portions of the patient's history were reviewed and updated as appropriate: allergies, current medications, past social history and problem list.    Outpatient Medications Prior to Visit   Medication Sig Dispense Refill   • carbidopa-levodopa (SINEMET)  MG per tablet Take 1 tablet by mouth 3 (Three) Times a Day. 1.5 tablets     • citalopram (CeleXA) 10 MG tablet Take 1 tablet by mouth Daily. 90 tablet 3   • Denosumab (PROLIA SC) Inject  under the skin into the appropriate area as directed Every 6 (Six) Months.     • doxycycline (VIBRAMYCIN) 100 MG capsule Take 1 capsule by mouth Daily.       No facility-administered medications prior to visit.       Review of Systems  I have reviewed 12 systems with patient. Findings were negative except what is noted below and/or in history of present illness.     Objective   Visit Vitals  /80   Pulse 97   Ht 160 cm (63\")   Wt 49.9 kg (110 lb)   LMP  (LMP Unknown)   SpO2 95%   BMI 19.49 kg/m²     Physical Exam  Vitals reviewed.   Constitutional:       Appearance: She is well-developed.   HENT:      Head: Normocephalic and atraumatic.   Eyes:      Conjunctiva/sclera: Conjunctivae normal.      Pupils: Pupils are equal, round, and reactive to light.   Cardiovascular:      Pulses:           Dorsalis pedis pulses are 2+ on the left side.   Pulmonary:      Effort: Pulmonary effort is normal.   Musculoskeletal:        Feet:    Neurological: "      Mental Status: She is alert and oriented to person, place, and time.       Notes brought forward are reviewed and updated if indicated.     Assessment & Plan   Problems Addressed this Visit    None  Visit Diagnoses     Left foot pain    -  Primary      Diagnoses       Codes Comments    Left foot pain    -  Primary ICD-10-CM: M79.672  ICD-9-CM: 729.5           Will notify regarding results. Ice prn.    No orders of the defined types were placed in this encounter.    No follow-ups on file.        This document has been electronically signed by Tiffanie Hidalgo MD on November 28, 2022 12:17 CST

## 2022-11-30 ENCOUNTER — OFFICE VISIT (OUTPATIENT)
Dept: PODIATRY | Facility: CLINIC | Age: 72
End: 2022-11-30

## 2022-11-30 VITALS — OXYGEN SATURATION: 99 % | HEART RATE: 80 BPM | HEIGHT: 63 IN | WEIGHT: 110 LBS | BODY MASS INDEX: 19.49 KG/M2

## 2022-11-30 DIAGNOSIS — M79.672 LEFT FOOT PAIN: Primary | ICD-10-CM

## 2022-11-30 DIAGNOSIS — S92.325A CLOSED NONDISPLACED FRACTURE OF SECOND METATARSAL BONE OF LEFT FOOT, INITIAL ENCOUNTER: ICD-10-CM

## 2022-11-30 PROCEDURE — 28470 CLTX METATARSAL FX WO MNP EA: CPT | Performed by: PODIATRIST

## 2022-11-30 PROCEDURE — 99203 OFFICE O/P NEW LOW 30 MIN: CPT | Performed by: PODIATRIST

## 2022-12-02 ENCOUNTER — TELEPHONE (OUTPATIENT)
Dept: FAMILY MEDICINE CLINIC | Facility: CLINIC | Age: 72
End: 2022-12-02

## 2022-12-02 NOTE — TELEPHONE ENCOUNTER
Ms. Lowe called requesting to speak with you about her left foot stress fracture.     She has some questions, she wants to know if this is related to her Osteoporosis?     She also wants to know does she need to take any other supplements?

## 2022-12-27 ENCOUNTER — OFFICE VISIT (OUTPATIENT)
Dept: PODIATRY | Facility: CLINIC | Age: 72
End: 2022-12-27

## 2022-12-27 VITALS
SYSTOLIC BLOOD PRESSURE: 112 MMHG | DIASTOLIC BLOOD PRESSURE: 75 MMHG | BODY MASS INDEX: 19.49 KG/M2 | HEART RATE: 102 BPM | WEIGHT: 110 LBS | OXYGEN SATURATION: 98 % | HEIGHT: 63 IN

## 2022-12-27 DIAGNOSIS — S92.325D CLOSED NONDISPLACED FRACTURE OF SECOND METATARSAL BONE OF LEFT FOOT WITH ROUTINE HEALING, SUBSEQUENT ENCOUNTER: Primary | ICD-10-CM

## 2022-12-27 PROCEDURE — 99024 POSTOP FOLLOW-UP VISIT: CPT | Performed by: PODIATRIST

## 2022-12-27 NOTE — PROGRESS NOTES
Kalli Lowe  1950  72 y.o. female      12/27/2022    Chief Complaint   Patient presents with   • Left Foot - Follow-up, Fracture       History of Present Illness    Kalli Lowe is a 72 y.o.female returns to the clinic toady with a left foot nondisplaced fracture of the second metatarsal. Patient does not recall an injury. Xray obtained today. Patient is wearing a short cam boot.  Patient denies pain.    Past Medical History:   Diagnosis Date   • Age-related osteoporosis without current pathological fracture 8/18/2021   • Benign paroxysmal positional vertigo    • Breast lump     left breast benign on u/s guided mammotone bx      • Contact dermatitis     olecranon area from chair material?     • Cough    • Encounter for long-term (current) use of medications    • GERD (gastroesophageal reflux disease)    • H/O screening mammography    • Hip pain    • History of Holter monitoring 11/19/2013    Overall rhythm sinus rhythm with average heart rate of 73 bpm, min 49 , max 111. Frequent isolated with frequent bigeminal cycles, total of 491 bigeminal cycles. No couplets of runs of ventricular tachycardia. Rare isolated PACs, total of 25.   • History of urinary disorder    • Large ovary    • Myalgia    • Osteoarthritis    • Osteopenia    • Otalgia of left ear     resolved spontaneously      • Palpitations    • Parkinsonism (HCC)    • Postmenopausal state    • Screening for malignant neoplasm of breast      left breast benign mammotone bx      • Screening for osteoporosis    • Vertigo     resolved         Past Surgical History:   Procedure Laterality Date   • BREAST BIOPSY  09/08/2014    Ultrasound guided left breast mammotome biopsy with 8 gauge needle and also left a clip.   • COLONOSCOPY  11/12/2013    Hemorrhoids found.   • COLONOSCOPY N/A 11/16/2018    Procedure: COLONOSCOPY;  Surgeon: Claudio Martell MD;  Location: [facility] ENDOSCOPY;  Service: General   • HIP SURGERY Right  "01/24/2017    Total hip-lateral approach   • INJECTION OF MEDICATION  08/31/2015    PNEUMOC VAC/ADMIN/RCVD 4040F (1)      • JOINT REPLACEMENT Right     HIP   • PAP SMEAR  08/31/2015    WNL, CARD SENT, DR. CARLSON'S OFFICE         Family History   Problem Relation Age of Onset   • Cancer Other         malignant neoplasm of gastrointestinal tract      • Colon cancer Other    • Other Other           Hematologic disorder   • Arthritis Father    • Cancer Mother    • Colon cancer Mother        Allergies   Allergen Reactions   • Contrast Dye Nausea And Vomiting       Social History     Socioeconomic History   • Marital status:    Tobacco Use   • Smoking status: Never   • Smokeless tobacco: Never   Vaping Use   • Vaping Use: Never used   Substance and Sexual Activity   • Alcohol use: No   • Drug use: No   • Sexual activity: Defer         Current Outpatient Medications   Medication Sig Dispense Refill   • citalopram (CeleXA) 10 MG tablet Take 1 tablet by mouth Daily. 90 tablet 3   • Denosumab (PROLIA SC) Inject  under the skin into the appropriate area as directed Every 6 (Six) Months.     • doxycycline (VIBRAMYCIN) 100 MG capsule Take 1 capsule by mouth Daily.     • carbidopa-levodopa (SINEMET)  MG per tablet Take 1 tablet by mouth 3 (Three) Times a Day. 1.5 tablets       No current facility-administered medications for this visit.       Review of Systems   Constitutional: Negative.    HENT: Negative.    Eyes: Negative.    Respiratory: Negative.    Cardiovascular: Negative.    Gastrointestinal: Negative.    Endocrine: Negative.    Genitourinary: Negative.    Musculoskeletal:        Foot pain   Skin: Negative.    Allergic/Immunologic: Negative.    Neurological: Negative.    Hematological: Negative.    Psychiatric/Behavioral: Negative.          OBJECTIVE    /75   Pulse 102   Ht 160 cm (63\")   Wt 49.9 kg (110 lb)   LMP  (LMP Unknown)   SpO2 98%   BMI 19.49 kg/m²     Physical Exam  Vitals reviewed. "   Constitutional:       General: She is not in acute distress.     Appearance: She is well-developed.   HENT:      Head: Normocephalic and atraumatic.      Nose: Nose normal.   Eyes:      Conjunctiva/sclera: Conjunctivae normal.      Pupils: Pupils are equal, round, and reactive to light.   Cardiovascular:      Pulses:           Dorsalis pedis pulses are 2+ on the left side.   Pulmonary:      Effort: Pulmonary effort is normal. No respiratory distress.      Breath sounds: No wheezing.   Musculoskeletal:        Feet:    Feet:      Left foot:      Skin integrity: Skin integrity normal.   Skin:     General: Skin is warm and dry.      Capillary Refill: Capillary refill takes less than 2 seconds.   Neurological:      Mental Status: She is alert and oriented to person, place, and time.   Psychiatric:         Behavior: Behavior normal.         Thought Content: Thought content normal.                Procedures        ASSESSMENT AND PLAN    Diagnoses and all orders for this visit:    1. Closed nondisplaced fracture of second metatarsal bone of left foot with routine healing, subsequent encounter (Primary)  -     XR Foot 3+ View Left        -Foot pain improving.  Progress activity as tolerated without restriction.  - All questions were answered   - RTC as needed            This document has been electronically signed by Zackery Carolina DPM on December 31, 2022 10:55 CST     12/31/2022  10:55 CST

## 2023-02-16 ENCOUNTER — OFFICE VISIT (OUTPATIENT)
Dept: PODIATRY | Facility: CLINIC | Age: 73
End: 2023-02-16
Payer: MEDICARE

## 2023-02-16 VITALS — HEIGHT: 63 IN | HEART RATE: 105 BPM | WEIGHT: 110 LBS | OXYGEN SATURATION: 97 % | BODY MASS INDEX: 19.49 KG/M2

## 2023-02-16 DIAGNOSIS — S92.325G CLOSED NONDISPLACED FRACTURE OF SECOND METATARSAL BONE OF LEFT FOOT WITH DELAYED HEALING, SUBSEQUENT ENCOUNTER: Primary | ICD-10-CM

## 2023-02-16 PROBLEM — S92.325A CLOSED NONDISPLACED FRACTURE OF SECOND METATARSAL BONE OF LEFT FOOT: Status: ACTIVE | Noted: 2023-02-16

## 2023-02-16 PROCEDURE — 99024 POSTOP FOLLOW-UP VISIT: CPT | Performed by: NURSE PRACTITIONER

## 2023-02-16 NOTE — PROGRESS NOTES
Kalli Lowe  1950  73 y.o. female      02/16/2023    Chief Complaint   Patient presents with   • Left Foot - Fracture       History of Present Illness    Kalli Lowe is a 73 y.o.female came back to clinic for a follow up appointment on left foot fracture. Patient states she is having pain and is out of cam boot, Xray obtained today.      Past Medical History:   Diagnosis Date   • Age-related osteoporosis without current pathological fracture 8/18/2021   • Benign paroxysmal positional vertigo    • Breast lump     left breast benign on u/s guided mammotone bx      • Contact dermatitis     olecranon area from chair material?     • Cough    • Encounter for long-term (current) use of medications    • GERD (gastroesophageal reflux disease)    • H/O screening mammography    • Hip pain    • History of Holter monitoring 11/19/2013    Overall rhythm sinus rhythm with average heart rate of 73 bpm, min 49 , max 111. Frequent isolated with frequent bigeminal cycles, total of 491 bigeminal cycles. No couplets of runs of ventricular tachycardia. Rare isolated PACs, total of 25.   • History of urinary disorder    • Large ovary    • Myalgia    • Osteoarthritis    • Osteopenia    • Otalgia of left ear     resolved spontaneously      • Palpitations    • Parkinsonism (HCC)    • Postmenopausal state    • Screening for malignant neoplasm of breast      left breast benign mammotone bx      • Screening for osteoporosis    • Vertigo     resolved         Past Surgical History:   Procedure Laterality Date   • BREAST BIOPSY  09/08/2014    Ultrasound guided left breast mammotome biopsy with 8 gauge needle and also left a clip.   • COLONOSCOPY  11/12/2013    Hemorrhoids found.   • COLONOSCOPY N/A 11/16/2018    Procedure: COLONOSCOPY;  Surgeon: Claudio Martell MD;  Location: [facility] ENDOSCOPY;  Service: General   • HIP SURGERY Right 01/24/2017    Total hip-lateral approach   • INJECTION OF MEDICATION   "08/31/2015    PNEUMOC VAC/ADMIN/RCVD 4040F (1)      • JOINT REPLACEMENT Right     HIP   • PAP SMEAR  08/31/2015    WNL, CARD SENT, DR. CARLSON'S OFFICE         Family History   Problem Relation Age of Onset   • Cancer Other         malignant neoplasm of gastrointestinal tract      • Colon cancer Other    • Other Other           Hematologic disorder   • Arthritis Father    • Cancer Mother    • Colon cancer Mother        Allergies   Allergen Reactions   • Contrast Dye (Echo Or Unknown Ct/Mr) Nausea And Vomiting       Social History     Socioeconomic History   • Marital status:    Tobacco Use   • Smoking status: Never   • Smokeless tobacco: Never   Vaping Use   • Vaping Use: Never used   Substance and Sexual Activity   • Alcohol use: No   • Drug use: No   • Sexual activity: Defer         Current Outpatient Medications   Medication Sig Dispense Refill   • citalopram (CeleXA) 10 MG tablet Take 1 tablet by mouth Daily. 90 tablet 3   • Denosumab (PROLIA SC) Inject  under the skin into the appropriate area as directed Every 6 (Six) Months.     • doxycycline (VIBRAMYCIN) 100 MG capsule Take 1 capsule by mouth Daily.     • carbidopa-levodopa (SINEMET)  MG per tablet Take 1 tablet by mouth 3 (Three) Times a Day. 1.5 tablets       No current facility-administered medications for this visit.       Review of Systems   Musculoskeletal:        Foot pain    All other systems reviewed and are negative.        OBJECTIVE    Pulse 105   Ht 160 cm (63\")   Wt 49.9 kg (110 lb)   LMP  (LMP Unknown)   SpO2 97%   BMI 19.49 kg/m²     Physical Exam  Vitals reviewed.   Constitutional:       Appearance: Normal appearance. She is well-developed.   HENT:      Head: Normocephalic and atraumatic.   Neck:      Trachea: Trachea and phonation normal.   Cardiovascular:      Pulses:           Dorsalis pedis pulses are 1+ on the left side.        Posterior tibial pulses are 1+ on the left side.   Pulmonary:      Effort: Pulmonary effort " is normal. No respiratory distress.   Abdominal:      General: There is no distension.      Palpations: Abdomen is soft.   Musculoskeletal:        Feet:    Skin:     General: Skin is warm and dry.   Neurological:      Mental Status: She is alert and oriented to person, place, and time.      GCS: GCS eye subscore is 4. GCS verbal subscore is 5. GCS motor subscore is 6.   Psychiatric:         Speech: Speech normal.         Behavior: Behavior normal. Behavior is cooperative.         Thought Content: Thought content normal.         Judgment: Judgment normal.                Procedures        ASSESSMENT AND PLAN    Diagnoses and all orders for this visit:    1. Closed nondisplaced fracture of second metatarsal bone of left foot with delayed healing, subsequent encounter (Primary)  -     XR Foot 3+ View Left        Patient's reporting no significant improvement in her discomfort since her last evaluation and there does not appear to be a significant amount of callus formation although a little since the last x-ray obtained in December 2022.  After long discussion patient revealed that she is currently osteoporotic and on Prolia twice yearly for treatment of osteoporosis along with vitamin D supplementation.  I encouraged her to continue the use of the Ortho boot/postoperative shoe which was given to her today in office, modified weightbearing with a walker and/or cane and follow-up in 1 month for repeat x-rays of the foot prior to the office visit.  She verbalized understanding of the plan of care will contact the office in the meantime for any worsening symptoms.          This document has been electronically signed by Raza RIVERA, FNP-C, ONP-C on February 16, 2023 16:08 CST

## 2023-02-22 ENCOUNTER — TELEPHONE (OUTPATIENT)
Dept: FAMILY MEDICINE CLINIC | Facility: CLINIC | Age: 73
End: 2023-02-22
Payer: MEDICARE

## 2023-02-22 NOTE — TELEPHONE ENCOUNTER
Tricia left a message saying she has an infusion on 3-1-23 and she needs someone to call her back regarding this please.  Her number is [phone]

## 2023-02-23 ENCOUNTER — TELEPHONE (OUTPATIENT)
Dept: FAMILY MEDICINE CLINIC | Facility: CLINIC | Age: 73
End: 2023-02-23
Payer: MEDICARE

## 2023-02-23 DIAGNOSIS — M81.0 AGE-RELATED OSTEOPOROSIS WITHOUT CURRENT PATHOLOGICAL FRACTURE: Primary | ICD-10-CM

## 2023-02-27 ENCOUNTER — LAB (OUTPATIENT)
Dept: LAB | Facility: HOSPITAL | Age: 73
End: 2023-02-27
Payer: MEDICARE

## 2023-02-27 DIAGNOSIS — M81.0 AGE-RELATED OSTEOPOROSIS WITHOUT CURRENT PATHOLOGICAL FRACTURE: ICD-10-CM

## 2023-02-27 PROCEDURE — 84100 ASSAY OF PHOSPHORUS: CPT

## 2023-02-27 PROCEDURE — 83735 ASSAY OF MAGNESIUM: CPT

## 2023-02-27 PROCEDURE — 36415 COLL VENOUS BLD VENIPUNCTURE: CPT

## 2023-02-27 PROCEDURE — 80053 COMPREHEN METABOLIC PANEL: CPT

## 2023-02-28 LAB
ALBUMIN SERPL-MCNC: 4.3 G/DL (ref 3.5–5.2)
ALBUMIN/GLOB SERPL: 1.6 G/DL
ALP SERPL-CCNC: 90 U/L (ref 39–117)
ALT SERPL W P-5'-P-CCNC: 10 U/L (ref 1–33)
ANION GAP SERPL CALCULATED.3IONS-SCNC: 9 MMOL/L (ref 5–15)
AST SERPL-CCNC: 30 U/L (ref 1–32)
BILIRUB SERPL-MCNC: 0.5 MG/DL (ref 0–1.2)
BUN SERPL-MCNC: 21 MG/DL (ref 8–23)
BUN/CREAT SERPL: 20.2 (ref 7–25)
CALCIUM SPEC-SCNC: 9.9 MG/DL (ref 8.6–10.5)
CHLORIDE SERPL-SCNC: 104 MMOL/L (ref 98–107)
CO2 SERPL-SCNC: 29 MMOL/L (ref 22–29)
CREAT SERPL-MCNC: 1.04 MG/DL (ref 0.57–1)
EGFRCR SERPLBLD CKD-EPI 2021: 56.2 ML/MIN/1.73
GLOBULIN UR ELPH-MCNC: 2.7 GM/DL
GLUCOSE SERPL-MCNC: 110 MG/DL (ref 65–99)
MAGNESIUM SERPL-MCNC: 2.3 MG/DL (ref 1.6–2.4)
PHOSPHATE SERPL-MCNC: 4.1 MG/DL (ref 2.5–4.5)
POTASSIUM SERPL-SCNC: 4.1 MMOL/L (ref 3.5–5.2)
PROT SERPL-MCNC: 7 G/DL (ref 6–8.5)
SODIUM SERPL-SCNC: 142 MMOL/L (ref 136–145)

## 2023-03-07 ENCOUNTER — INFUSION (OUTPATIENT)
Dept: ONCOLOGY | Facility: HOSPITAL | Age: 73
End: 2023-03-07
Payer: MEDICARE

## 2023-03-07 VITALS
RESPIRATION RATE: 18 BRPM | DIASTOLIC BLOOD PRESSURE: 56 MMHG | HEART RATE: 85 BPM | SYSTOLIC BLOOD PRESSURE: 122 MMHG | TEMPERATURE: 98.1 F

## 2023-03-07 DIAGNOSIS — M19.90 OSTEOARTHRITIS, UNSPECIFIED OSTEOARTHRITIS TYPE, UNSPECIFIED SITE: Primary | ICD-10-CM

## 2023-03-07 DIAGNOSIS — M85.80 OSTEOPENIA, UNSPECIFIED LOCATION: ICD-10-CM

## 2023-03-07 PROCEDURE — 25010000002 DENOSUMAB 60 MG/ML SOLUTION PREFILLED SYRINGE: Performed by: GENERAL PRACTICE

## 2023-03-07 PROCEDURE — 96372 THER/PROPH/DIAG INJ SC/IM: CPT | Performed by: NURSE PRACTITIONER

## 2023-03-07 RX ORDER — MULTIPLE VITAMINS W/ MINERALS TAB 9MG-400MCG
1 TAB ORAL DAILY
COMMUNITY

## 2023-03-07 RX ADMIN — DENOSUMAB 60 MG: 60 INJECTION SUBCUTANEOUS at 15:42

## 2023-03-16 ENCOUNTER — OFFICE VISIT (OUTPATIENT)
Dept: PODIATRY | Facility: CLINIC | Age: 73
End: 2023-03-16
Payer: MEDICARE

## 2023-03-16 VITALS
HEIGHT: 63 IN | HEART RATE: 94 BPM | DIASTOLIC BLOOD PRESSURE: 64 MMHG | WEIGHT: 110 LBS | BODY MASS INDEX: 19.49 KG/M2 | SYSTOLIC BLOOD PRESSURE: 105 MMHG | OXYGEN SATURATION: 95 %

## 2023-03-16 DIAGNOSIS — S92.325G CLOSED NONDISPLACED FRACTURE OF SECOND METATARSAL BONE OF LEFT FOOT WITH DELAYED HEALING, SUBSEQUENT ENCOUNTER: Primary | ICD-10-CM

## 2023-03-16 PROCEDURE — 99024 POSTOP FOLLOW-UP VISIT: CPT | Performed by: NURSE PRACTITIONER

## 2023-03-16 NOTE — PROGRESS NOTES
Kalli Lowe  1950  73 y.o. female      3/16/2023    Chief Complaint   Patient presents with   • Left Foot - Follow-up, Fracture       History of Present Illness    Kalli Loew is a 73 y.o.female came back to clinic for a follow up appointment on left foot fracture. Xray obtained today.     Past Medical History:   Diagnosis Date   • Age-related osteoporosis without current pathological fracture 8/18/2021   • Benign paroxysmal positional vertigo    • Breast lump     left breast benign on u/s guided mammotone bx      • Contact dermatitis     olecranon area from chair material?     • Cough    • Encounter for long-term (current) use of medications    • GERD (gastroesophageal reflux disease)    • H/O screening mammography    • Hip pain    • History of Holter monitoring 11/19/2013    Overall rhythm sinus rhythm with average heart rate of 73 bpm, min 49 , max 111. Frequent isolated with frequent bigeminal cycles, total of 491 bigeminal cycles. No couplets of runs of ventricular tachycardia. Rare isolated PACs, total of 25.   • History of urinary disorder    • Large ovary    • Myalgia    • Osteoarthritis    • Osteopenia    • Otalgia of left ear     resolved spontaneously      • Palpitations    • Parkinsonism (HCC)    • Postmenopausal state    • Screening for malignant neoplasm of breast      left breast benign mammotone bx      • Screening for osteoporosis    • Vertigo     resolved         Past Surgical History:   Procedure Laterality Date   • BREAST BIOPSY  09/08/2014    Ultrasound guided left breast mammotome biopsy with 8 gauge needle and also left a clip.   • COLONOSCOPY  11/12/2013    Hemorrhoids found.   • COLONOSCOPY N/A 11/16/2018    Procedure: COLONOSCOPY;  Surgeon: Claudio Martell MD;  Location: [facility] ENDOSCOPY;  Service: General   • HIP SURGERY Right 01/24/2017    Total hip-lateral approach   • INJECTION OF MEDICATION  08/31/2015    PNEUMOC VAC/ADMIN/RCVD 4040F (1)      •  "JOINT REPLACEMENT Right     HIP   • PAP SMEAR  08/31/2015    WNL, CARD SENT, DR. CARLSON'S OFFICE         Family History   Problem Relation Age of Onset   • Cancer Other         malignant neoplasm of gastrointestinal tract      • Colon cancer Other    • Other Other           Hematologic disorder   • Arthritis Father    • Cancer Mother    • Colon cancer Mother        Allergies   Allergen Reactions   • Contrast Dye (Echo Or Unknown Ct/Mr) Nausea And Vomiting       Social History     Socioeconomic History   • Marital status:    Tobacco Use   • Smoking status: Never   • Smokeless tobacco: Never   Vaping Use   • Vaping Use: Never used   Substance and Sexual Activity   • Alcohol use: No   • Drug use: No   • Sexual activity: Defer         Current Outpatient Medications   Medication Sig Dispense Refill   • Calcium Carbonate-Vitamin D (CALTRATE 600+D PO) Take 1 tablet by mouth 2 (Two) Times a Day.     • citalopram (CeleXA) 10 MG tablet Take 1 tablet by mouth Daily. 90 tablet 3   • Denosumab (PROLIA SC) Inject  under the skin into the appropriate area as directed Every 6 (Six) Months.     • doxycycline (VIBRAMYCIN) 100 MG capsule Take 1 capsule by mouth Daily.     • multivitamin with minerals tablet tablet Take 1 tablet by mouth Daily.     • carbidopa-levodopa (SINEMET)  MG per tablet Take 1 tablet by mouth 3 (Three) Times a Day. 1.5 tablets       No current facility-administered medications for this visit.       Review of Systems   Musculoskeletal:        Foot pain    All other systems reviewed and are negative.        OBJECTIVE    /64   Pulse 94   Ht 160 cm (63\")   Wt 49.9 kg (110 lb)   LMP  (LMP Unknown)   SpO2 95%   BMI 19.49 kg/m²     Physical Exam  Vitals reviewed.   Constitutional:       Appearance: Normal appearance. She is well-developed.   HENT:      Head: Normocephalic and atraumatic.   Neck:      Trachea: Trachea and phonation normal.   Cardiovascular:      Pulses:           Dorsalis pedis " pulses are 1+ on the left side.        Posterior tibial pulses are 1+ on the left side.   Pulmonary:      Effort: Pulmonary effort is normal. No respiratory distress.   Abdominal:      General: There is no distension.      Palpations: Abdomen is soft.   Musculoskeletal:        Feet:    Skin:     General: Skin is warm and dry.   Neurological:      Mental Status: She is alert and oriented to person, place, and time.      GCS: GCS eye subscore is 4. GCS verbal subscore is 5. GCS motor subscore is 6.   Psychiatric:         Speech: Speech normal.         Behavior: Behavior normal. Behavior is cooperative.         Thought Content: Thought content normal.         Judgment: Judgment normal.          AP lateral oblique view of the left foot reveals a stable fracture at the base of the second metatarsal with evidence of bony healing noted when compared to previous films.  No other acute radiological abnormality was noted.      Procedures        ASSESSMENT AND PLAN    Diagnoses and all orders for this visit:    1. Closed nondisplaced fracture of second metatarsal bone of left foot with delayed healing, subsequent encounter (Primary)  -     XR Foot 3+ View Left      Patient was fitted with a better fitting postoperative shoe for her to wear during ambulation and will recommend follow-up in 1 month with repeat x-rays prior to the office visit.  She verbalized understand the plan of care, left ambulatory without difficulty and will contact us for any worsening symptoms.          This document has been electronically signed by Raza RIVERA, FNP-NERIS, ONP-C on March 16, 2023 15:38 CDT

## 2023-04-03 ENCOUNTER — TELEPHONE (OUTPATIENT)
Dept: FAMILY MEDICINE CLINIC | Facility: CLINIC | Age: 73
End: 2023-04-03
Payer: MEDICARE

## 2023-04-03 NOTE — TELEPHONE ENCOUNTER
Pt called, stating she was diagnosed with stress fracture of L foot in 11/2022, and her foot isn't better. She is requesting a referral to Orthopaedics for this issue    Pt callback number [phone]

## 2023-04-04 DIAGNOSIS — M84.375A STRESS FRACTURE, LEFT FOOT, INITIAL ENCOUNTER FOR FRACTURE: Primary | ICD-10-CM

## 2023-04-19 ENCOUNTER — OFFICE VISIT (OUTPATIENT)
Dept: ORTHOPEDIC SURGERY | Facility: CLINIC | Age: 73
End: 2023-04-19
Payer: MEDICARE

## 2023-04-19 VITALS — HEIGHT: 63 IN | BODY MASS INDEX: 20.09 KG/M2 | WEIGHT: 113.4 LBS

## 2023-04-19 DIAGNOSIS — M79.672 CHRONIC PAIN IN LEFT FOOT: ICD-10-CM

## 2023-04-19 DIAGNOSIS — M85.80 OSTEOPENIA, UNSPECIFIED LOCATION: ICD-10-CM

## 2023-04-19 DIAGNOSIS — G89.29 CHRONIC PAIN IN LEFT FOOT: ICD-10-CM

## 2023-04-19 DIAGNOSIS — M84.374A STRESS FRACTURE OF METATARSAL BONE OF RIGHT FOOT, INITIAL ENCOUNTER: Primary | ICD-10-CM

## 2023-04-19 DIAGNOSIS — M79.672 ARCH PAIN OF LEFT FOOT: ICD-10-CM

## 2023-04-19 NOTE — PROGRESS NOTES
Kalli Lowe is a 73 y.o. female   Primary provider:  Tiffanie Hidalgo MD       Chief Complaint   Patient presents with   • Left Foot - Pain   • Establish Care       HISTORY OF PRESENT ILLNESS:  73-year-old female patient who presented with acute on chronic left foot pain possibly from stress fracture of the second metatarsal that is nondisplaced and nonhealing for the past few months.  Patient has been seen in the past by my podiatry team colleagues NORMA BanksPDAPHNE and NICOLE Rosales for the same.  Patient is here for an alternate opinion since she found no relief with the conservative measures of rest, activity modification, orthotic foot measures that were adopted in the past.    Pain  This is a new problem. The current episode started more than 1 month ago. The problem occurs intermittently. Associated symptoms comments: Aching, swelling. The symptoms are aggravated by walking. She has tried rest for the symptoms.        CONCURRENT MEDICAL HISTORY:  The following portions of the patient's history were reviewed and updated as appropriate: allergies, current medications, past family history, past medical history, past social history, past surgical history and problem list.     Past Medical History:   Diagnosis Date   • Age-related osteoporosis without current pathological fracture 8/18/2021   • Benign paroxysmal positional vertigo    • Breast lump     left breast benign on u/s guided mammotone bx      • Contact dermatitis     olecranon area from chair material?     • Cough    • Encounter for long-term (current) use of medications    • GERD (gastroesophageal reflux disease)    • H/O screening mammography    • Hip pain    • History of Holter monitoring 11/19/2013    Overall rhythm sinus rhythm with average heart rate of 73 bpm, min 49 , max 111. Frequent isolated with frequent bigeminal cycles, total of 491 bigeminal cycles. No couplets of runs of ventricular tachycardia. Rare  isolated PACs, total of 25.   • History of urinary disorder    • Large ovary    • Myalgia    • Osteoarthritis    • Osteopenia    • Otalgia of left ear     resolved spontaneously      • Palpitations    • Parkinsonism    • Postmenopausal state    • Screening for malignant neoplasm of breast      left breast benign mammotone bx      • Screening for osteoporosis    • Vertigo     resolved       Allergies   Allergen Reactions   • Contrast Dye (Echo Or Unknown Ct/Mr) Nausea And Vomiting         Current Outpatient Medications:   •  Calcium Carbonate-Vitamin D (CALTRATE 600+D PO), Take 1 tablet by mouth 2 (Two) Times a Day., Disp: , Rfl:   •  carbidopa-levodopa (SINEMET)  MG per tablet, Take 1 tablet by mouth 3 (Three) Times a Day. 1.5 tablets, Disp: , Rfl:   •  citalopram (CeleXA) 10 MG tablet, Take 1 tablet by mouth Daily., Disp: 90 tablet, Rfl: 3  •  Denosumab (PROLIA SC), Inject  under the skin into the appropriate area as directed Every 6 (Six) Months., Disp: , Rfl:   •  doxycycline (VIBRAMYCIN) 100 MG capsule, Take 1 capsule by mouth Daily., Disp: , Rfl:   •  multivitamin with minerals tablet tablet, Take 1 tablet by mouth Daily., Disp: , Rfl:     Past Surgical History:   Procedure Laterality Date   • BREAST BIOPSY  09/08/2014    Ultrasound guided left breast mammotome biopsy with 8 gauge needle and also left a clip.   • COLONOSCOPY  11/12/2013    Hemorrhoids found.   • COLONOSCOPY N/A 11/16/2018    Procedure: COLONOSCOPY;  Surgeon: Claudio Martell MD;  Location: [facility] ENDOSCOPY;  Service: General   • HIP SURGERY Right 01/24/2017    Total hip-lateral approach   • INJECTION OF MEDICATION  08/31/2015    PNEUMOC VAC/ADMIN/RCVD 4040F (1)      • JOINT REPLACEMENT Right     HIP   • PAP SMEAR  08/31/2015    WNL, CARD SENT, DR. CARLSON'S OFFICE       Family History   Problem Relation Age of Onset   • Cancer Other         malignant neoplasm of gastrointestinal tract      • Colon cancer Other    • Other Other            "Hematologic disorder   • Arthritis Father    • Cancer Mother    • Colon cancer Mother        Social History     Socioeconomic History   • Marital status:    Tobacco Use   • Smoking status: Never   • Smokeless tobacco: Never   Vaping Use   • Vaping Use: Never used   Substance and Sexual Activity   • Alcohol use: No   • Drug use: No   • Sexual activity: Defer        Review of Systems   Endocrine: Positive for polydipsia.   Neurological: Positive for dizziness.   Psychiatric/Behavioral: Positive for sleep disturbance.   All other systems reviewed and are negative.      PHYSICAL EXAMINATION:       Ht 160 cm (63\")   Wt 51.4 kg (113 lb 6.4 oz)   LMP  (LMP Unknown)   BMI 20.09 kg/m²     Physical Exam    GAIT:     []  Normal  [x]  Antalgic    Assistive device: [x]  None  []  Walker     []  Crutches  []  Cane     []  Wheelchair  []  Stretcher    Ortho Exam  Left foot exam:  Patient was noted to have tenderness over the base of the second metatarsal with minimal swelling and discomfort.  Patient was also found to have rigid arches that were not well supported by the foot wear.  Able to flex extend the foot and ankle and the toes left side without any restriction.  No sign of DVT/compartment soft nontender.  Brisk capillary flex is noted.        No results found.    PROCEDURE: XR FOOT 3+ VW LEFT     VIEWS: 3     INDICATION: Second metatarsal fracture     COMPARISON: None     FINDINGS:     - fracture: Transversely oriented second metatarsal base  fracture is again seen. There is slightly increased sclerosis at  the fracture margin. There appears to be minimally increased  callus formation at the fracture site.    - alignment: Unchanged    - misc: Degenerative change at the first metatarsophalangeal  joint.           IMPRESSION:  Minimal evidence of ongoing osseous healing at the  second metatarsal base fracture..           Electronically signed by:  Tiffanie Houston MD  3/21/2023 10:29 AM  CDT Workstation: " [id number]      ASSESSMENT:  73-year-old female patient who presented with acute on chronic left foot pain possibly from stress fracture of the second metatarsal that is nondisplaced and nonhealing for the past few months.  Patient has been seen in the past by my podiatry team colleagues Dr. Zackery Carolina D.P.M. and NICOLE Rosales for the same.  Patient is here for an alternate opinion since she found no relief with the conservative measures of rest, activity modification, orthotic foot measures that were adopted in the past.  Diagnoses and all orders for this visit:    Stress fracture of metatarsal bone of right foot, initial encounter        PLAN  I personally reviewed this patient's left foot x-rays from 11/28/2022, 12/27/2022, 02/16/2023, 03/16/2023 and agree with the reported findings of stress fracture that is not healing and involving the base of the second metatarsal in the left foot.  I explained to the patient that the possible reasons could be rigid arches with the lack of the arch support in the foot where, sometimes there are no reasons such as idiopathic, and also the possibility of bone remodeling medication that she is on from the bone health clinic.  It is not reported that stress fractures in the feet could occur from medication that were to help with the bone remodeling such as osteoporosis.  However, patient was suggested to revert back to the [facility] that she was following up with to discuss other treatment options.  Also, patient was suggested to consider ultrasound bone stimulator to help locally to promote healing of the stress fracture in the left foot second metatarsal and was suggested to wear arched supports for the foot where and avoid any flatfoot wear and avoid prolonged standing.  No further follow-up was arranged with us at this point unless new symptoms develop or complications after or patient would decide to revert back based on her symptoms as  needed.  The patient voiced understanding of the risks, benefits, and alternative forms of treatment that were discussed and the patient agreed to the treatment plan.  This discussion was held with the patient by  Favian Diez MD and all questions were answered.  EMR Dragon/Transciption Disclaimer: Some of this note may be an electronic transcription/translation of spoken language to printed text using the Dragon Dictation System.  Time spent of a minimum of 45 minutes including the face to face evaluation, reviewing of medical history and prior medial records, reviewing of diagnostic studies, documentation, patient education and coordination of care and surgical treatment decision.       Favian Diez MD

## 2023-05-16 PROBLEM — G89.29 CHRONIC PAIN IN LEFT FOOT: Status: ACTIVE | Noted: 2023-05-16

## 2023-05-16 PROBLEM — M79.672 CHRONIC PAIN IN LEFT FOOT: Status: ACTIVE | Noted: 2023-05-16

## 2023-05-16 PROBLEM — M79.672 ARCH PAIN OF LEFT FOOT: Status: ACTIVE | Noted: 2023-05-16

## 2023-06-01 ENCOUNTER — OFFICE VISIT (OUTPATIENT)
Dept: PODIATRY | Facility: CLINIC | Age: 73
End: 2023-06-01

## 2023-06-01 VITALS — WEIGHT: 113 LBS | BODY MASS INDEX: 20.02 KG/M2 | HEIGHT: 63 IN

## 2023-06-01 DIAGNOSIS — S92.325G CLOSED NONDISPLACED FRACTURE OF SECOND METATARSAL BONE OF LEFT FOOT WITH DELAYED HEALING, SUBSEQUENT ENCOUNTER: Primary | ICD-10-CM

## 2023-06-01 NOTE — PROGRESS NOTES
Kalli Lowe  1950  73 y.o. female      06/01/2023    Chief Complaint   Patient presents with   • Left Foot - Pain       History of Present Illness    Kalli Lowe is a 73 y.o.female came back to clinic for a follow up appointment on left foot fracture. Patient states nothing is helping her pain. She has tried custom insoles, [employer] shoes, short CAM boot, tall CAM boot. She also states the swelling will not go down. She has not been going barefoot, and has been staying off of it as much as possible.     Past Medical History:   Diagnosis Date   • Age-related osteoporosis without current pathological fracture 8/18/2021   • Benign paroxysmal positional vertigo    • Breast lump     left breast benign on u/s guided mammotone bx      • Contact dermatitis     olecranon area from chair material?     • Cough    • Encounter for long-term (current) use of medications    • GERD (gastroesophageal reflux disease)    • H/O screening mammography    • Hip pain    • History of Holter monitoring 11/19/2013    Overall rhythm sinus rhythm with average heart rate of 73 bpm, min 49 , max 111. Frequent isolated with frequent bigeminal cycles, total of 491 bigeminal cycles. No couplets of runs of ventricular tachycardia. Rare isolated PACs, total of 25.   • History of urinary disorder    • Large ovary    • Myalgia    • Osteoarthritis    • Osteopenia    • Otalgia of left ear     resolved spontaneously      • Palpitations    • Parkinsonism    • Postmenopausal state    • Screening for malignant neoplasm of breast      left breast benign mammotone bx      • Screening for osteoporosis    • Vertigo     resolved         Past Surgical History:   Procedure Laterality Date   • BREAST BIOPSY  09/08/2014    Ultrasound guided left breast mammotome biopsy with 8 gauge needle and also left a clip.   • COLONOSCOPY  11/12/2013    Hemorrhoids found.   • COLONOSCOPY N/A 11/16/2018    Procedure: COLONOSCOPY;  Surgeon:  "Claudio Martell MD;  Location: [facility] ENDOSCOPY;  Service: General   • HIP SURGERY Right 01/24/2017    Total hip-lateral approach   • INJECTION OF MEDICATION  08/31/2015    PNEUMOC VAC/ADMIN/RCVD 4040F (1)      • JOINT REPLACEMENT Right     HIP   • PAP SMEAR  08/31/2015    WNL, CARD SENT, DR. CARLSON'S OFFICE         Family History   Problem Relation Age of Onset   • Cancer Other         malignant neoplasm of gastrointestinal tract      • Colon cancer Other    • Other Other           Hematologic disorder   • Arthritis Father    • Cancer Mother    • Colon cancer Mother        Allergies   Allergen Reactions   • Contrast Dye (Echo Or Unknown Ct/Mr) Nausea And Vomiting       Social History     Socioeconomic History   • Marital status:    Tobacco Use   • Smoking status: Never   • Smokeless tobacco: Never   Vaping Use   • Vaping Use: Never used   Substance and Sexual Activity   • Alcohol use: No   • Drug use: No   • Sexual activity: Defer         Current Outpatient Medications   Medication Sig Dispense Refill   • Calcium Carbonate-Vitamin D (CALTRATE 600+D PO) Take 1 tablet by mouth 2 (Two) Times a Day.     • carbidopa-levodopa (SINEMET)  MG per tablet Take 1 tablet by mouth 3 (Three) Times a Day. 1.5 tablets     • citalopram (CeleXA) 10 MG tablet Take 1 tablet by mouth Daily. 90 tablet 3   • Denosumab (PROLIA SC) Inject  under the skin into the appropriate area as directed Every 6 (Six) Months.     • doxycycline (VIBRAMYCIN) 100 MG capsule Take 1 capsule by mouth Daily.     • multivitamin with minerals tablet tablet Take 1 tablet by mouth Daily.       No current facility-administered medications for this visit.       Review of Systems   Musculoskeletal:        Foot pain    All other systems reviewed and are negative.        OBJECTIVE    Ht 160 cm (63\")   Wt 51.3 kg (113 lb)   LMP  (LMP Unknown)   BMI 20.02 kg/m²     Physical Exam  Vitals reviewed.   Constitutional:       Appearance: Normal appearance. " She is well-developed.   HENT:      Head: Normocephalic and atraumatic.   Neck:      Trachea: Trachea and phonation normal.   Cardiovascular:      Pulses:           Dorsalis pedis pulses are 1+ on the left side.        Posterior tibial pulses are 1+ on the left side.   Pulmonary:      Effort: Pulmonary effort is normal. No respiratory distress.   Abdominal:      General: There is no distension.      Palpations: Abdomen is soft.   Musculoskeletal:        Feet:    Skin:     General: Skin is warm and dry.   Neurological:      Mental Status: She is alert and oriented to person, place, and time.      GCS: GCS eye subscore is 4. GCS verbal subscore is 5. GCS motor subscore is 6.   Psychiatric:         Speech: Speech normal.         Behavior: Behavior normal. Behavior is cooperative.         Thought Content: Thought content normal.         Judgment: Judgment normal.          AP lateral oblique view of the left foot reveals a stable fracture at the base of the second metatarsal with evidence of bony healing noted when compared to previous films.  No other acute radiological abnormality was noted.      Procedures          ASSESSMENT AND PLAN    Diagnoses and all orders for this visit:    1. Closed nondisplaced fracture of second metatarsal bone of left foot with delayed healing, subsequent encounter (Primary)  -     XR Foot 3+ View Left  -     CT FOOT LEFT WITHOUT CONTRAST; Future      No significant bony healing noted over the base of the second metatarsal.  After long discussion with the patient we will proceed with a CT scan and consider a bone stimulator in the future.  The patient verbalized understanding plan of care left ambulatory in a regular shoe.  She was instructed to contact the office for worsening symptoms.          This document has been electronically signed by Raza RIVERA, FNPNADIA, ONP-C on June 1, 2023 15:14 CDT

## 2023-06-15 ENCOUNTER — HOSPITAL ENCOUNTER (OUTPATIENT)
Dept: CT IMAGING | Facility: HOSPITAL | Age: 73
Discharge: HOME | End: 2023-06-15
Admitting: NURSE PRACTITIONER
Payer: MEDICARE

## 2023-06-15 ENCOUNTER — CLINICAL SUPPORT (OUTPATIENT)
Dept: AUDIOLOGY | Facility: CLINIC | Age: 73
End: 2023-06-15
Payer: MEDICARE

## 2023-06-15 ENCOUNTER — OFFICE VISIT (OUTPATIENT)
Dept: OTOLARYNGOLOGY | Facility: CLINIC | Age: 73
End: 2023-06-15
Payer: MEDICARE

## 2023-06-15 VITALS — WEIGHT: 113 LBS | OXYGEN SATURATION: 99 % | HEART RATE: 70 BPM | BODY MASS INDEX: 20.02 KG/M2 | HEIGHT: 63 IN

## 2023-06-15 DIAGNOSIS — H90.3 SENSORINEURAL HEARING LOSS OF BOTH EARS: Primary | ICD-10-CM

## 2023-06-15 DIAGNOSIS — S92.325G CLOSED NONDISPLACED FRACTURE OF SECOND METATARSAL BONE OF LEFT FOOT WITH DELAYED HEALING, SUBSEQUENT ENCOUNTER: ICD-10-CM

## 2023-06-15 DIAGNOSIS — H90.3 SENSORINEURAL HEARING LOSS (SNHL) OF BOTH EARS: Primary | ICD-10-CM

## 2023-06-15 DIAGNOSIS — H93.13 TINNITUS OF BOTH EARS: ICD-10-CM

## 2023-06-15 PROCEDURE — 1159F MED LIST DOCD IN RCRD: CPT | Performed by: OTOLARYNGOLOGY

## 2023-06-15 PROCEDURE — 1160F RVW MEDS BY RX/DR IN RCRD: CPT | Performed by: OTOLARYNGOLOGY

## 2023-06-15 PROCEDURE — 99213 OFFICE O/P EST LOW 20 MIN: CPT | Performed by: OTOLARYNGOLOGY

## 2023-06-15 PROCEDURE — 92567 TYMPANOMETRY: CPT | Performed by: AUDIOLOGIST

## 2023-06-15 PROCEDURE — 73700 CT LOWER EXTREMITY W/O DYE: CPT

## 2023-06-15 PROCEDURE — 92557 COMPREHENSIVE HEARING TEST: CPT | Performed by: AUDIOLOGIST

## 2023-06-15 NOTE — PROGRESS NOTES
Subjective   Kalli Lowe is a 73 y.o. female.       History of Present Illness     Patient that I follow with bilateral sensorineural hearing loss.  Also has binaural tinnitus.  Returns today stating that she thinks her hearing is worse.  She is having more trouble understanding people especially in [place] and her  is telling her she is not understanding as well.    The following portions of the patient's history were reviewed and updated as appropriate: allergies, current medications, past family history, past medical history, past social history, past surgical history and problem list.     reports that she has never smoked. She has never used smokeless tobacco. She reports that she does not drink alcohol and does not use drugs.   Patient is not a tobacco user and has not been counseled for use of tobacco products      Review of Systems        Objective   Physical Exam  Ears: External ears no deformity.  Right ear canal shows some uninfected squamous debris that is cleaned under the microscope using instrumentation.  Beyond this tympanic membrane is intact no infection or effusion.  Left ear no discharge.  Tympanic membrane intact and clear.    Audiogram is obtained and reviewed and shows bilateral sensorineural hearing loss that is slightly worse on the left than the right starting at 1000 Hz.  Tympanograms are type a bilaterally.  Discrimination is 100% bilaterally.  Compared to her previous test from 1 year ago she has had a modest decrease in thresholds particularly from 250Hz through 3000 Hz.         Assessment and Plan   Diagnoses and all orders for this visit:    1. Sensorineural hearing loss of both ears (Primary)    2. Tinnitus of both ears           Plan: Told the patient she was medically cleared for binaural amplification and she is going to pursue this.  She should see me again in a year or as needed for further problems.

## 2023-06-19 NOTE — PROGRESS NOTES
STANDARD AUDIOMETRIC EVALUATION      Name:  Kalli Lowe  :  1950  Age:  73 y.o.  Date of Evaluation:  2023      HISTORY    Reason for visit:  Kalli Lowe is seen today for a yearly hearing test at the request of Dr. Dimas Corcoran.  Patient reports a history of bilateral sensorineural hearing loss and bilateral cricket sounds in her ears.  Today, she reports a possible decrease in her hearing.  She states she can hear people talking, but she can't understand the words.  She reports no change in the cricket sounds she's been hearing.  She reports no hearing aids at this time.       EVALUATION    See Audiogram    RESULTS        Otoscopy and Tympanometry 226 Hz :  Right Ear:  Otoscopy:  Clear ear canal          Tympanometry:  Middle ear function within normal limits    Left Ear:   Otoscopy:  Clear ear canal        Tympanometry:  Middle ear function within normal limits    Test technique:  Standard Audiometry     Pure Tone Audiometry:   Patient responded to pure tones at 30-65 dB for 250-8000 Hz in right ear, and at 30-85 dB for 250-8000 Hz in left ear.       Speech Audiometry:        Right Ear:  Speech Reception Threshold (SRT) was obtained at 30 dBHL                 Speech Discrimination scores were 100% in quiet when words were presented at 70 dBHL       Left Ear:  Speech Reception Threshold (SRT) was obtained at 35 dBHL                 Speech Discrimination scores were 100% in quiet when words were presented at 75 dBHL    Reliability:   good    IMPRESSIONS:  1.  Tympanometry results are consistent with Middle ear function within normal limits in both ears.  2.  Pure tone results are consistent with mild to moderately severe sloping sensorineural hearing loss  for right ear, and mild to severe sloping sensorineural hearing loss  in left ear.       RECOMMENDATIONS:  Patient is seeing the Ear Nose and Throat physician immediately following this examination.  It was a  pleasure seeing Kalli Lwoe in Audiology today.  We would be happy to do further testing or discuss these test as necessary.          This document has been electronically signed by July Madrid MS CCC-A on June 19, 2023 17:05 CDT       July Madrid MS CCC-A  Licensed Audiologist

## 2023-06-21 NOTE — TELEPHONE ENCOUNTER
"Wound Debridement    Date/Time: 6/21/2023 4:16 PM  Performed by: Samuel Toussaint DPM  Authorized by: Samuel Toussaint DPM     Time out: Immediately prior to procedure a "time out" was called to verify the correct patient, procedure, equipment, support staff and site/side marked as required.    Consent Done?:  Yes (Verbal)  Local anesthesia used?: No      Wound Details:    Location:  Left foot    Location:  Left Plantar    Type of Debridement:  Excisional       Length (cm):  3       Area (sq cm):  6       Width (cm):  2       Percent Debrided (%):  100       Depth (cm):  0.5       Total Area Debrided (sq cm):  6    Depth of debridement:  Subcutaneous tissue    Tissue debrided:  Dermis, Epidermis and Subcutaneous    Devitalized tissue debrided:  Biofilm, Callus, Necrotic/Eschar and Slough    Instruments:  Nippers and Curette  Bleeding:  Minimal  Hemostasis Achieved: Yes  Method Used:  Pressure    Additional wounds:  1    2nd Wound Details:     Location:  Right foot    Location:  Right Plantar    Location:  Right Plantar    Type of Debridement:  Excisional       Length (cm):  2       Area (sq cm):  2       Width (cm):  1       Percent Debrided (%):  100       Depth (cm):  0.4       Total Area Debrided (sq cm):  2    Depth of debridement:  Subcutaneous tissue    Tissue debrided:  Dermis, Epidermis and Subcutaneous    Devitalized tissue debrided:  Biofilm, Callus, Necrotic/Eschar and Slough    Instruments:  Curette and Nippers  Bleeding:  Minimal  Hemostasis Achieved: Yes    Method Used:  Pressure  Patient tolerance:  Patient tolerated the procedure well with no immediate complications  " error

## 2023-07-22 DIAGNOSIS — G20 PARKINSON DISEASE: Chronic | ICD-10-CM

## 2023-07-24 RX ORDER — CITALOPRAM HYDROBROMIDE 10 MG/1
10 TABLET ORAL DAILY
Qty: 90 TABLET | Refills: 2 | Status: SHIPPED | OUTPATIENT
Start: 2023-07-24

## 2023-07-25 ENCOUNTER — TELEPHONE (OUTPATIENT)
Dept: FAMILY MEDICINE CLINIC | Facility: CLINIC | Age: 73
End: 2023-07-25
Payer: MEDICARE

## 2023-07-25 DIAGNOSIS — E78.5 HYPERLIPIDEMIA, UNSPECIFIED HYPERLIPIDEMIA TYPE: ICD-10-CM

## 2023-07-25 DIAGNOSIS — G20 PARKINSON DISEASE: Primary | ICD-10-CM

## 2023-07-25 NOTE — TELEPHONE ENCOUNTER
Patient called regarding lab orders as she said she needs them for upcoming appt   Please call and she will go back to get done   Can be reached at [phone]

## 2023-07-26 ENCOUNTER — LAB (OUTPATIENT)
Dept: LAB | Facility: HOSPITAL | Age: 73
End: 2023-07-26
Payer: MEDICARE

## 2023-07-26 DIAGNOSIS — E78.5 HYPERLIPIDEMIA, UNSPECIFIED HYPERLIPIDEMIA TYPE: ICD-10-CM

## 2023-07-26 DIAGNOSIS — G20 PARKINSON DISEASE: ICD-10-CM

## 2023-07-26 LAB
ALBUMIN SERPL-MCNC: 4.7 G/DL (ref 3.5–5.2)
ALBUMIN/GLOB SERPL: 2.4 G/DL
ALP SERPL-CCNC: 78 U/L (ref 39–117)
ALT SERPL W P-5'-P-CCNC: 25 U/L (ref 1–33)
ANION GAP SERPL CALCULATED.3IONS-SCNC: 10 MMOL/L (ref 5–15)
AST SERPL-CCNC: 29 U/L (ref 1–32)
BASOPHILS # BLD AUTO: 0.03 10*3/MM3 (ref 0–0.2)
BASOPHILS NFR BLD AUTO: 0.9 % (ref 0–1.5)
BILIRUB SERPL-MCNC: 0.6 MG/DL (ref 0–1.2)
BUN SERPL-MCNC: 18 MG/DL (ref 8–23)
BUN/CREAT SERPL: 20.5 (ref 7–25)
CALCIUM SPEC-SCNC: 9.1 MG/DL (ref 8.6–10.5)
CHLORIDE SERPL-SCNC: 105 MMOL/L (ref 98–107)
CHOLEST SERPL-MCNC: 177 MG/DL (ref 0–200)
CO2 SERPL-SCNC: 25 MMOL/L (ref 22–29)
CREAT SERPL-MCNC: 0.88 MG/DL (ref 0.57–1)
DEPRECATED RDW RBC AUTO: 43.5 FL (ref 37–54)
EGFRCR SERPLBLD CKD-EPI 2021: 68.6 ML/MIN/1.73
EOSINOPHIL # BLD AUTO: 0.11 10*3/MM3 (ref 0–0.4)
EOSINOPHIL NFR BLD AUTO: 3.2 % (ref 0.3–6.2)
ERYTHROCYTE [DISTWIDTH] IN BLOOD BY AUTOMATED COUNT: 12.7 % (ref 12.3–15.4)
GLOBULIN UR ELPH-MCNC: 2 GM/DL
GLUCOSE SERPL-MCNC: 87 MG/DL (ref 65–99)
HCT VFR BLD AUTO: 37.8 % (ref 34–46.6)
HDLC SERPL-MCNC: 74 MG/DL (ref 40–60)
HGB BLD-MCNC: 13 G/DL (ref 12–15.9)
IMM GRANULOCYTES # BLD AUTO: 0.01 10*3/MM3 (ref 0–0.05)
IMM GRANULOCYTES NFR BLD AUTO: 0.3 % (ref 0–0.5)
LDLC SERPL CALC-MCNC: 84 MG/DL (ref 0–100)
LDLC/HDLC SERPL: 1.09 {RATIO}
LYMPHOCYTES # BLD AUTO: 1.48 10*3/MM3 (ref 0.7–3.1)
LYMPHOCYTES NFR BLD AUTO: 43 % (ref 19.6–45.3)
MCH RBC QN AUTO: 32.7 PG (ref 26.6–33)
MCHC RBC AUTO-ENTMCNC: 34.4 G/DL (ref 31.5–35.7)
MCV RBC AUTO: 95.2 FL (ref 79–97)
MONOCYTES # BLD AUTO: 0.34 10*3/MM3 (ref 0.1–0.9)
MONOCYTES NFR BLD AUTO: 9.9 % (ref 5–12)
NEUTROPHILS NFR BLD AUTO: 1.47 10*3/MM3 (ref 1.7–7)
NEUTROPHILS NFR BLD AUTO: 42.7 % (ref 42.7–76)
NRBC BLD AUTO-RTO: 0 /100 WBC (ref 0–0.2)
PLATELET # BLD AUTO: 159 10*3/MM3 (ref 140–450)
PMV BLD AUTO: 10.9 FL (ref 6–12)
POTASSIUM SERPL-SCNC: 4.3 MMOL/L (ref 3.5–5.2)
PROT SERPL-MCNC: 6.7 G/DL (ref 6–8.5)
RBC # BLD AUTO: 3.97 10*6/MM3 (ref 3.77–5.28)
SODIUM SERPL-SCNC: 140 MMOL/L (ref 136–145)
TRIGL SERPL-MCNC: 110 MG/DL (ref 0–150)
VLDLC SERPL-MCNC: 19 MG/DL (ref 5–40)
WBC NRBC COR # BLD: 3.44 10*3/MM3 (ref 3.4–10.8)

## 2023-07-26 PROCEDURE — 85025 COMPLETE CBC W/AUTO DIFF WBC: CPT

## 2023-07-26 PROCEDURE — 80053 COMPREHEN METABOLIC PANEL: CPT

## 2023-07-26 PROCEDURE — 80061 LIPID PANEL: CPT

## 2023-07-26 PROCEDURE — 36415 COLL VENOUS BLD VENIPUNCTURE: CPT

## 2023-08-01 ENCOUNTER — OFFICE VISIT (OUTPATIENT)
Dept: FAMILY MEDICINE CLINIC | Facility: CLINIC | Age: 73
End: 2023-08-01
Payer: MEDICARE

## 2023-08-01 VITALS
DIASTOLIC BLOOD PRESSURE: 70 MMHG | HEIGHT: 63 IN | OXYGEN SATURATION: 97 % | SYSTOLIC BLOOD PRESSURE: 100 MMHG | WEIGHT: 114 LBS | BODY MASS INDEX: 20.2 KG/M2 | HEART RATE: 63 BPM

## 2023-08-01 DIAGNOSIS — Z12.11 SCREENING FOR COLON CANCER: ICD-10-CM

## 2023-08-01 DIAGNOSIS — Z00.00 MEDICARE ANNUAL WELLNESS VISIT, SUBSEQUENT: Primary | ICD-10-CM

## 2023-08-01 DIAGNOSIS — Z80.0 FH: COLON CANCER: ICD-10-CM

## 2023-08-01 PROCEDURE — 1160F RVW MEDS BY RX/DR IN RCRD: CPT | Performed by: GENERAL PRACTICE

## 2023-08-01 PROCEDURE — G0439 PPPS, SUBSEQ VISIT: HCPCS | Performed by: GENERAL PRACTICE

## 2023-08-01 PROCEDURE — 1159F MED LIST DOCD IN RCRD: CPT | Performed by: GENERAL PRACTICE

## 2023-08-01 RX ORDER — MELATONIN
1000 DAILY
COMMUNITY

## 2023-08-01 NOTE — PROGRESS NOTES
The ABCs of the Annual Wellness Visit  Subsequent Medicare Wellness Visit    Chief Complaint   Patient presents with    Annual Exam     mamo Medicare Wellness-subsequent      Subjective    History of Present Illness:  Kalli Lowe is a 73 y.o. female who presents for a Subsequent Medicare Wellness Visit. Due for mammogram.  Due for colon cancer screening in November.    The following portions of the patient's history were reviewed and   updated as appropriate: allergies, current medications, past family history, past medical history, past social history, past surgical history, and problem list.    Compared to one year ago, the patient feels her physical   health is worse.    Compared to one year ago, the patient feels her mental   health is the same.    Recent Hospitalizations:  She was not admitted to the hospital during the last year.       Current Medical Providers:  Patient Care Team:  Tiffanie Hidalgo MD as PCP - General Kim, Miguel LEBRON MD as Surgeon (General Surgery)  [place], Mckinley MICHAUD MD as Consulting Physician (Urology)    Outpatient Medications Prior to Visit   Medication Sig Dispense Refill    Calcium Carbonate-Vitamin D (CALTRATE 600+D PO) Take 1 tablet by mouth 2 (Two) Times a Day.      carbidopa-levodopa (SINEMET)  MG per tablet Take 1 tablet by mouth 4 (Four) Times a Day. 1.5 tablets      Cholecalciferol 25 MCG (1000 UT) tablet Take 1 tablet by mouth Daily.      citalopram (CeleXA) 10 MG tablet TAKE 1 TABLET BY MOUTH DAILY. 90 tablet 2    Denosumab (PROLIA SC) Inject  under the skin into the appropriate area as directed Every 6 (Six) Months.      doxycycline (VIBRAMYCIN) 100 MG capsule Take 1 capsule by mouth Daily.      multivitamin with minerals tablet tablet Take 1 tablet by mouth Daily.       No facility-administered medications prior to visit.       No opioid medication identified on active medication list. I have reviewed chart for other potential  high risk medication/s  "and harmful drug interactions in the elderly.        Aspirin is not on active medication list.  Aspirin use is not indicated based on review of current medical condition/s. Risk of harm outweighs potential benefits.  .    Patient Active Problem List   Diagnosis    Osteoarthritis    Osteopenia    Screen for colon cancer    Depressive disorder    Parkinson disease    Age-related osteoporosis without current pathological fracture    Closed nondisplaced fracture of second metatarsal bone of left foot    Metatarsal stress fracture of right foot    Arch pain of left foot    Chronic pain in left foot     Advance Care Planning  Advance Directive is on file.  ACP discussion was held with the patient during this visit. Patient has an advance directive in EMR which is still valid.           Objective    Vitals:    08/01/23 1415   BP: 100/70   Pulse: 63   SpO2: 97%   Weight: 51.7 kg (114 lb)   Height: 160 cm (63\")   PainSc:   6   PainLoc: Ankle  Comment: left     Estimated body mass index is 20.19 kg/mý as calculated from the following:    Height as of this encounter: 160 cm (63\").    Weight as of this encounter: 51.7 kg (114 lb).    BMI is within normal parameters. No other follow-up for BMI required.      Does the patient have evidence of cognitive impairment? No    Physical Exam  Vitals and nursing note reviewed.   Constitutional:       General: She is not in acute distress.     Appearance: She is well-developed.   HENT:      Head: Normocephalic and atraumatic.      Nose: Nose normal.   Eyes:      General:         Right eye: No discharge.         Left eye: No discharge.      Conjunctiva/sclera: Conjunctivae normal.      Pupils: Pupils are equal, round, and reactive to light.   Neck:      Thyroid: No thyromegaly.      Trachea: No tracheal deviation.   Cardiovascular:      Rate and Rhythm: Normal rate and regular rhythm.      Heart sounds: Normal heart sounds. No murmur heard.  Pulmonary:      Effort: Pulmonary effort is " normal. No respiratory distress.      Breath sounds: Normal breath sounds. No wheezing or rales.   Chest:      Chest wall: No tenderness.   Breasts:     Right: No inverted nipple, mass, nipple discharge, skin change or tenderness.      Left: No inverted nipple, mass, nipple discharge, skin change or tenderness.   Abdominal:      General: Bowel sounds are normal. There is no distension.      Palpations: Abdomen is soft. There is no mass.      Tenderness: There is no abdominal tenderness.      Hernia: No hernia is present.   Musculoskeletal:         General: No deformity. Normal range of motion.   Lymphadenopathy:      Cervical: No cervical adenopathy.   Skin:     General: Skin is warm and dry.   Neurological:      Mental Status: She is alert and oriented to person, place, and time.      Motor: Tremor (right hand) present.      Deep Tendon Reflexes: Reflexes are normal and symmetric.   Psychiatric:         Behavior: Behavior normal.         Thought Content: Thought content normal.         Judgment: Judgment normal.     Lab Results   Component Value Date    TRIG 110 2023    HDL 74 (H) 2023    LDL 84 2023    VLDL 19 2023            HEALTH RISK ASSESSMENT    Smoking Status:  Social History     Tobacco Use   Smoking Status Never   Smokeless Tobacco Never     Alcohol Consumption:  Social History     Substance and Sexual Activity   Alcohol Use No     Fall Risk Screen:    STEADI Fall Risk Assessment was completed, and patient is at HIGH risk for falls. Assessment completed on:2023    Depression Screenin/1/2023     2:00 PM   PHQ-2/PHQ-9 Depression Screening   Little Interest or Pleasure in Doing Things 0-->not at all   Feeling Down, Depressed or Hopeless 0-->not at all   PHQ-9: Brief Depression Severity Measure Score 0       Health Habits and Functional and Cognitive Screenin/1/2023     2:00 PM   Functional & Cognitive Status   Do you have difficulty preparing food and eating? No    Do you have difficulty bathing yourself, getting dressed or grooming yourself? No   Do you have difficulty using the toilet? No   Do you have difficulty moving around from place to place? No   Do you have trouble with steps or getting out of a bed or a chair? No   Current Diet Well Balanced Diet   Dental Exam Up to date   Eye Exam Up to date   Exercise (times per week) 3 times per week   Current Exercises Include Aerobics   Do you need help using the phone?  No   Are you deaf or do you have serious difficulty hearing?  Yes   Do you need help to go to places out of walking distance? Yes   Do you need help shopping? No   Do you need help preparing meals?  No   Do you need help with housework?  Yes   Do you need help with laundry? Yes   Do you need help taking your medications? No   Do you need help managing money? No   Do you ever drive or ride in a car without wearing a seat belt? No   Have you felt unusual stress, anger or loneliness in the last month? No   Who do you live with? Spouse   If you need help, do you have trouble finding someone available to you? No   Have you been bothered in the last four weeks by sexual problems? No   Do you have difficulty concentrating, remembering or making decisions? Yes       Age-appropriate Screening Schedule:  Refer to the list below for future screening recommendations based on patient's age, sex and/or medical conditions. Orders for these recommended tests are listed in the plan section. The patient has been provided with a written plan.    Health Maintenance   Topic Date Due    TDAP/TD VACCINES (2 - Td or Tdap) 04/02/2019    COVID-19 Vaccine (5 - Moderna series) 09/07/2022    INFLUENZA VACCINE  10/01/2023    COLORECTAL CANCER SCREENING  11/16/2023    DXA SCAN  07/20/2024    LIPID PANEL  07/26/2024    ANNUAL WELLNESS VISIT  08/01/2024    HEPATITIS C SCREENING  Completed    Pneumococcal Vaccine 65+  Completed    ZOSTER VACCINE  Addressed              Assessment & Plan   CMS  Preventative Services Quick Reference  Risk Factors Identified During Encounter  Fall Risk-High or Moderate: Discussed Fall Prevention in the home  Immunizations Discussed/Encouraged: Tdap and COVID19  The above risks/problems have been discussed with the patient.  Follow up actions/plans if indicated are seen below in the Assessment/Plan Section.  Pertinent information has been shared with the patient in the After Visit Summary.    Diagnoses and all orders for this visit:    1. Medicare annual wellness visit, subsequent (Primary)  -     CBC & Differential; Future  -     Comprehensive Metabolic Panel; Future  -     Lipid Panel; Future  -     Urinalysis With Culture If Indicated - Urine, Clean Catch; Future    2. Screening for colon cancer  -     Ambulatory Referral to General Surgery    3. FH: colon cancer  -     Ambulatory Referral to General Surgery        Follow Up:   Return in about 1 year (around 8/1/2024) for medicare wellness visit, Annual physical. Will notify regarding results.     An After Visit Summary and PPPS were made available to the patient.  Information has been scanned into chart. Discussed importance of taking medications as prescribed. Encouraged healthy eating habits with low fat, low salt choices and working towards maintaining a healthy weight. Recommended regular exercise if able as well as care to prevent falls including avoiding anything on the floor that they could slip or trip on such as throw rugs, making sure they have a bathmat to step onto when their feet are wet and having grab bars and railings where needed.

## 2023-08-01 NOTE — PATIENT INSTRUCTIONS
Check on tetanus vaccine at pharmacy or with insurance.     Medicare Wellness  Personal Prevention Plan of Service     Date of Office Visit:    Encounter Provider:  Tiffanie Hidalgo MD  Place of Service:  [facility]  Patient Name: Kalli Lowe  :  1950    As part of the Medicare Wellness portion of your visit today, we are providing you with this personalized preventive plan of services (PPPS). This plan is based upon recommendations of the [place] Preventive Services Task Force (USPSTF) and the Advisory Committee on Immunization Practices (ACIP).    This lists the preventive care services that should be considered, and provides dates of when you are due. Items listed as completed are up-to-date and do not require any further intervention.    Health Maintenance   Topic Date Due    TDAP/TD VACCINES (2 - Td or Tdap) 2019    COVID-19 Vaccine (5 - Moderna series) 2022    ANNUAL WELLNESS VISIT  2023    INFLUENZA VACCINE  10/01/2023    COLORECTAL CANCER SCREENING  2023    DXA SCAN  2024    LIPID PANEL  2024    HEPATITIS C SCREENING  Completed    Pneumococcal Vaccine 65+  Completed    ZOSTER VACCINE  Addressed       No orders of the defined types were placed in this encounter.      No follow-ups on file.

## 2023-08-08 ENCOUNTER — PREP FOR SURGERY (OUTPATIENT)
Dept: OTHER | Facility: HOSPITAL | Age: 73
End: 2023-08-08
Payer: MEDICARE

## 2023-08-08 DIAGNOSIS — Z12.11 SCREEN FOR COLON CANCER: Primary | ICD-10-CM

## 2023-08-08 RX ORDER — SODIUM CHLORIDE 9 MG/ML
40 INJECTION, SOLUTION INTRAVENOUS AS NEEDED
OUTPATIENT
Start: 2023-08-08

## 2023-08-08 RX ORDER — DEXTROSE AND SODIUM CHLORIDE 5; .45 G/100ML; G/100ML
100 INJECTION, SOLUTION INTRAVENOUS CONTINUOUS PRN
OUTPATIENT
Start: 2023-08-08

## 2023-08-14 ENCOUNTER — CLINICAL SUPPORT (OUTPATIENT)
Dept: AUDIOLOGY | Facility: CLINIC | Age: 73
End: 2023-08-14
Payer: MEDICARE

## 2023-08-14 DIAGNOSIS — Z71.89 ENCOUNTER FOR HEARING AID CONSULTATION: Primary | ICD-10-CM

## 2023-08-14 DIAGNOSIS — H90.3 SENSORINEURAL HEARING LOSS (SNHL) OF BOTH EARS: ICD-10-CM

## 2023-08-15 ENCOUNTER — OFFICE VISIT (OUTPATIENT)
Dept: PODIATRY | Facility: CLINIC | Age: 73
End: 2023-08-15
Payer: MEDICARE

## 2023-08-15 VITALS
HEART RATE: 76 BPM | SYSTOLIC BLOOD PRESSURE: 132 MMHG | BODY MASS INDEX: 20.2 KG/M2 | DIASTOLIC BLOOD PRESSURE: 84 MMHG | HEIGHT: 63 IN | WEIGHT: 114 LBS | OXYGEN SATURATION: 98 %

## 2023-08-15 DIAGNOSIS — S92.325K CLOSED NONDISPLACED FRACTURE OF SECOND METATARSAL BONE OF LEFT FOOT WITH NONUNION, SUBSEQUENT ENCOUNTER: Primary | ICD-10-CM

## 2023-08-15 NOTE — PROGRESS NOTES
HEARING AID CONSULT    Name:  Kalli Lowe  :  1950  Age:  73 y.o.  Date of Evaluation:  8/15/2023      HISTORY    Reason for visit:  Kalli Lowe is seen today for a hearing aid consultation at the request of  herself .  A previous audiogram completed on 06/15/2023 shows mild to moderately severe sloping sensorineural hearing loss  for right ear, and mild to severe sloping sensorineural hearing loss  in left ear.  Patient reports she has trouble understanding when people are talking.  She states she has Parkinson's Disease which makes it hard for her to handle small objects.    Hearing aid history:  Patient does not have hearing aids at this time. and Patient is interested in hearing aids at this time.      RECOMMENDATIONS:  Amplification options were discussed.  During the Hearing Aid discussion, Ms. Lowe has chosen 2 Behind the Ear (BTE) with slim fit tubing hearing aid(s) for both ears.  The hearing aid recommended is from [employer] with the Afoundria L90 rechargeable digital circuit, one in Yas Pink and one in Fabián Pirate.    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  Therefore, her cost for these hearing aid is $7,500.00 which will be due at the time of fitting.    Some time after this appointment, her  called saying not to order these hearing aids due to price.  Therefore, these hearing aids will not be ordered at this time.            It was a pleasure seeing Kalli Lowe in Audiology today.  I look forward to helping Ms. Lowe with her amplification needs.            This document has been electronically signed by July Madrid MS CCC-ZAYRA on August 15, 2023 11:02 CDT       July Madrid MS CCC-ZAYRA  Licensed Audiologist    For Billing and Coding:  Z71.89  Encounter  for Hearing Aid Consultation - no charge

## 2023-08-15 NOTE — PROGRESS NOTES
Kalli Lowe  1950  73 y.o. female    08/15/2023    Chief Complaint   Patient presents with    Left Foot - Follow-up, Pain       History of Present Illness    Kalli Lowe is a 73 y.o.female came back to clinic for a follow up appointment on left foot fracture     Patient is here for follow-up evaluation of her left second metatarsal fracture.  She has had a bone stimulator approximately 1 week.    Past Medical History:   Diagnosis Date    Age-related osteoporosis without current pathological fracture 8/18/2021    Benign paroxysmal positional vertigo     Breast lump     left breast benign on u/s guided mammotone bx       Contact dermatitis     olecranon area from chair material?      Cough     Encounter for long-term (current) use of medications     GERD (gastroesophageal reflux disease)     H/O screening mammography     Hip pain     History of Holter monitoring 11/19/2013    Overall rhythm sinus rhythm with average heart rate of 73 bpm, min 49 , max 111. Frequent isolated with frequent bigeminal cycles, total of 491 bigeminal cycles. No couplets of runs of ventricular tachycardia. Rare isolated PACs, total of 25.    History of urinary disorder     Large ovary     Myalgia     Osteoarthritis     Osteopenia     Otalgia of left ear     resolved spontaneously       Palpitations     Parkinsonism     Postmenopausal state     Screening for malignant neoplasm of breast      left breast benign mammotone bx       Screening for osteoporosis     Vertigo     resolved         Past Surgical History:   Procedure Laterality Date    BREAST BIOPSY  09/08/2014    Ultrasound guided left breast mammotome biopsy with 8 gauge needle and also left a clip.    COLONOSCOPY  11/12/2013    Hemorrhoids found.    COLONOSCOPY N/A 11/16/2018    Procedure: COLONOSCOPY;  Surgeon: Claudio Martell MD;  Location: [facility] ENDOSCOPY;  Service: General    HIP SURGERY Right 01/24/2017    Total hip-lateral approach     "INJECTION OF MEDICATION  08/31/2015    PNEUMOC VAC/ADMIN/RCVD 4040F (1)       JOINT REPLACEMENT Right     HIP    PAP SMEAR  08/31/2015    WNL, CARD SENT, DR. CARLSON'S OFFICE         Family History   Problem Relation Age of Onset    Cancer Other         malignant neoplasm of gastrointestinal tract       Colon cancer Other     Other Other           Hematologic disorder    Arthritis Father     Cancer Mother     Colon cancer Mother        Allergies   Allergen Reactions    Contrast Dye (Echo Or Unknown Ct/Mr) Nausea And Vomiting       Social History     Socioeconomic History    Marital status:    Tobacco Use    Smoking status: Never    Smokeless tobacco: Never   Vaping Use    Vaping Use: Never used   Substance and Sexual Activity    Alcohol use: No    Drug use: No    Sexual activity: Defer         Current Outpatient Medications   Medication Sig Dispense Refill    Calcium Carbonate-Vitamin D (CALTRATE 600+D PO) Take 1 tablet by mouth 2 (Two) Times a Day.      carbidopa-levodopa (SINEMET)  MG per tablet Take 1 tablet by mouth 4 (Four) Times a Day. 1.5 tablets      cholecalciferol (VITAMIN D3) 25 MCG (1000 UT) tablet Take 1 tablet by mouth Daily.      citalopram (CeleXA) 10 MG tablet TAKE 1 TABLET BY MOUTH DAILY. 90 tablet 2    Denosumab (PROLIA SC) Inject  under the skin into the appropriate area as directed Every 6 (Six) Months.      doxycycline (VIBRAMYCIN) 100 MG capsule Take 1 capsule by mouth Daily.      multivitamin with minerals tablet tablet Take 1 tablet by mouth Daily.       No current facility-administered medications for this visit.       Review of Systems   Musculoskeletal:         Foot pain    All other systems reviewed and are negative.      OBJECTIVE    /84   Pulse 76   Ht 160 cm (63\")   Wt 51.7 kg (114 lb)   LMP  (LMP Unknown)   SpO2 98%   BMI 20.19 kg/mý     Physical Exam  Vitals reviewed.   Constitutional:       Appearance: Normal appearance. She is well-developed.   HENT:      " Head: Normocephalic and atraumatic.   Neck:      Trachea: Trachea and phonation normal.   Cardiovascular:      Pulses:           Dorsalis pedis pulses are 1+ on the right side and 1+ on the left side.        Posterior tibial pulses are 1+ on the right side and 1+ on the left side.   Pulmonary:      Effort: Pulmonary effort is normal. No respiratory distress.   Abdominal:      General: There is no distension.      Palpations: Abdomen is soft.   Musculoskeletal:        Feet:    Feet:      Right foot:      Skin integrity: Skin integrity normal.      Toenail Condition: Right toenails are normal.      Left foot:      Skin integrity: Skin integrity normal.      Toenail Condition: Left toenails are normal.      Comments: Palpable tenderness in and around the base of the second metatarsal  Skin:     General: Skin is warm and dry.   Neurological:      Mental Status: She is alert and oriented to person, place, and time.      GCS: GCS eye subscore is 4. GCS verbal subscore is 5. GCS motor subscore is 6.   Psychiatric:         Speech: Speech normal.         Behavior: Behavior normal. Behavior is cooperative.         Thought Content: Thought content normal.         Judgment: Judgment normal.            Procedures        ASSESSMENT AND PLAN    Diagnoses and all orders for this visit:    1. 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  There may be some evidence of bony healing when compared to previous films obtained in June 2023.  Will recommend continued use of the bone stimulator and follow-up in 1 month for repeat x-rays of the foot at that point.  Patient still not interested in surgical modalities at this point.  Contact office for worsening symptoms        This document has been electronically signed by Raza RIVERA, FNP-C, ONP-C on August 15, 2023 11:10 CDT

## 2023-09-11 ENCOUNTER — TELEPHONE (OUTPATIENT)
Dept: FAMILY MEDICINE CLINIC | Facility: CLINIC | Age: 73
End: 2023-09-11
Payer: MEDICARE

## 2023-09-11 DIAGNOSIS — M81.0 AGE-RELATED OSTEOPOROSIS WITHOUT CURRENT PATHOLOGICAL FRACTURE: Primary | ICD-10-CM

## 2023-09-11 NOTE — TELEPHONE ENCOUNTER
Kalli is supposed to do her Prolia inj on 9-13 and said, she usually does labs prior to getting the Prolia inj.  She wanted to know if she needs to this time?

## 2023-09-12 ENCOUNTER — LAB (OUTPATIENT)
Dept: LAB | Facility: HOSPITAL | Age: 73
End: 2023-09-12
Payer: MEDICARE

## 2023-09-12 DIAGNOSIS — M81.0 AGE-RELATED OSTEOPOROSIS WITHOUT CURRENT PATHOLOGICAL FRACTURE: ICD-10-CM

## 2023-09-12 LAB
CALCIUM SPEC-SCNC: 9.9 MG/DL (ref 8.6–10.5)
MAGNESIUM SERPL-MCNC: 3.5 MG/DL (ref 1.6–2.4)
PHOSPHATE SERPL-MCNC: 3.9 MG/DL (ref 2.5–4.5)

## 2023-09-12 PROCEDURE — 82310 ASSAY OF CALCIUM: CPT

## 2023-09-12 PROCEDURE — 36415 COLL VENOUS BLD VENIPUNCTURE: CPT

## 2023-09-12 PROCEDURE — 83735 ASSAY OF MAGNESIUM: CPT

## 2023-09-12 PROCEDURE — 84100 ASSAY OF PHOSPHORUS: CPT

## 2023-09-13 ENCOUNTER — INFUSION (OUTPATIENT)
Dept: ONCOLOGY | Facility: HOSPITAL | Age: 73
End: 2023-09-13
Payer: MEDICARE

## 2023-09-13 VITALS
DIASTOLIC BLOOD PRESSURE: 58 MMHG | SYSTOLIC BLOOD PRESSURE: 122 MMHG | TEMPERATURE: 97.7 F | RESPIRATION RATE: 18 BRPM | HEART RATE: 101 BPM

## 2023-09-13 DIAGNOSIS — M85.80 OSTEOPENIA, UNSPECIFIED LOCATION: ICD-10-CM

## 2023-09-13 DIAGNOSIS — M19.90 OSTEOARTHRITIS, UNSPECIFIED OSTEOARTHRITIS TYPE, UNSPECIFIED SITE: Primary | ICD-10-CM

## 2023-09-13 PROCEDURE — 25010000002 DENOSUMAB 60 MG/ML SOLUTION PREFILLED SYRINGE: Performed by: GENERAL PRACTICE

## 2023-09-13 PROCEDURE — 96372 THER/PROPH/DIAG INJ SC/IM: CPT | Performed by: NURSE PRACTITIONER

## 2023-09-13 RX ADMIN — DENOSUMAB 60 MG: 60 INJECTION SUBCUTANEOUS at 14:41

## 2023-09-19 ENCOUNTER — OFFICE VISIT (OUTPATIENT)
Dept: PODIATRY | Facility: CLINIC | Age: 73
End: 2023-09-19
Payer: MEDICARE

## 2023-09-19 VITALS
DIASTOLIC BLOOD PRESSURE: 72 MMHG | HEIGHT: 63 IN | HEART RATE: 101 BPM | OXYGEN SATURATION: 99 % | WEIGHT: 114 LBS | SYSTOLIC BLOOD PRESSURE: 120 MMHG | BODY MASS INDEX: 20.2 KG/M2

## 2023-09-19 DIAGNOSIS — S92.325G CLOSED NONDISPLACED FRACTURE OF SECOND METATARSAL BONE OF LEFT FOOT WITH DELAYED HEALING, SUBSEQUENT ENCOUNTER: Primary | ICD-10-CM

## 2023-09-19 PROCEDURE — 99213 OFFICE O/P EST LOW 20 MIN: CPT | Performed by: NURSE PRACTITIONER

## 2023-09-19 NOTE — PROGRESS NOTES
Kalli Lowe  1950  73 y.o. female  09/19/2023    Chief Complaint   Patient presents with    Left Foot - Follow-up     Fx         History of Present Illness    Kalli Lowe is a 73 y.o.female came back to clinic for a follow up appointment on left foot fracture       Past Medical History:   Diagnosis Date    Age-related osteoporosis without current pathological fracture 8/18/2021    Benign paroxysmal positional vertigo     Breast lump     left breast benign on u/s guided mammotone bx       Contact dermatitis     olecranon area from chair material?      Cough     Encounter for long-term (current) use of medications     GERD (gastroesophageal reflux disease)     H/O screening mammography     Hip pain     History of Holter monitoring 11/19/2013    Overall rhythm sinus rhythm with average heart rate of 73 bpm, min 49 , max 111. Frequent isolated with frequent bigeminal cycles, total of 491 bigeminal cycles. No couplets of runs of ventricular tachycardia. Rare isolated PACs, total of 25.    History of urinary disorder     Large ovary     Myalgia     Osteoarthritis     Osteopenia     Otalgia of left ear     resolved spontaneously       Palpitations     Parkinsonism     Postmenopausal state     Screening for malignant neoplasm of breast      left breast benign mammotone bx       Screening for osteoporosis     Vertigo     resolved         Past Surgical History:   Procedure Laterality Date    BREAST BIOPSY  09/08/2014    Ultrasound guided left breast mammotome biopsy with 8 gauge needle and also left a clip.    COLONOSCOPY  11/12/2013    Hemorrhoids found.    COLONOSCOPY N/A 11/16/2018    Procedure: COLONOSCOPY;  Surgeon: Claudio Martell MD;  Location: [facility] ENDOSCOPY;  Service: General    HIP SURGERY Right 01/24/2017    Total hip-lateral approach    INJECTION OF MEDICATION  08/31/2015    PNEUMOC VAC/ADMIN/RCVD 4040F (1)       JOINT REPLACEMENT Right     HIP    PAP SMEAR  08/31/2015     "WNL, CARD SENT, DR. CARLSON'S OFFICE         Family History   Problem Relation Age of Onset    Cancer Other         malignant neoplasm of gastrointestinal tract       Colon cancer Other     Other Other           Hematologic disorder    Arthritis Father     Cancer Mother     Colon cancer Mother        Allergies   Allergen Reactions    Contrast Dye (Echo Or Unknown Ct/Mr) Nausea And Vomiting       Social History     Socioeconomic History    Marital status:    Tobacco Use    Smoking status: Never    Smokeless tobacco: Never   Vaping Use    Vaping Use: Never used   Substance and Sexual Activity    Alcohol use: No    Drug use: No    Sexual activity: Defer         Current Outpatient Medications   Medication Sig Dispense Refill    Calcium Carbonate-Vitamin D (CALTRATE 600+D PO) Take 1 tablet by mouth 2 (Two) Times a Day.      carbidopa-levodopa (SINEMET)  MG per tablet Take 1 tablet by mouth 4 (Four) Times a Day. 1.5 tablets      cholecalciferol (VITAMIN D3) 25 MCG (1000 UT) tablet Take 1 tablet by mouth Daily.      citalopram (CeleXA) 10 MG tablet TAKE 1 TABLET BY MOUTH DAILY. 90 tablet 2    Denosumab (PROLIA SC) Inject  under the skin into the appropriate area as directed Every 6 (Six) Months.      doxycycline (VIBRAMYCIN) 100 MG capsule Take 1 capsule by mouth Daily.      multivitamin with minerals tablet tablet Take 1 tablet by mouth Daily.       No current facility-administered medications for this visit.       Review of Systems   Musculoskeletal:         Foot pain    All other systems reviewed and are negative.      OBJECTIVE    /72   Pulse 101   Ht 160 cm (63\")   Wt 51.7 kg (114 lb)   LMP  (LMP Unknown)   SpO2 99%   BMI 20.19 kg/m²     Physical Exam  Vitals reviewed.   Constitutional:       Appearance: Normal appearance. She is well-developed.   HENT:      Head: Normocephalic and atraumatic.   Neck:      Trachea: Trachea and phonation normal.   Cardiovascular:      Pulses:           Dorsalis " pedis pulses are 1+ on the right side and 1+ on the left side.        Posterior tibial pulses are 1+ on the right side and 1+ on the left side.   Pulmonary:      Effort: Pulmonary effort is normal. No respiratory distress.   Abdominal:      General: There is no distension.      Palpations: Abdomen is soft.   Musculoskeletal:        Feet:    Feet:      Right foot:      Skin integrity: Skin integrity normal.      Toenail Condition: Right toenails are normal.      Left foot:      Skin integrity: Skin integrity normal.      Toenail Condition: Left toenails are normal.      Comments: Palpable tenderness in and around the base of the second metatarsal  Skin:     General: Skin is warm and dry.   Neurological:      Mental Status: She is alert and oriented to person, place, and time.      GCS: GCS eye subscore is 4. GCS verbal subscore is 5. GCS motor subscore is 6.   Psychiatric:         Speech: Speech normal.         Behavior: Behavior normal. Behavior is cooperative.         Thought Content: Thought content normal.         Judgment: Judgment normal.            Procedures        ASSESSMENT AND PLAN    Diagnoses and all orders for this visit:    1. Closed nondisplaced fracture of second metatarsal bone of left foot with delayed healing, subsequent encounter (Primary)      Improving.  Will recommend continued use of the bone stimulator and follow-up and in 6 weeks for recheck with repeat x-rays of the foot.  Contact office for worsening symptoms            This document has been electronically signed by Raza RIVERA FNPNADIA, ONP-C on September 19, 2023 13:49 CDT

## (undated) DEVICE — CANN SMPL SOFTECH BIFLO ETCO2 A/M 7FT